# Patient Record
Sex: FEMALE | Race: WHITE | NOT HISPANIC OR LATINO | Employment: OTHER | URBAN - METROPOLITAN AREA
[De-identification: names, ages, dates, MRNs, and addresses within clinical notes are randomized per-mention and may not be internally consistent; named-entity substitution may affect disease eponyms.]

---

## 2017-08-02 LAB
ALBUMIN ?TM MFR UR ELPH: 100 %
ALBUMIN SERPL ELPH-MCNC: 3.8 G/DL (ref 3.8–4.8)
ALBUMIN SERPL-MCNC: 3.9 G/DL (ref 3.6–5.1)
ALBUMIN/GLOB SERPL: 1.3 (CALC) (ref 1–2.5)
ALP SERPL-CCNC: 68 U/L (ref 33–130)
ALPHA1 GLOB ?TM MFR UR ELPH: NORMAL %
ALPHA1 GLOB SERPL ELPH-MCNC: 0.3 G/DL (ref 0.2–0.3)
ALPHA2 GLOB ?TM MFR UR ELPH: NORMAL %
ALPHA2 GLOB SERPL ELPH-MCNC: 0.8 G/DL (ref 0.5–0.9)
ALT SERPL-CCNC: 11 U/L (ref 6–29)
AST SERPL-CCNC: 22 U/L (ref 10–35)
B-GLOBULIN ?TM MFR UR ELPH: NORMAL %
B2 MICROGLOB SERPL-MCNC: 3.75 MG/L
BASOPHILS # BLD AUTO: 59 CELLS/UL (ref 0–200)
BASOPHILS NFR BLD AUTO: 1.1 %
BETA1 GLOB SERPL ELPH-MCNC: 0.4 G/DL (ref 0.4–0.6)
BETA2 GLOB SERPL ELPH-MCNC: 0.3 G/DL (ref 0.2–0.5)
BILIRUB SERPL-MCNC: 0.7 MG/DL (ref 0.2–1.2)
BUN SERPL-MCNC: 25 MG/DL (ref 7–25)
BUN/CREAT SERPL: 22 (CALC) (ref 6–22)
CALCIUM SERPL-MCNC: 10.2 MG/DL (ref 8.6–10.4)
CHLORIDE SERPL-SCNC: 108 MMOL/L (ref 98–110)
CO2 SERPL-SCNC: 25 MMOL/L (ref 20–31)
CREAT 24H UR-MRATE: 0.45 G/24 H (ref 0.63–2.5)
CREAT SERPL-MCNC: 1.13 MG/DL (ref 0.6–0.88)
EOSINOPHIL # BLD AUTO: 400 CELLS/UL (ref 15–500)
EOSINOPHIL NFR BLD AUTO: 7.4 %
ERYTHROCYTE [DISTWIDTH] IN BLOOD BY AUTOMATED COUNT: 13.9 % (ref 11–15)
GAMMA GLOB ?TM MFR UR ELPH: NORMAL %
GAMMA GLOB SERPL ELPH-MCNC: 1.2 G/DL (ref 0.8–1.7)
GFR SERPL CREATININE-BSD FRML MDRD: 43 ML/MIN/1.73M2
GLOBULIN SER CALC-MCNC: 2.9 G/DL (CALC) (ref 1.9–3.7)
GLUCOSE SERPL-MCNC: 98 MG/DL (ref 65–99)
HCT VFR BLD AUTO: 40.2 % (ref 35–45)
HGB BLD-MCNC: 13 G/DL (ref 11.7–15.5)
IGA SERPL-MCNC: 142 MG/DL (ref 81–463)
IGG SERPL-MCNC: 1320 MG/DL (ref 694–1618)
IGM SERPL-MCNC: 62 MG/DL (ref 48–271)
INTERPRETATION: NORMAL
KAPPA LC FREE SER-MCNC: 28.4 MG/L (ref 3.3–19.4)
KAPPA LC FREE/LAMBDA FREE SER: 1.49 {RATIO} (ref 0.26–1.65)
LAMBDA LC FREE SERPL-MCNC: 19 MG/L (ref 5.7–26.3)
LYMPHOCYTES # BLD AUTO: 1145 CELLS/UL (ref 850–3900)
LYMPHOCYTES NFR BLD AUTO: 21.2 %
M PROTEIN 1 SERPL ELPH-MCNC: 0.8 G/DL
MCH RBC QN AUTO: 30.1 PG (ref 27–33)
MCHC RBC AUTO-ENTMCNC: 32.3 G/DL (ref 32–36)
MCV RBC AUTO: 93.1 FL (ref 80–100)
MONOCYTES # BLD AUTO: 491 CELLS/UL (ref 200–950)
MONOCYTES NFR BLD AUTO: 9.1 %
NEUTROPHILS # BLD AUTO: 3305 CELLS/UL (ref 1500–7800)
NEUTROPHILS NFR BLD AUTO: 61.2 %
PLATELET # BLD AUTO: 215 THOUSAND/UL (ref 140–400)
PMV BLD REES-ECKER: 11.7 FL (ref 7.5–12.5)
POTASSIUM SERPL-SCNC: 4.4 MMOL/L (ref 3.5–5.3)
PROT 24H UR-MRATE: 44 MG/24 H
PROT PATTERN SERPL ELPH-IMP: ABNORMAL
PROT SERPL-MCNC: 6.8 G/DL (ref 6.1–8.1)
PROT SERPL-MCNC: 6.9 G/DL (ref 6.1–8.1)
PROT/CREAT 24H UR: 97 MG/G CREAT
RBC # BLD AUTO: 4.32 MILLION/UL (ref 3.8–5.1)
SODIUM SERPL-SCNC: 144 MMOL/L (ref 135–146)
WBC # BLD AUTO: 5.4 THOUSAND/UL (ref 3.8–10.8)

## 2017-08-10 ENCOUNTER — OFFICE VISIT (OUTPATIENT)
Dept: HEMATOLOGY/ONCOLOGY | Facility: CLINIC | Age: 82
End: 2017-08-10
Payer: MEDICARE

## 2017-08-10 VITALS
TEMPERATURE: 98 F | SYSTOLIC BLOOD PRESSURE: 159 MMHG | DIASTOLIC BLOOD PRESSURE: 79 MMHG | HEART RATE: 71 BPM | WEIGHT: 132 LBS | RESPIRATION RATE: 18 BRPM

## 2017-08-10 DIAGNOSIS — D47.2 MONOCLONAL PARAPROTEINEMIA: ICD-10-CM

## 2017-08-10 DIAGNOSIS — N18.9 ANEMIA IN CHRONIC KIDNEY DISEASE(285.21): ICD-10-CM

## 2017-08-10 DIAGNOSIS — D63.8 ANEMIA OF OTHER CHRONIC DISEASE: ICD-10-CM

## 2017-08-10 DIAGNOSIS — D63.1 ANEMIA IN CHRONIC KIDNEY DISEASE(285.21): ICD-10-CM

## 2017-08-10 PROCEDURE — 1126F AMNT PAIN NOTED NONE PRSNT: CPT | Mod: ,,, | Performed by: INTERNAL MEDICINE

## 2017-08-10 PROCEDURE — 1159F MED LIST DOCD IN RCRD: CPT | Mod: ,,, | Performed by: INTERNAL MEDICINE

## 2017-08-10 PROCEDURE — 99213 OFFICE O/P EST LOW 20 MIN: CPT | Mod: ,,, | Performed by: INTERNAL MEDICINE

## 2017-08-10 RX ORDER — ASPIRIN 81 MG/1
81 TABLET ORAL DAILY
COMMUNITY
End: 2018-01-23

## 2017-08-10 RX ORDER — TIMOLOL MALEATE 5 MG/ML
1 SOLUTION OPHTHALMIC DAILY
Status: ON HOLD | COMMUNITY
Start: 2017-06-07 | End: 2019-08-22 | Stop reason: HOSPADM

## 2017-08-10 RX ORDER — CALCITRIOL 0.25 UG/1
CAPSULE ORAL
Status: ON HOLD | COMMUNITY
Start: 2017-06-05 | End: 2021-01-01 | Stop reason: HOSPADM

## 2017-08-10 RX ORDER — FUROSEMIDE 20 MG/1
20 TABLET ORAL DAILY
COMMUNITY
Start: 2017-06-07 | End: 2020-01-01

## 2017-08-10 RX ORDER — DILTIAZEM HYDROCHLORIDE 240 MG/1
CAPSULE, EXTENDED RELEASE ORAL
COMMUNITY
Start: 2017-06-09 | End: 2018-08-02 | Stop reason: ALTCHOICE

## 2017-08-10 RX ORDER — TROSPIUM CHLORIDE 20 MG/1
20 TABLET, FILM COATED ORAL NIGHTLY
COMMUNITY
Start: 2017-06-11 | End: 2019-09-16 | Stop reason: SDUPTHER

## 2017-08-10 RX ORDER — LOVASTATIN 20 MG/1
20 TABLET ORAL NIGHTLY
COMMUNITY
Start: 2017-06-11 | End: 2019-09-16 | Stop reason: SDUPTHER

## 2017-08-10 NOTE — PROGRESS NOTES
[facility] Hematology/Oncology  PROGRESS NOTE      Subjective:       Patient ID:   NAME: Maru Mansfield : 1927     89 y.o. female    Referring Doc: Curtis  Other Physicians: Eliseo quinones    Chief Complaint:  MGUS f/u    History of Present Illness:     Patient returns today for a regularly scheduled follow-up visit.  The patient is here with her . She completed the 3 month regimen of oral chemotherapy for the basal cell carcinoma per Dr Rodrigez's direction. She has been having some memory issues according to her son and they have a follow-up with Dr magana; no CP, SOB, HA's or N/V            ROS:   GEN: normal without any fever, night sweats or weight loss  HEENT: normal with no HA's, sore throat, stiff neck, changes in vision  CV: normal with no CP, SOB, PND, CREWS or orthopnea  PULM: normal with no SOB, cough, hemoptysis, sputum or pleuritic pain  GI: normal with no abdominal pain, nausea, vomiting, constipation, diarrhea, melanotic stools, BRBPR, or hematemesis  : normal with no hematuria, dysuria  BREAST: normal with no mass, discharge, pain  SKIN: normal with no rash, erythema, bruising, or swelling    Allergies:  Review of patient's allergies indicates:  Allergies not on file    Medications:    Current Outpatient Prescriptions:     aspirin (ECOTRIN) 81 MG EC tablet, Take 81 mg by mouth once daily., Disp: , Rfl:     calcitRIOL (ROCALTROL) 0.25 MCG Cap, , Disp: , Rfl:     diltiaZEM (DILACOR XR) 240 MG CDCR, , Disp: , Rfl:     furosemide (LASIX) 20 MG tablet, , Disp: , Rfl:     lovastatin (MEVACOR) 20 MG tablet, , Disp: , Rfl:     timolol maleate 0.5% (TIMOPTIC-XE) 0.5 % SolG, , Disp: , Rfl:     trospium (SANCTURA) 20 mg Tab tablet, , Disp: , Rfl:     PMHx/PSHx Updates:  See patient's last visit with me on 17.  See H&P on 3/17/2010        Pathology:  No matching staging information was found for the patient.          Objective:     Vitals:  Blood pressure (!) 159/79, pulse 71, temperature  97.8 °F (36.6 °C), temperature source Oral, resp. rate 18, weight 59.9 kg (132 lb).    Physical Examination:   GEN: no apparent distress, comfortable; AAOx3  HEAD: atraumatic and normocephalic  EYES: no pallor, no icterus, PERRLA  ENT: OMM, no pharyngeal erythema, external ears WNL; no nasal discharge; no thrush  NECK: no masses, thyroid normal, trachea midline, no LAD/LN's, supple  CV: RRR with no murmur; normal pulse; normal S1 and S2; no pedal edema  CHEST: Normal respiratory effort; CTAB; normal breath sounds; no wheeze or crackles  ABDOM: nontender and nondistended; soft; normal bowel sounds; no rebound/guarding  MUSC/Skeletal: ROM normal; no crepitus;chronic arthritic changes  EXTREM: no clubbing, cyanosis, inflammation or swelling  SKIN: no rashes, lesions, ulcers, petechiae or subcutaneous nodules  : no bryan  NEURO: grossly intact; motor/sensory WNL; AAOx3; no tremors  PSYCH: normal mood, affect and behavior  LYMPH: normal cervical, supraclavicular, axillary and groin LN's            Labs:     7/31/2017      Radiology/Diagnostic Studies:    No results found.    I have reviewed all available lab results and radiology reports.    Assessment/Plan:   (1) 89 y.o. female with diagnosis of MGUS  - latest kappa/lanm ratio normal at 1.49  - Ig panel wnl  - beta2 microglob at 3.75  - SPEP with 0.8 band and stable      (2) chronic anemia with multifactorial etiology including anemia of chronic disorders and anemia of chronic renal disease  - latest hgb wnl at 11.7     (3) HTN - followed by Dr Acevedo; BP still a little high    (4) CRI - followed by Dr Barron with neph    (5) basal cell skin ca - followed by Dr Rodrigez with derm; she completed 3 month regimen of oral therapy per Dr Rodrigez's direction            PLAN:  1. Repeat protein labs in 6 months  2. F/u with PCP, Neph and Derm  3. RTC in 6 months  Fax note to Jose Marino, Kannan Acevedo MD, and Elia    I spent over 15 mins of time with the patient. Over half  of this time was spent couseling and coordinating care: reviewing materials, labs, reports and studies; making treatment and analytical decisions; ordering necessary labs, tests and studies; and discussing treatment options and setting up treatment plans.      Discussion:     I have explained all of the above in detail and the patient understands all of the current recommendation(s). I have answered all of their questions to the best of my ability and to their complete satisfaction.   The patient is to continue with the current management plan.            Electronically signed by Tomas Fregoso MD

## 2018-01-23 ENCOUNTER — OFFICE VISIT (OUTPATIENT)
Dept: HEMATOLOGY/ONCOLOGY | Facility: CLINIC | Age: 83
End: 2018-01-23
Payer: MEDICARE

## 2018-01-23 VITALS
WEIGHT: 143.63 LBS | SYSTOLIC BLOOD PRESSURE: 134 MMHG | HEART RATE: 54 BPM | DIASTOLIC BLOOD PRESSURE: 61 MMHG | RESPIRATION RATE: 18 BRPM | TEMPERATURE: 98 F

## 2018-01-23 DIAGNOSIS — D63.8 ANEMIA, CHRONIC DISEASE: ICD-10-CM

## 2018-01-23 DIAGNOSIS — D63.1 ANEMIA IN CHRONIC KIDNEY DISEASE, UNSPECIFIED CKD STAGE: ICD-10-CM

## 2018-01-23 DIAGNOSIS — N18.9 ANEMIA IN CHRONIC KIDNEY DISEASE, UNSPECIFIED CKD STAGE: ICD-10-CM

## 2018-01-23 DIAGNOSIS — D47.2 MONOCLONAL PARAPROTEINEMIA: Primary | ICD-10-CM

## 2018-01-23 PROCEDURE — 99213 OFFICE O/P EST LOW 20 MIN: CPT | Mod: ,,, | Performed by: INTERNAL MEDICINE

## 2018-01-23 RX ORDER — AMLODIPINE BESYLATE 10 MG/1
TABLET ORAL
COMMUNITY
Start: 2017-11-28 | End: 2018-08-02 | Stop reason: ALTCHOICE

## 2018-01-23 RX ORDER — AMLODIPINE BESYLATE 5 MG/1
TABLET ORAL
COMMUNITY
Start: 2018-01-08 | End: 2018-08-02 | Stop reason: ALTCHOICE

## 2018-01-23 RX ORDER — ACETAMINOPHEN 500 MG
TABLET ORAL
COMMUNITY

## 2018-01-23 RX ORDER — MAGNESIUM 30 MG
TABLET ORAL ONCE
Status: ON HOLD | COMMUNITY
End: 2019-08-20

## 2018-01-23 RX ORDER — UBIDECARENONE 75 MG
500 CAPSULE ORAL DAILY
COMMUNITY
End: 2020-01-01

## 2018-01-23 RX ORDER — IBUPROFEN 800 MG/1
TABLET ORAL
COMMUNITY
Start: 2017-11-15 | End: 2020-01-01

## 2018-01-23 NOTE — PROGRESS NOTES
[facility] Hematology/Oncology  PROGRESS NOTE      Subjective:       Patient ID:   NAME: Maru Mansfield : 1927     89 y.o. female    Referring Doc: Curtis  Other Physicians: Eliseo Barron Barrios    Chief Complaint:  MGUS f/u    History of Present Illness:     Patient returns today for a regularly scheduled follow-up visit.  The patient is here with her son. She has been having some memory issues according to her son and they have a follow-up with Dr Acevedo today; no CP, SOB, HA's or N/V; she has some decrease in appetite; occasional chronic cough            ROS:   GEN: normal without any fever, night sweats or weight loss; decrease appetite  HEENT: normal with no HA's, sore throat, stiff neck, changes in vision  CV: normal with no CP, SOB, PND, CREWS or orthopnea  PULM: normal with no SOB, hemoptysis, sputum or pleuritic pain; occ chronic cough  GI: normal with no abdominal pain, nausea, vomiting, constipation, diarrhea, melanotic stools, BRBPR, or hematemesis  : normal with no hematuria, dysuria  BREAST: normal with no mass, discharge, pain  SKIN: normal with no rash, erythema, bruising, or swelling    Allergies:  Review of patient's allergies indicates:  Allergies not on file    Medications:    Current Outpatient Prescriptions:     cyanocobalamin 500 MCG tablet, Take 500 mcg by mouth once daily., Disp: , Rfl:     magnesium 30 mg Tab, Take by mouth once., Disp: , Rfl:     melatonin 5 mg Tab, Take by mouth., Disp: , Rfl:     amLODIPine (NORVASC) 10 MG tablet, , Disp: , Rfl:     amLODIPine (NORVASC) 5 MG tablet, , Disp: , Rfl:     calcitRIOL (ROCALTROL) 0.25 MCG Cap, , Disp: , Rfl:     diltiaZEM (DILACOR XR) 240 MG CDCR, , Disp: , Rfl:     furosemide (LASIX) 20 MG tablet, , Disp: , Rfl:     ibuprofen (ADVIL,MOTRIN) 800 MG tablet, , Disp: , Rfl:     lovastatin (MEVACOR) 20 MG tablet, , Disp: , Rfl:     timolol maleate 0.5% (TIMOPTIC-XE) 0.5 % SolG, , Disp: , Rfl:     trospium (SANCTURA) 20 mg Tab  tablet, , Disp: , Rfl:     PMHx/PSHx Updates:  See patient's last visit with me on 8/10/17.  See H&P on 3/17/2010        Pathology:  No matching staging information was found for the patient.          Objective:     Vitals:  Blood pressure 134/61, pulse (!) 54, temperature 98.4 °F (36.9 °C), resp. rate 18, weight 65.1 kg (143 lb 9.6 oz).    Physical Examination:   GEN: no apparent distress, comfortable; AAOx3  HEAD: atraumatic and normocephalic  EYES: no pallor, no icterus, PERRLA  ENT: OMM, no pharyngeal erythema, external ears WNL; no nasal discharge; no thrush  NECK: no masses, thyroid normal, trachea midline, no LAD/LN's, supple  CV: RRR with no murmur; normal pulse; normal S1 and S2; no pedal edema  CHEST: Normal respiratory effort; CTAB; normal breath sounds; no wheeze or crackles, she uses O2 at night  ABDOM: nontender and nondistended; soft; normal bowel sounds; no rebound/guarding  MUSC/Skeletal: ROM normal; no crepitus;chronic arthritic changes, she uses a walker  EXTREM: no clubbing, cyanosis, inflammation or swelling  SKIN: no rashes, lesions, ulcers, petechiae or subcutaneous nodules  : no bryan  NEURO: grossly intact; motor/sensory WNL; AAOx3; no tremors  PSYCH: normal mood, affect and behavior  LYMPH: normal cervical, supraclavicular, axillary and groin LN's            Labs:     No recent labs      Radiology/Diagnostic Studies:    No results found.    I have reviewed all available lab results and radiology reports.    Assessment/Plan:   (1) 89 y.o. female with diagnosis of MGUS  - she has not had any recent labs or urine studies since July 2017  - will re-order studies      (2) chronic anemia with multifactorial etiology including anemia of chronic disorders and anemia of chronic renal disease  - latest hgb wnl at 11.7 in July 2017     (3) HTN - followed by Dr Acevedo; BP still a little high    (4) CRI - followed by Dr Barron with neph    (5) basal cell skin ca - followed by Dr Rodrigez with derm; she  completed 3 month regimen of oral therapy per Dr Rodrigez's direction    (6) Atrial fibrillation - followed by Dr Javier with cardiology; hospitalized in Nov 2017    (7) Noncompliance with labs studies        PLAN:  1. Reorder protein studies and basic labs - repeat protein studies every 6 months  2. F/u with PCP, Neph and Derm  3. RTC in 6 months  4. Encouraged compliance  Fax note to Jose Marino, Kannan Acevedo MD, and Elia    I spent over 15 mins of time with the patient. Over half of this time was spent couseling and coordinating care: reviewing materials, labs, reports and studies; making treatment and analytical decisions; ordering necessary labs, tests and studies; and discussing treatment options and setting up treatment plans.      Discussion:     I have explained all of the above in detail and the patient understands all of the current recommendation(s). I have answered all of their questions to the best of my ability and to their complete satisfaction.   The patient is to continue with the current management plan.            Electronically signed by Tomas Fregoso MD

## 2018-01-23 NOTE — LETTER
January 23, 2018      Kannan Acevedo MD  [address]  [address]  [place] [address]           [facility] Hematology Oncology  [address]  [address]  [place] [address]  Phone: [phone]  Fax: [phone]          Patient: Maru Mansfield   MR Number: [id number]   YOB: 1927   Date of Visit: 1/23/2018       Dear Dr. Kannan Acevedo:    Thank you for referring Maru Mansfield to me for evaluation. Attached you will find relevant portions of my assessment and plan of care.    If you have questions, please do not hesitate to call me. I look forward to following Maru Mansfield along with you.    Sincerely,    Tomas Fregoso MD    Enclosure  CC:  No Recipients    If you would like to receive this communication electronically, please contact [email or web address] or [phone] to request more information on [employer] Link access.    For providers and/or their staff who would like to refer a patient to [facility], please contact us through our one-stop-shop provider referral line, [facility], at [phone].    If you feel you have received this communication in error or would no longer like to receive these types of communications, please e-mail [email or web address]

## 2018-01-24 ENCOUNTER — TELEPHONE (OUTPATIENT)
Dept: HEMATOLOGY/ONCOLOGY | Facility: CLINIC | Age: 83
End: 2018-01-24

## 2018-01-24 NOTE — TELEPHONE ENCOUNTER
----- Message from Selin Mustafa sent at 1/24/2018  9:09 AM CST -----  Avelino patient's son called in and stated that on patient's AVS it says to stop the aspirin. The patient would like to know why. Please advise and contact Avelino at [phone]. Thanks

## 2018-01-24 NOTE — TELEPHONE ENCOUNTER
Called pt son back to let him know him know mother should keep taking aspirin and not taking both norvasc both pills

## 2018-01-31 LAB
ALBUMIN ?TM MFR UR ELPH: 52 %
ALBUMIN SERPL ELPH-MCNC: 3.8 G/DL (ref 3.8–4.8)
ALBUMIN SERPL-MCNC: 3.7 G/DL (ref 3.6–5.1)
ALBUMIN/GLOB SERPL: 1.4 (CALC) (ref 1–2.5)
ALP SERPL-CCNC: 73 U/L (ref 33–130)
ALPHA1 GLOB ?TM MFR UR ELPH: 6 %
ALPHA1 GLOB SERPL ELPH-MCNC: 0.4 G/DL (ref 0.2–0.3)
ALPHA2 GLOB ?TM MFR UR ELPH: 10 %
ALPHA2 GLOB SERPL ELPH-MCNC: 0.8 G/DL (ref 0.5–0.9)
ALT SERPL-CCNC: 53 U/L (ref 6–29)
AST SERPL-CCNC: 58 U/L (ref 10–35)
B-GLOBULIN ?TM MFR UR ELPH: 17 %
B2 MICROGLOB SERPL-MCNC: 4.46 MG/L
BASOPHILS # BLD AUTO: 78 CELLS/UL (ref 0–200)
BASOPHILS NFR BLD AUTO: 1.3 %
BETA1 GLOB SERPL ELPH-MCNC: 0.5 G/DL (ref 0.4–0.6)
BETA2 GLOB SERPL ELPH-MCNC: 0.3 G/DL (ref 0.2–0.5)
BILIRUB SERPL-MCNC: 0.9 MG/DL (ref 0.2–1.2)
BUN SERPL-MCNC: 35 MG/DL (ref 7–25)
BUN/CREAT SERPL: 35 (CALC) (ref 6–22)
CALCIUM SERPL-MCNC: 9.7 MG/DL (ref 8.6–10.4)
CHLORIDE SERPL-SCNC: 109 MMOL/L (ref 98–110)
CO2 SERPL-SCNC: 29 MMOL/L (ref 20–31)
CREAT 24H UR-MRATE: 0.53 G/24 H (ref 0.63–2.5)
CREAT SERPL-MCNC: 0.99 MG/DL (ref 0.6–0.88)
EOSINOPHIL # BLD AUTO: 204 CELLS/UL (ref 15–500)
EOSINOPHIL NFR BLD AUTO: 3.4 %
ERYTHROCYTE [DISTWIDTH] IN BLOOD BY AUTOMATED COUNT: 13.4 % (ref 11–15)
GAMMA GLOB ?TM MFR UR ELPH: 15 %
GAMMA GLOB SERPL ELPH-MCNC: 1.1 G/DL (ref 0.8–1.7)
GFR SERPL CREATININE-BSD FRML MDRD: 50 ML/MIN/1.73M2
GLOBULIN SER CALC-MCNC: 2.6 G/DL (CALC) (ref 1.9–3.7)
GLUCOSE SERPL-MCNC: 83 MG/DL (ref 65–99)
HCT VFR BLD AUTO: 36.6 % (ref 35–45)
HGB BLD-MCNC: 12.2 G/DL (ref 11.7–15.5)
IGA SERPL-MCNC: 126 MG/DL (ref 81–463)
IGG SERPL-MCNC: 1167 MG/DL (ref 694–1618)
IGM SERPL-MCNC: 61 MG/DL (ref 48–271)
INTERPRETATION SERPL IFE-IMP: NORMAL
INTERPRETATION UR IFE-IMP: NORMAL
KAPPA LC FREE SER-MCNC: 27.3 MG/L (ref 3.3–19.4)
KAPPA LC FREE/LAMBDA FREE SER: 1.39 {RATIO} (ref 0.26–1.65)
LAMBDA LC FREE SERPL-MCNC: 19.7 MG/L (ref 5.7–26.3)
LYMPHOCYTES # BLD AUTO: 1122 CELLS/UL (ref 850–3900)
LYMPHOCYTES NFR BLD AUTO: 18.7 %
M PROTEIN 1 SERPL ELPH-MCNC: 0.7 G/DL
M PROTEIN 24H UR ELPH-MRATE: 4 MG/24 H
MCH RBC QN AUTO: 30.5 PG (ref 27–33)
MCHC RBC AUTO-ENTMCNC: 33.3 G/DL (ref 32–36)
MCV RBC AUTO: 91.5 FL (ref 80–100)
MONOCYTES # BLD AUTO: 594 CELLS/UL (ref 200–950)
MONOCYTES NFR BLD AUTO: 9.9 %
NEUTROPHILS # BLD AUTO: 4002 CELLS/UL (ref 1500–7800)
NEUTROPHILS NFR BLD AUTO: 66.7 %
PLATELET # BLD AUTO: 245 THOUSAND/UL (ref 140–400)
PMV BLD REES-ECKER: 10.4 FL (ref 7.5–12.5)
POTASSIUM SERPL-SCNC: 4.3 MMOL/L (ref 3.5–5.3)
PROT 24H UR-MRATE: 100 MG/24 H
PROT PATTERN SERPL ELPH-IMP: ABNORMAL
PROT PATTERN UR ELPH-IMP: ABNORMAL
PROT SERPL-MCNC: 6.3 G/DL (ref 6.1–8.1)
PROT SERPL-MCNC: 6.8 G/DL (ref 6.1–8.1)
PROT/CREAT 24H UR: 189 MG/G CREAT
RBC # BLD AUTO: 4 MILLION/UL (ref 3.8–5.1)
SODIUM SERPL-SCNC: 142 MMOL/L (ref 135–146)
WBC # BLD AUTO: 6 THOUSAND/UL (ref 3.8–10.8)

## 2018-06-18 ENCOUNTER — TELEPHONE (OUTPATIENT)
Dept: HEMATOLOGY/ONCOLOGY | Facility: CLINIC | Age: 83
End: 2018-06-18

## 2018-06-18 NOTE — TELEPHONE ENCOUNTER
----- Message from Cathy Ogden sent at 6/18/2018  2:54 PM CDT -----  Contact: James, son of patient  James, son of patient called.  He wanted to know if Dr. DOWD requested to take his mother off of medication Sanctura.  She hasn't taken it since January 2018.  I didn't find any notes to say Dr. DOWD requested her to stop taking.  He can find no other record of any of her other dr's asking her to stop taking.  I did find the encounter of the aspirin and the norvasc.    He now needs to know what to do because she hasn't taken it since January.    # [phone]    Thanks,  Cathy    I returned call and spoke to son, Avelino. He explained to me that she has not been taking her Sanctura for about 6 months and he noticed it yesterday when she mentioned that she had having frequent trips to the bathroom to urinate. He believes one of her 4 doctors told her to stop it but he can find no record of this in his paperwork. He takes care of her medication on a  daily basis. He said he has called latisha Acevedo, Yaya and Ned. They have all said they did not stop the medication. I also told him that Dr. Fregoso did not stop it. He said he was speaking with the nurse practitioner at Dr. Acevedo's office about resuming it as that doctor's office started the medication for her. Her follow up here is in July. I reminded him of this today.

## 2018-07-23 ENCOUNTER — OFFICE VISIT (OUTPATIENT)
Dept: HEMATOLOGY/ONCOLOGY | Facility: CLINIC | Age: 83
End: 2018-07-23
Payer: MEDICARE

## 2018-07-23 VITALS
BODY MASS INDEX: 23.81 KG/M2 | TEMPERATURE: 98 F | HEART RATE: 60 BPM | RESPIRATION RATE: 18 BRPM | SYSTOLIC BLOOD PRESSURE: 129 MMHG | HEIGHT: 66 IN | WEIGHT: 148.13 LBS | DIASTOLIC BLOOD PRESSURE: 77 MMHG

## 2018-07-23 DIAGNOSIS — D63.1 ANEMIA IN CHRONIC KIDNEY DISEASE, UNSPECIFIED CKD STAGE: ICD-10-CM

## 2018-07-23 DIAGNOSIS — D47.2 MONOCLONAL PARAPROTEINEMIA: ICD-10-CM

## 2018-07-23 DIAGNOSIS — N18.9 ANEMIA IN CHRONIC KIDNEY DISEASE, UNSPECIFIED CKD STAGE: ICD-10-CM

## 2018-07-23 DIAGNOSIS — D63.8 ANEMIA, CHRONIC DISEASE: Primary | ICD-10-CM

## 2018-07-23 PROCEDURE — 99213 OFFICE O/P EST LOW 20 MIN: CPT | Mod: ,,, | Performed by: INTERNAL MEDICINE

## 2018-07-23 RX ORDER — FLUTICASONE PROPIONATE 50 MCG
SPRAY, SUSPENSION (ML) NASAL
COMMUNITY
Start: 2018-07-15 | End: 2020-01-01

## 2018-07-23 RX ORDER — LATANOPROST 50 UG/ML
1 SOLUTION/ DROPS OPHTHALMIC NIGHTLY
COMMUNITY
Start: 2018-06-16

## 2018-07-23 RX ORDER — GABAPENTIN 100 MG/1
100 CAPSULE ORAL NIGHTLY
COMMUNITY
Start: 2018-07-02 | End: 2020-02-19 | Stop reason: SDUPTHER

## 2018-07-23 NOTE — LETTER
July 23, 2018      Kannan Acevedo MD  [address]  [address]  [place] [facility]  [place] [address]           [facility] - Hematology Oncology  [address]  [address]  [place] [address]  Phone: [phone]  Fax: [phone]          Patient: Maru Mansfield   MR Number: [id number]   YOB: 1927   Date of Visit: 7/23/2018       Dear Dr. Kannan Acevedo:    Thank you for referring Maru Mansfield to me for evaluation. Attached you will find relevant portions of my assessment and plan of care.    If you have questions, please do not hesitate to call me. I look forward to following Maru Mansfield along with you.    Sincerely,    Tomas Fregoso MD    Enclosure  CC:  No Recipients    If you would like to receive this communication electronically, please contact [email or web address] or [phone] to request more information on [employer] Link access.    For providers and/or their staff who would like to refer a patient to [facility], please contact us through our one-stop-shop provider referral line, [facility], at [phone].    If you feel you have received this communication in error or would no longer like to receive these types of communications, please e-mail [email or web address]

## 2018-07-23 NOTE — PROGRESS NOTES
[facility] Hematology/Oncology  PROGRESS NOTE      Subjective:       Patient ID:   NAME: Maru Mansfield : 1927     89 y.o. female    Referring Doc: Curtis  Other Physicians: Eliseo Barron Barrios    Chief Complaint:  MGUS f/u    History of Present Illness:     Patient returns today for a regularly scheduled follow-up visit.  The patient is here with her son. She has been having some memory issues according to her son and they have a follow-up with Dr Acevedo today; no CP, SOB, HA's or N/V; she has some decrease in appetite but up and down; occasional chronic cough but better. She had some labs done with Dr Barron couple weeks ago but did not do the protein studies.             ROS:   GEN: normal without any fever, night sweats or weight loss; decrease appetite  HEENT: normal with no HA's, sore throat, stiff neck, changes in vision  CV: normal with no CP, SOB, PND, CREWS or orthopnea  PULM: normal with no SOB, hemoptysis, sputum or pleuritic pain; occ chronic cough but improved  GI: normal with no abdominal pain, nausea, vomiting, constipation, diarrhea, melanotic stools, BRBPR, or hematemesis  : normal with no hematuria, dysuria  BREAST: normal with no mass, discharge, pain  SKIN: normal with no rash, erythema, bruising, or swelling    Allergies:  Review of patient's allergies indicates:  Allergies not on file    Medications:    Current Outpatient Prescriptions:     amLODIPine (NORVASC) 10 MG tablet, , Disp: , Rfl:     fluticasone (FLONASE) 50 mcg/actuation nasal spray, , Disp: , Rfl:     furosemide (LASIX) 20 MG tablet, , Disp: , Rfl:     gabapentin (NEURONTIN) 100 MG capsule, , Disp: , Rfl:     ibuprofen (ADVIL,MOTRIN) 800 MG tablet, , Disp: , Rfl:     latanoprost 0.005 % ophthalmic solution, , Disp: , Rfl:     lovastatin (MEVACOR) 20 MG tablet, , Disp: , Rfl:     timolol maleate 0.5% (TIMOPTIC-XE) 0.5 % SolG, , Disp: , Rfl:     trospium (SANCTURA) 20 mg Tab tablet, , Disp: , Rfl:     amLODIPine  "(NORVASC) 5 MG tablet, , Disp: , Rfl:     calcitRIOL (ROCALTROL) 0.25 MCG Cap, , Disp: , Rfl:     cyanocobalamin 500 MCG tablet, Take 500 mcg by mouth once daily., Disp: , Rfl:     diltiaZEM (DILACOR XR) 240 MG CDCR, , Disp: , Rfl:     magnesium 30 mg Tab, Take by mouth once., Disp: , Rfl:     melatonin 5 mg Tab, Take by mouth., Disp: , Rfl:     PMHx/PSHx Updates:  See patient's last visit with me on 1/23/2018  See H&P on 3/17/2010        Pathology:  Cancer Staging  No matching staging information was found for the patient.          Objective:     Vitals:  Blood pressure 129/77, pulse 60, temperature 97.7 °F (36.5 °C), resp. rate 18, height 5' 6" (1.676 m), weight 67.2 kg (148 lb 1.6 oz).    Physical Examination:   GEN: no apparent distress, comfortable; AAOx3  HEAD: atraumatic and normocephalic  EYES: no pallor, no icterus, PERRLA  ENT: OMM, no pharyngeal erythema, external ears WNL; no nasal discharge; no thrush  NECK: no masses, thyroid normal, trachea midline, no LAD/LN's, supple  CV: RRR with no murmur; normal pulse; normal S1 and S2; no pedal edema  CHEST: Normal respiratory effort; CTAB; normal breath sounds; no wheeze or crackles, she uses O2 at night  ABDOM: nontender and nondistended; soft; normal bowel sounds; no rebound/guarding  MUSC/Skeletal: ROM normal; no crepitus;chronic arthritic changes, she uses a walker  EXTREM: no clubbing, cyanosis, inflammation or swelling  SKIN: no rashes, lesions, ulcers, petechiae or subcutaneous nodules  : no bryan  NEURO: grossly intact; motor/sensory WNL; AAOx3; no tremors  PSYCH: normal mood, affect and behavior  LYMPH: normal cervical, supraclavicular, axillary and groin LN's            Labs:       7/9/2018 CBC and CMP on chart from [facility]      1/25/2018  Lab Results   Component Value Date    WBC 6.0 01/25/2018    HGB 12.2 01/25/2018    HCT 36.6 01/25/2018    MCV 91.5 01/25/2018     01/25/2018     BMP  Lab Results   Component Value Date     " 01/25/2018    K 4.3 01/25/2018     01/25/2018    CO2 29 01/25/2018    BUN 35 (H) 01/25/2018    CREATININE 0.99 (H) 01/25/2018    CALCIUM 9.7 01/25/2018    ESTGFRAFRICA 58 (L) 01/25/2018    EGFRNONAA 50 (L) 01/25/2018     Lab Results   Component Value Date    ALT 53 (H) 01/25/2018    AST 58 (H) 01/25/2018    ALKPHOS 73 01/25/2018    BILITOT 0.9 01/25/2018     Immunoglobulins (IgG, IgA, IgM) Quantitative   Order: [id number]   Status:  Final result   Visible to patient:  No (Not Released) Next appt:  08/02/2018 at 02:00 PM in Cardiology (Wellington Grossman MD) Dx:  Monoclonal paraproteinemia; Anemia, c...     Ref Range & Units 5mo ago 11mo ago    IgA 81 - 463 mg/dL 126  142     IgG, Serum 694 - 1,618 mg/dL 1,167  1,320     IgM 48 - 271 mg/dL 61  62            Immunoglobulin free LT chains blood   Order: [id number]   Status:  Final result   Visible to patient:  No (Not Released) Next appt:  08/02/2018 at 02:00 PM in Cardiology (Wellington Grossman MD) Dx:  Monoclonal paraproteinemia; Anemia, c...     Ref Range & Units 5mo ago 11mo ago    Kappa Light Chain, Free, Serum 3.3 - 19.4 mg/L 27.3   28.4      Lambda Light Chain, Free, Serum 5.7 - 26.3 mg/L 19.7  19.0     Kappa/Lambda Light Chains Free with Ratio, Serum 0.26 - 1.65 1.39  1.49CM                Radiology/Diagnostic Studies:    No results found.    I have reviewed all available lab results and radiology reports.    Assessment/Plan:   (1) 89 y.o. female with diagnosis of MGUS  - she has not had any recent labs or urine studies since Jan 2018  - due for repeat studies this month  - protein band was at 0.7 in Jan 2018  - Ig panel was stable and WNL  - last Beta-2 microglobulin was 4.46  - kappa/lambda ratio was good at 1.39    (2) chronic anemia with multifactorial etiology including anemia of chronic disorders and anemia of chronic renal disease  - latest hgb wnl at  14.3 on 7/9/2018     (3) HTN - followed by Dr Acevedo; BP still a little high    (4) CRI - followed by   Beatrice with neph    (5) basal cell skin ca - followed by Dr Rodrigez with derm; she completed 3 month regimen of oral therapy per Dr Rodrigez's direction    (6) Atrial fibrillation - followed by Dr Javier with cardiology; hospitalized in Nov 2017    (7) Noncompliance with labs studies    1. Anemia, chronic disease     2. Monoclonal paraproteinemia     3. Anemia in chronic kidney disease, unspecified CKD stage           PLAN:  1. Reorder protein studies and basic labs - encouraged repeat protein studies every 6 months  2. F/u with PCP, Neph and Derm  3. RTC in 6 months  4. Encouraged compliance  Fax note to Jose Marino, Kannan Acevedo MD, Yaya and Elia    I spent over 15 mins of time with the patient. Over half of this time was spent couseling and coordinating care: reviewing materials, labs, reports and studies; making treatment and analytical decisions; ordering necessary labs, tests and studies; and discussing treatment options and setting up treatment plans.      Discussion:     I have explained all of the above in detail and the patient understands all of the current recommendation(s). I have answered all of their questions to the best of my ability and to their complete satisfaction.   The patient is to continue with the current management plan.            Electronically signed by Tomas Fregoso MD

## 2018-08-02 ENCOUNTER — OFFICE VISIT (OUTPATIENT)
Dept: CARDIOLOGY | Facility: CLINIC | Age: 83
End: 2018-08-02
Payer: MEDICARE

## 2018-08-02 VITALS
HEART RATE: 57 BPM | DIASTOLIC BLOOD PRESSURE: 67 MMHG | HEIGHT: 66 IN | OXYGEN SATURATION: 94 % | BODY MASS INDEX: 23.38 KG/M2 | WEIGHT: 145.5 LBS | SYSTOLIC BLOOD PRESSURE: 140 MMHG

## 2018-08-02 DIAGNOSIS — Z76.89 ENCOUNTER TO ESTABLISH CARE: ICD-10-CM

## 2018-08-02 DIAGNOSIS — R00.1 BRADYCARDIA: Primary | ICD-10-CM

## 2018-08-02 LAB
ALBUMIN ?TM MFR UR ELPH: 100 %
ALBUMIN SERPL ELPH-MCNC: 3.9 G/DL (ref 3.8–4.8)
ALBUMIN SERPL-MCNC: 4.1 G/DL (ref 3.6–5.1)
ALBUMIN/GLOB SERPL: 1.4 (CALC) (ref 1–2.5)
ALP SERPL-CCNC: 117 U/L (ref 33–130)
ALPHA1 GLOB ?TM MFR UR ELPH: NORMAL %
ALPHA1 GLOB SERPL ELPH-MCNC: 0.3 G/DL (ref 0.2–0.3)
ALPHA2 GLOB ?TM MFR UR ELPH: NORMAL %
ALPHA2 GLOB SERPL ELPH-MCNC: 0.8 G/DL (ref 0.5–0.9)
ALT SERPL-CCNC: 19 U/L (ref 6–29)
AST SERPL-CCNC: 29 U/L (ref 10–35)
B-GLOBULIN ?TM MFR UR ELPH: NORMAL %
B2 MICROGLOB SERPL-MCNC: 4.47 MG/L
BASOPHILS # BLD AUTO: 69 CELLS/UL (ref 0–200)
BASOPHILS NFR BLD AUTO: 1.3 %
BETA1 GLOB SERPL ELPH-MCNC: 0.5 G/DL (ref 0.4–0.6)
BETA2 GLOB SERPL ELPH-MCNC: 0.3 G/DL (ref 0.2–0.5)
BILIRUB SERPL-MCNC: 0.8 MG/DL (ref 0.2–1.2)
BUN SERPL-MCNC: 50 MG/DL (ref 7–25)
BUN/CREAT SERPL: 40 (CALC) (ref 6–22)
CALCIUM SERPL-MCNC: 9.9 MG/DL (ref 8.6–10.4)
CHLORIDE SERPL-SCNC: 108 MMOL/L (ref 98–110)
CO2 SERPL-SCNC: 25 MMOL/L (ref 20–31)
CREAT SERPL-MCNC: 1.25 MG/DL (ref 0.6–0.88)
CREAT UR-MCNC: 39 MG/DL (ref 20–320)
EOSINOPHIL # BLD AUTO: 313 CELLS/UL (ref 15–500)
EOSINOPHIL NFR BLD AUTO: 5.9 %
ERYTHROCYTE [DISTWIDTH] IN BLOOD BY AUTOMATED COUNT: 14 % (ref 11–15)
GAMMA GLOB ?TM MFR UR ELPH: NORMAL %
GAMMA GLOB SERPL ELPH-MCNC: 1.3 G/DL (ref 0.8–1.7)
GFR SERPL CREATININE-BSD FRML MDRD: 38 ML/MIN/1.73M2
GLOBULIN SER CALC-MCNC: 2.9 G/DL (CALC) (ref 1.9–3.7)
GLUCOSE SERPL-MCNC: 102 MG/DL (ref 65–99)
HCT VFR BLD AUTO: 42.4 % (ref 35–45)
HGB BLD-MCNC: 14 G/DL (ref 11.7–15.5)
IGA SERPL-MCNC: 145 MG/DL (ref 81–463)
IGG SERPL-MCNC: 1409 MG/DL (ref 694–1618)
IGM SERPL-MCNC: 80 MG/DL (ref 48–271)
KAPPA LC FREE SER-MCNC: 28.7 MG/L (ref 3.3–19.4)
KAPPA LC FREE/LAMBDA FREE SER: 1.39 {RATIO} (ref 0.26–1.65)
LAMBDA LC FREE SERPL-MCNC: 20.6 MG/L (ref 5.7–26.3)
LYMPHOCYTES # BLD AUTO: 1415 CELLS/UL (ref 850–3900)
LYMPHOCYTES NFR BLD AUTO: 26.7 %
M PROTEIN 1 SERPL ELPH-MCNC: 0.8 G/DL
MCH RBC QN AUTO: 30.3 PG (ref 27–33)
MCHC RBC AUTO-ENTMCNC: 33 G/DL (ref 32–36)
MCV RBC AUTO: 91.8 FL (ref 80–100)
MONOCYTES # BLD AUTO: 519 CELLS/UL (ref 200–950)
MONOCYTES NFR BLD AUTO: 9.8 %
NEUTROPHILS # BLD AUTO: 2984 CELLS/UL (ref 1500–7800)
NEUTROPHILS NFR BLD AUTO: 56.3 %
PLATELET # BLD AUTO: 208 THOUSAND/UL (ref 140–400)
PMV BLD REES-ECKER: 11.1 FL (ref 7.5–12.5)
POTASSIUM SERPL-SCNC: 3.9 MMOL/L (ref 3.5–5.3)
PROT PATTERN SERPL ELPH-IMP: ABNORMAL
PROT PATTERN UR ELPH-IMP: NORMAL
PROT SERPL-MCNC: 7 G/DL (ref 6.1–8.1)
PROT SERPL-MCNC: 7.1 G/DL (ref 6.1–8.1)
PROT UR-MCNC: 5 MG/DL (ref 5–24)
PROT/CREAT UR: 128 MG/G CREAT (ref 21–161)
RBC # BLD AUTO: 4.62 MILLION/UL (ref 3.8–5.1)
SODIUM SERPL-SCNC: 145 MMOL/L (ref 135–146)
WBC # BLD AUTO: 5.3 THOUSAND/UL (ref 3.8–10.8)

## 2018-08-02 PROCEDURE — 99204 OFFICE O/P NEW MOD 45 MIN: CPT | Mod: S$GLB,,, | Performed by: INTERNAL MEDICINE

## 2018-08-02 PROCEDURE — 99999 PR PBB SHADOW E&M-NEW PATIENT-LVL III: CPT | Mod: PBBFAC,,, | Performed by: INTERNAL MEDICINE

## 2018-08-02 PROCEDURE — 93000 ELECTROCARDIOGRAM COMPLETE: CPT | Mod: S$GLB,,, | Performed by: INTERNAL MEDICINE

## 2018-08-02 RX ORDER — ASPIRIN 81 MG/1
81 TABLET ORAL DAILY
COMMUNITY

## 2018-08-02 NOTE — LETTER
August 2, 2018      Ivett Walters MD  [address]  [place] [address]           [facility] - Cardiology  [address]  [place] [address]  Phone: [phone]          Patient: Maru Mansfield   MR Number: [id number]   YOB: 1927   Date of Visit: 8/2/2018       Dear Dr. Ivett Walters:    Thank you for referring Maru Mansfield to me for evaluation. Attached you will find relevant portions of my assessment and plan of care.    If you have questions, please do not hesitate to call me. I look forward to following Maru Mansfield along with you.    Sincerely,    Wellington Grossman MD    Enclosure  CC:  No Recipients    If you would like to receive this communication electronically, please contact [email or web address] or [phone] to request more information on [employer] Link access.    For providers and/or their staff who would like to refer a patient to [facility], please contact us through our one-stop-shop provider referral line, [facility] , at [phone].    If you feel you have received this communication in error or would no longer like to receive these types of communications, please e-mail [email or web address]

## 2018-08-02 NOTE — PROGRESS NOTES
[facility]    CC:  2nd opinion with the she needs a pacemaker  Chief Complaint   Patient presents with    Consult     2nd opinion on needing pacemaker       Patient ID: Maru Mansfield is a 89 y.o. female with a past medical history of sinus bradycardia  HPI  Patient has been seen by another cardiologist in [facility].  It was recommended that she should have a permanent pacemaker. She is here for 2nd opinion.  She denies any history of presyncope or syncope.  Her son had a log of her heart rate and all of them were in the mid 50s.      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     Social History Main Topics    Smoking status: Former Smoker     Quit date: 1970    Smokeless tobacco: Never Used    Alcohol use No    Drug use: No    Sexual activity: Not on file     Other Topics Concern    Not on file     Social History Narrative    No narrative on file     Family History   Problem Relation Age of Onset    Cancer Mother     Heart attack Father     Heart disease Father     Heart disease Maternal Aunt     Cancer Maternal Aunt     Heart attack Paternal Uncle     Cancer Maternal Grandmother        Review of patient's allergies indicates:   Allergen Reactions    Clindamycin     Hydroco     Pcn [penicillins]        Medication List with Changes/Refills   Current Medications    ASPIRIN (ECOTRIN) 81 MG EC TABLET    Take 81 mg by mouth once daily.    CALCITRIOL (ROCALTROL) 0.25 MCG CAP        CYANOCOBALAMIN 500 MCG TABLET    Take 500 mcg by mouth once daily.    FLUTICASONE (FLONASE) 50 MCG/ACTUATION NASAL  "SPRAY        FUROSEMIDE (LASIX) 20 MG TABLET        GABAPENTIN (NEURONTIN) 100 MG CAPSULE        IBUPROFEN (ADVIL,MOTRIN) 800 MG TABLET        LATANOPROST 0.005 % OPHTHALMIC SOLUTION        LOVASTATIN (MEVACOR) 20 MG TABLET        MAGNESIUM 30 MG TAB    Take by mouth once.    MELATONIN 5 MG TAB    Take by mouth.    TIMOLOL MALEATE 0.5% (TIMOPTIC-XE) 0.5 % SOLG        TROSPIUM (SANCTURA) 20 MG TAB TABLET       Discontinued Medications    AMLODIPINE (NORVASC) 10 MG TABLET        AMLODIPINE (NORVASC) 5 MG TABLET        DILTIAZEM (DILACOR XR) 240 MG CDCR           Review of Systems  Constitution: Denies chills, fever, and sweats.  HENT: Denies headaches or blurry vision.  Cardiovascular: Denies chest pain or irregular heart beat.  Respiratory: Denies cough or shortness of breath.  Gastrointestinal: Denies abdominal pain, nausea, or vomiting.  Musculoskeletal: Denies muscle cramps.  Neurological: Denies dizziness or focal weakness.  Psychiatric/Behavioral: Normal mental status.  Hematologic/Lymphatic: Denies bleeding problem or easy bruising/bleeding.  Skin: Denies rash or suspicious lesions    Physical Examination  BP (!) 140/67 (BP Location: Right arm, Patient Position: Sitting)   Pulse (!) 57   Ht 5' 6" (1.676 m)   Wt 66 kg (145 lb 8.1 oz)   SpO2 (!) 94%   BMI 23.48 kg/m²     Constitutional: No acute distress, conversant  HEENT: Sclera anicteric, Pupils equal, round and reactive to light, extraocular motions intact, Oropharynx clear  Neck: No JVD, no carotid bruits  Cardiovascular: regular rate and rhythm, no murmur, rubs or gallops, normal S1/S2  Pulmonary: Clear to auscultation bilaterally  Abdominal: Abdomen soft, nontender, nondistended, positive bowel sounds  Extremities: No lower extremity edema,   Pulses:  Carotid pulses are 2+ on the right side, and 2+ on the left side.  Radial pulses are 2+ on the right side, and 2+ on the left side.   .    Skin: No ecchymosis, erythema, or ulcers  Psych: Alert and " oriented x 3, appropriate affect  Neuro: CNII-XII intact, no focal deficits    Labs:  Most Recent Data  CBC:   Lab Results   Component Value Date    WBC 5.3 07/31/2018    HGB 14.0 07/31/2018    HCT 42.4 07/31/2018     07/31/2018    MCV 91.8 07/31/2018    RDW 14.0 07/31/2018     BMP:   Lab Results   Component Value Date     07/31/2018    K 3.9 07/31/2018     07/31/2018    CO2 25 07/31/2018    BUN 50 (H) 07/31/2018    CREATININE 1.25 (H) 07/31/2018     (H) 07/31/2018    CALCIUM 9.9 07/31/2018     LFTS;   Lab Results   Component Value Date    PROT 7.0 07/31/2018    PROT 7.1 07/31/2018    ALBUMIN 4.1 07/31/2018    BILITOT 0.8 07/31/2018    AST 29 07/31/2018    ALKPHOS 117 07/31/2018    ALT 19 07/31/2018     COAGS: No results found for: INR, PROTIME, PTT  FLP: No results found for: CHOL, HDL, LDLCALC, TRIG, CHOLHDL  CARDIAC: No results found for: TROPONINI, CKMB, BNP    Imaging:    EKG:  Sinus bradycardia.  Left anterior fascicular block.  IVCD    I have personally reviewed these images and echo data    Assessment/Plan:  Maru was seen today for consult.    Diagnoses and all orders for this visit:    Bradycardia  -     Holter monitor - 48 hour; Future         I have requested all her notes from the prior cardiologist.  At this point I do not have any evidence to support that she would need a permanent pacemaker.  I would perform a Holter monitor to further evaluate for any evidence of significant bradyarrhythmia.  If there are no significant Rodrigo arrhythmias we will hold of her permanent pacemaker for the time being        Total duration of face to face visit time 30 minutes.  Total time spent counseling greater than fifty percent of total visit time.  Counseling included discussion regarding imaging findings, diagnosis, possibilities, treatment options, risks and benefits.  The patient had many questions regarding the options and long-term effect which were all answered to my best  ability.      Wellington Grossman MD,MRCP,RPVI,FACC,FSCAI.  Interventional Cardiology   Phone [phone]

## 2018-08-06 DIAGNOSIS — Z76.89 ENCOUNTER TO ESTABLISH CARE: Primary | ICD-10-CM

## 2018-08-14 ENCOUNTER — CLINICAL SUPPORT (OUTPATIENT)
Dept: CARDIOLOGY | Facility: CLINIC | Age: 83
End: 2018-08-14
Attending: INTERNAL MEDICINE
Payer: MEDICARE

## 2018-08-14 DIAGNOSIS — R00.1 BRADYCARDIA: ICD-10-CM

## 2018-08-14 PROCEDURE — 93224 XTRNL ECG REC UP TO 48 HRS: CPT | Mod: ,,, | Performed by: INTERNAL MEDICINE

## 2018-08-20 LAB
OHS CV HOLTER LENGTH DECIMAL HOURS: 47.98
OHS CV HOLTER LENGTH HOURS: 47
OHS CV HOLTER LENGTH MINUTES: 59

## 2018-08-21 ENCOUNTER — TELEPHONE (OUTPATIENT)
Dept: CARDIOLOGY | Facility: CLINIC | Age: 83
End: 2018-08-21

## 2018-08-21 NOTE — TELEPHONE ENCOUNTER
----- Message from RT Michelle sent at 8/21/2018  8:53 AM CDT -----  Contact: Avelino,Son,[phone]   Avelino,Son,[phone], requesting the pt cardiac Holter monitor results, thanks.

## 2018-08-22 ENCOUNTER — TELEPHONE (OUTPATIENT)
Dept: CARDIOLOGY | Facility: CLINIC | Age: 83
End: 2018-08-22

## 2018-08-22 NOTE — TELEPHONE ENCOUNTER
Call placed to Mr. Rooneyel in regards to message left. No answer, unable to leave a message. Will continue to call.

## 2018-08-22 NOTE — TELEPHONE ENCOUNTER
----- Message from Ana Karimi sent at 8/22/2018  9:33 AM CDT -----  Contact: Avelino Mansfield (Son)  Type:  Test Results    Who Called:  Avelino Mansfield (Son)  Name of Test (Lab/Mammo/Etc): 48 Holter monitor  Date of Test:  08/14/18 through 08/16/18  Ordering Provider:  Dr Grossman  Where the test was performed:  At home  Best Call Back Number:  [phone]  Additional Information:  Patient's son left a previous message on 08/21/18 and is waiting for a call back. Please call son. Thanks!

## 2018-08-24 NOTE — TELEPHONE ENCOUNTER
No . Her HR drops low when she sleeps which is physiological. Awake HR are not that low. So no change.   Does she have a f/u appontment?      Spoke with Mr. Mansfield, I advised him per Dr. Grossman that her HR drops low when she is sleeping but it is physiological. When she is awake her HR is not that low. I offered him an appt on 8/28/18 @ 2:30 with Dr. Grossman to discuss in detail with Dr. Grossman the holter results. He accepted. Appt. Patient's son seemed very upset when getting off the phone. No further issues noted.

## 2018-08-28 ENCOUNTER — OFFICE VISIT (OUTPATIENT)
Dept: CARDIOLOGY | Facility: CLINIC | Age: 83
End: 2018-08-28
Payer: MEDICARE

## 2018-08-28 VITALS
WEIGHT: 145.5 LBS | HEIGHT: 66 IN | HEART RATE: 56 BPM | DIASTOLIC BLOOD PRESSURE: 76 MMHG | SYSTOLIC BLOOD PRESSURE: 170 MMHG | BODY MASS INDEX: 23.38 KG/M2 | OXYGEN SATURATION: 93 %

## 2018-08-28 DIAGNOSIS — I10 ESSENTIAL HYPERTENSION: ICD-10-CM

## 2018-08-28 DIAGNOSIS — R00.1 BRADYCARDIA: Primary | ICD-10-CM

## 2018-08-28 PROCEDURE — 99214 OFFICE O/P EST MOD 30 MIN: CPT | Mod: S$GLB,,, | Performed by: INTERNAL MEDICINE

## 2018-08-28 PROCEDURE — 99999 PR PBB SHADOW E&M-EST. PATIENT-LVL IV: CPT | Mod: PBBFAC,,, | Performed by: INTERNAL MEDICINE

## 2018-08-28 NOTE — PROGRESS NOTES
[facility]    CC:   Bradycardia    Patient ID: Maru Mnasfield is a 89 y.o. female with a past medical history of anemia, monoclonal paraproteinemia  HPI  She was here to seek a 2nd opinion regarding the need for permanent pacemaker at this moment.  She denies any symptoms.  She has no dizziness, palpitations, syncope or presyncope.    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     Past Surgical History:   Procedure Laterality Date    GALLBLADDER SURGERY       Social History     Socioeconomic History    Marital status:      Spouse name: Not on file    Number of children: Not on file    Years of education: Not on file    Highest education level: Not on file   Social Needs    Financial resource strain: Not on file    Food insecurity - worry: Not on file    Food insecurity - inability: Not on file    Transportation needs - medical: Not on file    Transportation needs - non-medical: Not on file   Occupational History    Not on file   Tobacco Use    Smoking status: Former Smoker     Last attempt to quit: 1970     Years since quittin.6    Smokeless tobacco: Never Used   Substance and Sexual Activity    Alcohol use: No    Drug use: No    Sexual activity: Not on file   Other Topics Concern    Not on file   Social History Narrative    Not on file     Family History   Problem Relation Age of Onset    Cancer Mother     Heart attack Father     Heart disease Father     Heart disease Maternal Aunt     Cancer Maternal Aunt     Heart attack Paternal Uncle     Cancer Maternal Grandmother        Review of patient's allergies indicates:   Allergen Reactions    Clindamycin     Hydroco     Pcn [penicillins]        Medication List with Changes/Refills   Current Medications    ASPIRIN (ECOTRIN) 81 MG EC TABLET    Take 81 mg by mouth once daily.     "CALCITRIOL (ROCALTROL) 0.25 MCG CAP        CYANOCOBALAMIN 500 MCG TABLET    Take 500 mcg by mouth once daily.    FLUTICASONE (FLONASE) 50 MCG/ACTUATION NASAL SPRAY        FUROSEMIDE (LASIX) 20 MG TABLET        GABAPENTIN (NEURONTIN) 100 MG CAPSULE        IBUPROFEN (ADVIL,MOTRIN) 800 MG TABLET        LATANOPROST 0.005 % OPHTHALMIC SOLUTION        LOVASTATIN (MEVACOR) 20 MG TABLET        MAGNESIUM 30 MG TAB    Take by mouth once.    MELATONIN 5 MG TAB    Take by mouth.    TIMOLOL MALEATE 0.5% (TIMOPTIC-XE) 0.5 % SOLG        TROSPIUM (SANCTURA) 20 MG TAB TABLET           Review of Systems  Constitution: Denies chills, fever, and sweats.  HENT: Denies headaches or blurry vision.  Cardiovascular: Denies chest pain or irregular heart beat.  Respiratory: Denies cough or shortness of breath.  Gastrointestinal: Denies abdominal pain, nausea, or vomiting.  Musculoskeletal: Denies muscle cramps.  Neurological: Denies dizziness or focal weakness.  Psychiatric/Behavioral: Normal mental status.  Hematologic/Lymphatic: Denies bleeding problem or easy bruising/bleeding.  Skin: Denies rash or suspicious lesions    Physical Examination  BP (!) 170/76 (BP Location: Right arm, Patient Position: Sitting)   Pulse (!) 56   Ht 5' 6" (1.676 m)   Wt 66 kg (145 lb 8.1 oz)   SpO2 (!) 93%   BMI 23.48 kg/m²     Constitutional: No acute distress, conversant  HEENT: Sclera anicteric, Pupils equal, round and reactive to light, extraocular motions intact, Oropharynx clear  Neck: No JVD, no carotid bruits  Cardiovascular: regular rate and rhythm, no murmur, rubs or gallops, normal S1/S2  Pulmonary: Clear to auscultation bilaterally  Abdominal: Abdomen soft, nontender, nondistended, positive bowel sounds  Extremities: No lower extremity edema,   Pulses:  Carotid pulses are 2+ on the right side, and 2+ on the left side.  Radial pulses are 2+ on the right side, and 2+ on the left side.      Skin: No ecchymosis, erythema, or ulcers  Psych: Alert and " oriented x 3, appropriate affect  Neuro: CNII-XII intact, no focal deficits    Labs:  Most Recent Data  CBC:   Lab Results   Component Value Date    WBC 5.3 07/31/2018    HGB 14.0 07/31/2018    HCT 42.4 07/31/2018     07/31/2018    MCV 91.8 07/31/2018    RDW 14.0 07/31/2018     BMP:   Lab Results   Component Value Date     07/31/2018    K 3.9 07/31/2018     07/31/2018    CO2 25 07/31/2018    BUN 50 (H) 07/31/2018    CREATININE 1.25 (H) 07/31/2018     (H) 07/31/2018    CALCIUM 9.9 07/31/2018     LFTS;   Lab Results   Component Value Date    PROT 7.0 07/31/2018    PROT 7.1 07/31/2018    ALBUMIN 4.1 07/31/2018    BILITOT 0.8 07/31/2018    AST 29 07/31/2018    ALKPHOS 117 07/31/2018    ALT 19 07/31/2018     COAGS: No results found for: INR, PROTIME, PTT  FLP: No results found for: CHOL, HDL, LDLCALC, TRIG, CHOLHDL  CARDIAC: No results found for: TROPONINI, CKMB, BNP    Imaging:  · Predominant sinus bradycardia  · Average HR-53 bpm  · Hr 50 or above during awake times  · Hr in the low 40's or hig 30's when patient sleeping  · First degree AV block. No evidence of high degree AV block.  EKG:  Sinus bradycardia    *  I have personally reviewed these images and echo data    Assessment/Plan:  Diagnoses and all orders for this visit:    Bradycardia  -     Holter monitor - 48 hour; Future    Essential hypertension       her blood pressure is elevated in the office.  However her son had a detailed log of her blood pressure and her systolics has been consistently in the 140s to low 150s.  I do not think we need to start her on any antihypertensive medications currently based on her home blood pressure logs.  We will keep an eye on her heart rate.  I have asked her to inform us immediately if she has any episodes of dizziness or loss of consciousness.  We will perform a Holter in the next 6 months to a year re-evaluate her.  At this current time there is no indication for permanent pacemaker for her  asymptomatic bradycardia.  We have not received any records from his prior cardiologist.  Follow-up in about 6 months (around 2/28/2019).          Total duration of face to face visit time 30 minutes.  Total time spent counseling greater than fifty percent of total visit time.  Counseling included discussion regarding imaging findings, diagnosis, possibilities, treatment options, risks and benefits.  The patient had many questions regarding the options and long-term effect which were all answered to my best ability.      Wellington Grossman MD,MRCP,RPVI,FACC,FSCAI.  Interventional Cardiology   Phone [phone]

## 2018-08-29 ENCOUNTER — TELEPHONE (OUTPATIENT)
Dept: CARDIOLOGY | Facility: CLINIC | Age: 83
End: 2018-08-29

## 2018-08-29 NOTE — TELEPHONE ENCOUNTER
----- Message from Deja Wise sent at 8/29/2018  8:56 AM CDT -----  Type: Needs Medical Advice    Who Called: SonJenny Mansfield  Symptoms (please be specific):  Na  How long has patient had these symptoms:  Ariela  Pharmacy name and phone #:  [facility]  Best Call Back Number: [phone] (home)     Additional Information: Regarding the patient's Holter monitor and no records from Dr. Ortega

## 2018-08-29 NOTE — TELEPHONE ENCOUNTER
Called Dr. Javier' office back and spoke with Sharona, I advised her as to what was going on with getting a fax on ms. Mansfield that stated they had no records on her, but the son insist she was seen there. She looked it up and was about to find her records. I advised I would refax the release. No further issues noted.

## 2018-08-29 NOTE — TELEPHONE ENCOUNTER
Called and spoke with Mr. Mansfield, I advised him that I called and spoke with Dr. Javier' office this morning, and they stated that they had no records on her. He gave me 3 dates of when he seen her at the [facility] office. I advised him I would call them back and let them know.     He also stated that on the holter report it said the diary was incomplete, and wanted to know if that was a reasoning for the results of his mother's holter. I advised him that the diary is the paper that the tech gave her to write if she had any symptoms and what she was doing when she had them. He stated that he completed that. I advised I would have to speak with the Tech about this, but he is out of the office this week. He verbalized understanding. No further issues noted.

## 2018-09-10 ENCOUNTER — TELEPHONE (OUTPATIENT)
Dept: CARDIOLOGY | Facility: CLINIC | Age: 83
End: 2018-09-10

## 2018-09-10 NOTE — TELEPHONE ENCOUNTER
Called and spoke with Mr. Baker. I advised him that we did receive ms. Mahoney's records from Dr. Javier' office. He verbalized understanding. I advised him that the incomplete diary had no impacted on his mother's hotler results. He stated that Dr. Javier told them his mother needed a pacemaker. I advised I cannot speak for that doctor, but if that was not discussed with Dr. Grossman, then that all we can go back. He verbalized understanding. No further issues noted.

## 2018-09-10 NOTE — TELEPHONE ENCOUNTER
----- Message from Juliano Dumont sent at 9/10/2018  9:44 AM CDT -----  Contact: Pt son Avelino  The Pt is requesting a call back regarding the holter monitor being incomplete and needing another. Please call Pt to advise.     Avelino wants to speak to someone regarding this issue being finalized.     Dr oshea records: Avelino wants to know if they have been received. Avelino thinks this could be caused by the multiple offices not being linked.     Aamir Mooney is the clinician that did the holter monitor    Call Back#: [phone]  Thanks

## 2019-01-15 ENCOUNTER — TELEPHONE (OUTPATIENT)
Dept: CARDIOLOGY | Facility: CLINIC | Age: 84
End: 2019-01-15

## 2019-01-15 NOTE — TELEPHONE ENCOUNTER
Called pt back re: he wanted to keep 2/28 appt and same time if he could. ( former [facility] pt) Dr. Gray was booked that day, first available appt was 5/3. Pt was given the choice to call partnering doctors at [facility]. Pt given names and numbers and he agreed to call to get an earlier appt

## 2019-01-29 NOTE — PROGRESS NOTES
[facility] Hematology/Oncology  PROGRESS NOTE      Subjective:       Patient ID:   NAME: Maru Mansfield : 1927     89 y.o. female    Referring Doc: Curtis  Other Physicians: Eliseo Barron, Michael Floyd    Chief Complaint:  MGUS f/u    History of Present Illness:     Patient returns today for a regularly scheduled follow-up visit.  The patient is here with her son. She has been having some memory issues according to her son and they see Dr magana again on ; no CP, SOB, HA's or N/V; she has some decrease in appetite but up and down; she has residual occasional chronic cough. She had some labs done with Dr Barron couple weeks ago but again did not do the protein studies which I have ordered for her. She uses a walker to walk.         ROS:   GEN: normal without any fever, night sweats or weight loss; decreased appetite  HEENT: normal with no HA's, sore throat, stiff neck, changes in vision  CV: normal with no CP, SOB, PND, CREWS or orthopnea  PULM: normal with no SOB, hemoptysis, sputum or pleuritic pain; occ chronic cough   GI: normal with no abdominal pain, nausea, vomiting, constipation, diarrhea, melanotic stools, BRBPR, or hematemesis  : normal with no hematuria, dysuria  BREAST: normal with no mass, discharge, pain  SKIN: normal with no rash, erythema, bruising, or swelling    Allergies:  Review of patient's allergies indicates:  Allergies not on file    Medications:    Current Outpatient Medications:     amLODIPine (NORVASC) 5 MG tablet, Take 5 mg by mouth once daily., Disp: , Rfl:     aspirin (ECOTRIN) 81 MG EC tablet, Take 81 mg by mouth once daily., Disp: , Rfl:     calcitRIOL (ROCALTROL) 0.25 MCG Cap, , Disp: , Rfl:     calcium carbonate (OS-CHELITA) 600 mg calcium (1,500 mg) Tab, Take 600 mg by mouth once., Disp: , Rfl:     cyanocobalamin 500 MCG tablet, Take 500 mcg by mouth once daily., Disp: , Rfl:     fluticasone (FLONASE) 50 mcg/actuation nasal spray, , Disp: , Rfl:     furosemide (LASIX)  20 MG tablet, , Disp: , Rfl:     gabapentin (NEURONTIN) 100 MG capsule, , Disp: , Rfl:     ibuprofen (ADVIL,MOTRIN) 800 MG tablet, , Disp: , Rfl:     latanoprost 0.005 % ophthalmic solution, , Disp: , Rfl:     loratadine (CLARITIN) 10 mg tablet, Take 10 mg by mouth once daily., Disp: , Rfl:     lovastatin (MEVACOR) 20 MG tablet, , Disp: , Rfl:     magnesium 30 mg Tab, Take by mouth once., Disp: , Rfl:     melatonin 5 mg Tab, Take by mouth., Disp: , Rfl:     multivitamin capsule, Take 1 capsule by mouth once daily., Disp: , Rfl:     psyllium 0.52 gram capsule, Take 0.52 g by mouth once daily., Disp: , Rfl:     timolol maleate 0.5% (TIMOPTIC-XE) 0.5 % SolG, , Disp: , Rfl:     trospium (SANCTURA) 20 mg Tab tablet, , Disp: , Rfl:     PMHx/PSHx Updates:  See patient's last visit with me on 7/23/2018  See H&P on 3/17/2010        Pathology:  Cancer Staging  No matching staging information was found for the patient.          Objective:     Vitals:  Blood pressure (!) 141/68, pulse 66, temperature 97.4 °F (36.3 °C), resp. rate 20, weight 66.1 kg (145 lb 12.8 oz).    Physical Examination:   GEN: no apparent distress, comfortable; AAOx3  HEAD: atraumatic and normocephalic  EYES: no pallor, no icterus, PERRLA  ENT: OMM, no pharyngeal erythema, external ears WNL; no nasal discharge; no thrush  NECK: no masses, thyroid normal, trachea midline, no LAD/LN's, supple  CV: RRR with no murmur; normal pulse; normal S1 and S2; no pedal edema  CHEST: Normal respiratory effort; CTAB; normal breath sounds; no wheeze or crackles, she uses O2 at night  ABDOM: nontender and nondistended; soft; normal bowel sounds; no rebound/guarding  MUSC/Skeletal: ROM normal; no crepitus;chronic arthritic changes, she uses a walker  EXTREM: no clubbing, cyanosis, inflammation or swelling  SKIN: no rashes, lesions, ulcers, petechiae or subcutaneous nodules  : no bryan  NEURO: grossly intact; motor/sensory WNL; AAOx3; no tremors  PSYCH: normal  mood, affect and behavior  LYMPH: normal cervical, supraclavicular, axillary and groin LN's            Labs:     1/10/2019 cbc on chart        7/31/2018  Lab Results   Component Value Date    WBC 5.3 07/31/2018    HGB 14.0 07/31/2018    HCT 42.4 07/31/2018    MCV 91.8 07/31/2018     07/31/2018     BMP  Lab Results   Component Value Date     07/31/2018    K 3.9 07/31/2018     07/31/2018    CO2 25 07/31/2018    BUN 50 (H) 07/31/2018    CREATININE 1.25 (H) 07/31/2018    CALCIUM 9.9 07/31/2018    ESTGFRAFRICA 44 (L) 07/31/2018    EGFRNONAA 38 (L) 07/31/2018     Lab Results   Component Value Date    ALT 19 07/31/2018    AST 29 07/31/2018    ALKPHOS 117 07/31/2018    BILITOT 0.8 07/31/2018 7/31/2018  Protein Electrophoresis Random Urine   Order: [id number]   Status:  Final result   Visible to patient:  No (Not Released) Next appt:  None    Ref Range & Units 6mo ago  (7/31/18) 6mo ago  (7/31/18) 6mo ago  (7/31/18)   Albumin % 100  3.9 R 4.1 R   Alpha-1-Globulins %  0.3 R    Comment:                  NONE DETECTED   Alpha-2 Globulins %      Comment:                  NONE DETECTED   Beta Globulins %      Comment:                  NONE DETECTED   Gamma Globulin %  1.3 R    Comment:                  NONE DETECTED   Interpretation    CM    Comment:     Faint albumin band detected on urine protein electrophoresis.   No abnormal protein bands (Bence-Boswell proteinuria) detected.           Protein Electrophoresis   Order: [id number]   Status:  Final result   Visible to patient:  No (Not Released) Next appt:  None    Ref Range & Units 6mo ago  (7/31/18) 6mo ago  (7/31/18) 6mo ago  (7/31/18)   Albumin 3.8 - 4.8 g/dL 3.9  100 R 4.1 R   Alpha-1-Globulins 0.2 - 0.3 g/dL 0.3   R, CM    Alpha-2-Globulins 0.5 - 0.9 g/dL 0.8      Beta Globulin 0.4 - 0.6 g/dL 0.5      Beta-2 Microglobulin 0.2 - 0.5 g/dL 0.3      Gamma Globulin 0.8 - 1.7 g/dL 1.3   R, CM    Abnormal Protein Band NONE DETECTED g/dL 0.8 Abnormally high              Immunoglobulin free LT chains blood   Order: [id number]   Status:  Final result   Visible to patient:  No (Not Released) Next appt:  None Dx:  Monoclonal paraproteinemia    Ref Range & Units 6mo ago 1yr ago   Kappa Light Chain, Free, Serum 3.3 - 19.4 mg/L 28.7 Abnormally high   27.3 Abnormally high     Lambda Light Chain, Free, Serum 5.7 - 26.3 mg/L 20.6  19.7    Kappa/Lambda Light Chains Free with Ratio, Serum 0.26 - 1.65 1.39             Beta 2 Microglobulin, Serum < OR = 2.51 mg/L 4.47       Immunoglobulins (IgG, IgA, IgM) Quantitative   Order: [id number]   Status:  Final result   Visible to patient:  No (Not Released) Next appt:  None Dx:  Monoclonal paraproteinemia    Ref Range & Units 6mo ago 1yr ago   IgA 81 - 463 mg/dL 145  126    IgG, Serum 694 - 1,618 mg/dL 1,409  1,167    IgM 48 - 271 mg/dL 80  61                Radiology/Diagnostic Studies:    No results found.    I have reviewed all available lab results and radiology reports.    Assessment/Plan:   (1) 89 y.o. female with diagnosis of MGUS  - she has not had any recent labs or urine studies since Jan 2018  - due for repeat studies this month  - protein band was at 0.8 in July 2018  - Ig panel was stable and WNL  - last Beta-2 microglobulin was 4.47  - kappa/lambda ratio was good at 1.39    (2) Chronic anemia with multifactorial etiology including anemia of chronic disorders and anemia of chronic renal disease  - latest hgb wnl at  14.3      (3) HTN - followed by Dr Acevedo; BP still a little high    (4) CRI - followed by Dr Barron with neph    (5) Basal cell skin ca - followed by Dr Rodrigez with derm; she completed a 3 month regimen of oral therapy per Dr Rodrigez's direction    (6) Atrial fibrillation - followed by Dr Javier with cardiology in past and now she sees Dr Michael Floyd; hospitalized in Nov 2017    (7) Noncompliance with labs studies    1. Monoclonal paraproteinemia     2. Anemia, chronic disease     3. Anemia in chronic kidney  disease, unspecified CKD stage           PLAN:  1. Reorder protein studies - encouraged repeat protein studies every 6 months  2. F/u with PCP, Neph and Derm  3. RTC in 6 months  4. Encouraged compliance  Fax note to Jose Marino, Kannan Acevedo MD, [place]    I spent over 15 mins of time with the patient. Over half of this time was spent couseling and coordinating care: reviewing materials, labs, reports and studies; making treatment and analytical decisions; ordering necessary labs, tests and studies; and discussing treatment options and setting up treatment plans.      Discussion:     I have explained all of the above in detail and the patient understands all of the current recommendation(s). I have answered all of their questions to the best of my ability and to their complete satisfaction.   The patient is to continue with the current management plan.            Electronically signed by Tomas Fregoso MD

## 2019-01-30 ENCOUNTER — OFFICE VISIT (OUTPATIENT)
Dept: HEMATOLOGY/ONCOLOGY | Facility: CLINIC | Age: 84
End: 2019-01-30
Payer: MEDICARE

## 2019-01-30 VITALS
WEIGHT: 145.81 LBS | DIASTOLIC BLOOD PRESSURE: 68 MMHG | BODY MASS INDEX: 23.53 KG/M2 | TEMPERATURE: 97 F | HEART RATE: 66 BPM | SYSTOLIC BLOOD PRESSURE: 141 MMHG | RESPIRATION RATE: 20 BRPM

## 2019-01-30 DIAGNOSIS — N18.9 ANEMIA IN CHRONIC KIDNEY DISEASE, UNSPECIFIED CKD STAGE: ICD-10-CM

## 2019-01-30 DIAGNOSIS — E46 PROTEIN-CALORIE MALNUTRITION, UNSPECIFIED SEVERITY: ICD-10-CM

## 2019-01-30 DIAGNOSIS — D53.9 NUTRITIONAL ANEMIA: ICD-10-CM

## 2019-01-30 DIAGNOSIS — D63.1 ANEMIA IN CHRONIC KIDNEY DISEASE, UNSPECIFIED CKD STAGE: ICD-10-CM

## 2019-01-30 DIAGNOSIS — D63.8 ANEMIA, CHRONIC DISEASE: ICD-10-CM

## 2019-01-30 DIAGNOSIS — D47.2 MONOCLONAL PARAPROTEINEMIA: Primary | ICD-10-CM

## 2019-01-30 PROCEDURE — 1101F PT FALLS ASSESS-DOCD LE1/YR: CPT | Mod: ,,, | Performed by: INTERNAL MEDICINE

## 2019-01-30 PROCEDURE — 99213 OFFICE O/P EST LOW 20 MIN: CPT | Mod: ,,, | Performed by: INTERNAL MEDICINE

## 2019-01-30 PROCEDURE — 99213 PR OFFICE/OUTPT VISIT, EST, LEVL III, 20-29 MIN: ICD-10-PCS | Mod: ,,, | Performed by: INTERNAL MEDICINE

## 2019-01-30 PROCEDURE — 1101F PR PT FALLS ASSESS DOC 0-1 FALLS W/OUT INJ PAST YR: ICD-10-PCS | Mod: ,,, | Performed by: INTERNAL MEDICINE

## 2019-01-30 RX ORDER — CALCIUM CARBONATE 600 MG
600 TABLET ORAL DAILY
Status: ON HOLD | COMMUNITY
End: 2021-01-01 | Stop reason: HOSPADM

## 2019-01-30 RX ORDER — BROMPHENIRAMINE MALEATE, DEXTROMETHORPHAN HBR, PHENYLEPHRINE HCL, DIPHENHYDRAMINE HCL, PHENYLEPHRINE HCL 0.52G
1.56 KIT ORAL DAILY
Status: ON HOLD | COMMUNITY
End: 2021-01-01 | Stop reason: HOSPADM

## 2019-01-30 RX ORDER — AMLODIPINE BESYLATE 5 MG/1
5 TABLET ORAL DAILY
COMMUNITY

## 2019-01-30 RX ORDER — LORATADINE 10 MG/1
10 TABLET ORAL DAILY
Status: ON HOLD | COMMUNITY
End: 2021-01-01 | Stop reason: HOSPADM

## 2019-01-30 NOTE — LETTER
January 30, 2019      Kannan Acevedo MD  [address]  [address]  [facility]  [place] [address]           [facility] - Hematology Oncology  [address]  [address]  [place] [address]  Phone: [phone]  Fax: [phone]          Patient: Maru Mansfield   MR Number: [id number]   YOB: 1927   Date of Visit: 1/30/2019       Dear Dr. Kannan Acevedo:    Thank you for referring Maru Mansfield to me for evaluation. Attached you will find relevant portions of my assessment and plan of care.    If you have questions, please do not hesitate to call me. I look forward to following Maru Mansfield along with you.    Sincerely,    Tomas Fregoso MD    Enclosure  CC:  No Recipients    If you would like to receive this communication electronically, please contact [email or web address] or [phone] to request more information on [employer] access.    For providers and/or their staff who would like to refer a patient to [facility], please contact us through our one-stop-shop provider referral line, [facility], at [phone].    If you feel you have received this communication in error or would no longer like to receive these types of communications, please e-mail [email or web address]

## 2019-02-12 LAB
ALBUMIN SERPL ELPH-MCNC: 3.6 G/DL (ref 3.8–4.8)
ALBUMIN SERPL-MCNC: 3.8 G/DL (ref 3.6–5.1)
ALBUMIN/GLOB SERPL: 1.2 (CALC) (ref 1–2.5)
ALP SERPL-CCNC: 108 U/L (ref 33–130)
ALPHA1 GLOB SERPL ELPH-MCNC: 0.4 G/DL (ref 0.2–0.3)
ALPHA2 GLOB SERPL ELPH-MCNC: 1 G/DL (ref 0.5–0.9)
ALT SERPL-CCNC: 30 U/L (ref 6–29)
AST SERPL-CCNC: 40 U/L (ref 10–35)
B2 MICROGLOB SERPL-MCNC: 5.25 MG/L
BASOPHILS # BLD AUTO: 131 CELLS/UL (ref 0–200)
BASOPHILS NFR BLD AUTO: 1.8 %
BETA1 GLOB SERPL ELPH-MCNC: 0.5 G/DL (ref 0.4–0.6)
BETA2 GLOB SERPL ELPH-MCNC: 0.3 G/DL (ref 0.2–0.5)
BILIRUB SERPL-MCNC: 0.9 MG/DL (ref 0.2–1.2)
BUN SERPL-MCNC: 48 MG/DL (ref 7–25)
BUN/CREAT SERPL: 29 (CALC) (ref 6–22)
CALCIUM SERPL-MCNC: 9.3 MG/DL (ref 8.6–10.4)
CHLORIDE SERPL-SCNC: 101 MMOL/L (ref 98–110)
CO2 SERPL-SCNC: 27 MMOL/L (ref 20–32)
CREAT SERPL-MCNC: 1.68 MG/DL (ref 0.6–0.88)
EOSINOPHIL # BLD AUTO: 1168 CELLS/UL (ref 15–500)
EOSINOPHIL NFR BLD AUTO: 16 %
ERYTHROCYTE [DISTWIDTH] IN BLOOD BY AUTOMATED COUNT: 13.1 % (ref 11–15)
FOLATE SERPL-MCNC: >24 NG/ML
GAMMA GLOB SERPL ELPH-MCNC: 1.5 G/DL (ref 0.8–1.7)
GFRSERPLBLD MDRD-ARVRAT: 26 ML/MIN/1.73M2
GLOBULIN SER CALC-MCNC: 3.3 G/DL (CALC) (ref 1.9–3.7)
GLUCOSE SERPL-MCNC: 124 MG/DL (ref 65–99)
HCT VFR BLD AUTO: 45.7 % (ref 35–45)
HGB BLD-MCNC: 15.3 G/DL (ref 11.7–15.5)
IGA SERPL-MCNC: 188 MG/DL (ref 81–463)
IGG SERPL-MCNC: 1565 MG/DL (ref 694–1618)
IGM SERPL-MCNC: 93 MG/DL (ref 48–271)
KAPPA LC FREE SER-MCNC: 33.5 MG/L (ref 3.3–19.4)
KAPPA LC FREE/LAMBDA FREE SER: 1.36 {RATIO} (ref 0.26–1.65)
LAMBDA LC FREE SERPL-MCNC: 24.6 MG/L (ref 5.7–26.3)
LYMPHOCYTES # BLD AUTO: 1161 CELLS/UL (ref 850–3900)
LYMPHOCYTES NFR BLD AUTO: 15.9 %
M PROTEIN 1 SERPL ELPH-MCNC: 1 G/DL
MCH RBC QN AUTO: 30.8 PG (ref 27–33)
MCHC RBC AUTO-ENTMCNC: 33.5 G/DL (ref 32–36)
MCV RBC AUTO: 92 FL (ref 80–100)
MONOCYTES # BLD AUTO: 599 CELLS/UL (ref 200–950)
MONOCYTES NFR BLD AUTO: 8.2 %
NEUTROPHILS # BLD AUTO: 4241 CELLS/UL (ref 1500–7800)
NEUTROPHILS NFR BLD AUTO: 58.1 %
PLATELET # BLD AUTO: 287 THOUSAND/UL (ref 140–400)
PMV BLD REES-ECKER: 11.2 FL (ref 7.5–12.5)
POTASSIUM SERPL-SCNC: 3.6 MMOL/L (ref 3.5–5.3)
PROT PATTERN SERPL ELPH-IMP: ABNORMAL
PROT SERPL-MCNC: 7.1 G/DL (ref 6.1–8.1)
PROT SERPL-MCNC: 7.4 G/DL (ref 6.1–8.1)
RBC # BLD AUTO: 4.97 MILLION/UL (ref 3.8–5.1)
SODIUM SERPL-SCNC: 141 MMOL/L (ref 135–146)
VIT B12 SERPL-MCNC: 1103 PG/ML (ref 200–1100)
WBC # BLD AUTO: 7.3 THOUSAND/UL (ref 3.8–10.8)

## 2019-02-18 LAB
ALBUMIN ?TM MFR UR ELPH: 100 %
ALPHA1 GLOB ?TM MFR UR ELPH: NORMAL %
ALPHA2 GLOB ?TM MFR UR ELPH: NORMAL %
B-GLOBULIN ?TM MFR UR ELPH: NORMAL %
CREAT 24H UR-MRATE: 0.85 G/24 H (ref 0.5–2.15)
GAMMA GLOB ?TM MFR UR ELPH: NORMAL %
PROT 24H UR-MRATE: 78 MG/24 H
PROT PATTERN UR ELPH-IMP: NORMAL
PROT/CREAT 24H UR: 92 MG/G CREAT

## 2019-02-19 ENCOUNTER — TELEPHONE (OUTPATIENT)
Dept: HEMATOLOGY/ONCOLOGY | Facility: CLINIC | Age: 84
End: 2019-02-19

## 2019-02-19 NOTE — TELEPHONE ENCOUNTER
Called son told him that she did not have myeloma according to Ildefonso and she neds to keep appointments in 6 months       ----- Message from Cathy Ogden sent at 2/19/2019  9:17 AM CST -----  Contact: Avelino Mansfield, son  Pt had labs done and would like a nurse to call them back with results.  #24 hour protein urine workup-what was Dr. Fregoso looking for and how did it turn out?    CB# [phone]      Thanks,  Cathy

## 2019-02-26 ENCOUNTER — TELEPHONE (OUTPATIENT)
Dept: HEMATOLOGY/ONCOLOGY | Facility: CLINIC | Age: 84
End: 2019-02-26

## 2019-02-26 NOTE — TELEPHONE ENCOUNTER
Called told him Ildefonso prefers eliquis and gabapentin is not an NSAID and that a full dose aspirin is not a viable long term option     ----- Message from Cathy Ogden sent at 2/26/2019  9:02 AM CST -----  Contact: Avelino, son  #2nd call#  Avelino, son called back to see if a decision was made regarding the blood thinner and his mother was.  #see previous message#.  Avelino stated he was overloaded with information and needed my answer broken down.  Keaton will call him back.    # [phone]

## 2019-07-10 ENCOUNTER — TELEPHONE (OUTPATIENT)
Dept: HEMATOLOGY/ONCOLOGY | Facility: CLINIC | Age: 84
End: 2019-07-10

## 2019-07-10 NOTE — TELEPHONE ENCOUNTER
Called and spoke w/ pt son to clarify whether the Urine was collected and turned in w/ the labs on 7/8.  Pt did turn over the urine, but sylvia stated that the  was unable to collect all the blood that was ordered.    Coordinating w/ the  @ dr. Acevedo office, Yrn, we were able to find out which labs weren't done and she will attempt to add the missing test to the blood that was drawn.    Pt understood and appreciated our help as his mother is a hard stick when it comes to blood.

## 2019-07-19 ENCOUNTER — TELEPHONE (OUTPATIENT)
Dept: HEMATOLOGY/ONCOLOGY | Facility: CLINIC | Age: 84
End: 2019-07-19

## 2019-07-19 DIAGNOSIS — D53.9 NUTRITIONAL ANEMIA: ICD-10-CM

## 2019-07-19 DIAGNOSIS — D63.1 ANEMIA IN CHRONIC KIDNEY DISEASE, UNSPECIFIED CKD STAGE: ICD-10-CM

## 2019-07-19 DIAGNOSIS — E46 PROTEIN-CALORIE MALNUTRITION, UNSPECIFIED SEVERITY: ICD-10-CM

## 2019-07-19 DIAGNOSIS — D47.2 MONOCLONAL PARAPROTEINEMIA: Primary | ICD-10-CM

## 2019-07-19 DIAGNOSIS — N18.9 ANEMIA IN CHRONIC KIDNEY DISEASE, UNSPECIFIED CKD STAGE: ICD-10-CM

## 2019-07-19 DIAGNOSIS — D63.8 ANEMIA, CHRONIC DISEASE: ICD-10-CM

## 2019-07-19 NOTE — TELEPHONE ENCOUNTER
Called to tell the pt's son that the pt needs to have the labs that were missed redrawn before the appt.     The pt's son stated that the pt is sleeping a lot and isn't doing well.     He will bring the pt to Dr. Acevedo's office to do the labs and to get the 24hr urine jug.     Ovidio ZEPEDA

## 2019-07-22 ENCOUNTER — TELEPHONE (OUTPATIENT)
Dept: INFUSION THERAPY | Facility: HOSPITAL | Age: 84
End: 2019-07-22

## 2019-07-22 NOTE — TELEPHONE ENCOUNTER
Pt called to clarify that the pt didn't need to fast for any of the labs that need to be done today.  I let him know that that is not necessary.    Pt will  the 24hr jug w/ [facility] and have the labs done  today.

## 2019-07-23 LAB
BASOPHILS # BLD AUTO: 72 CELLS/UL (ref 0–200)
BASOPHILS NFR BLD AUTO: 1.1 %
EOSINOPHIL # BLD AUTO: 566 CELLS/UL (ref 15–500)
EOSINOPHIL NFR BLD AUTO: 8.7 %
ERYTHROCYTE [DISTWIDTH] IN BLOOD BY AUTOMATED COUNT: 13.2 % (ref 11–15)
HCT VFR BLD AUTO: 40.6 % (ref 35–45)
HGB BLD-MCNC: 13.6 G/DL (ref 11.7–15.5)
LYMPHOCYTES # BLD AUTO: 1359 CELLS/UL (ref 850–3900)
LYMPHOCYTES NFR BLD AUTO: 20.9 %
MCH RBC QN AUTO: 30.9 PG (ref 27–33)
MCHC RBC AUTO-ENTMCNC: 33.5 G/DL (ref 32–36)
MCV RBC AUTO: 92.3 FL (ref 80–100)
MONOCYTES # BLD AUTO: 618 CELLS/UL (ref 200–950)
MONOCYTES NFR BLD AUTO: 9.5 %
NEUTROPHILS # BLD AUTO: 3887 CELLS/UL (ref 1500–7800)
NEUTROPHILS NFR BLD AUTO: 59.8 %
PLATELET # BLD AUTO: 194 THOUSAND/UL (ref 140–400)
PMV BLD REES-ECKER: 11.3 FL (ref 7.5–12.5)
RBC # BLD AUTO: 4.4 MILLION/UL (ref 3.8–5.1)
VIT B12 SERPL-MCNC: 785 PG/ML (ref 200–1100)
WBC # BLD AUTO: 6.5 THOUSAND/UL (ref 3.8–10.8)

## 2019-07-24 LAB — FOLATE SERPL-MCNC: >24 NG/ML

## 2019-07-29 NOTE — PROGRESS NOTES
[facility] Hematology/Oncology  PROGRESS NOTE      Subjective:       Patient ID:   NAME: Maru Mansfield : 1927     89 y.o. female    Referring Doc: Curtis  Other Physicians: Eliseo Barron, Michael Floyd    Chief Complaint:  MGUS f/u    History of Present Illness:     Patient returns today for a regularly scheduled follow-up visit.  The patient is here with her son. She has been having some memory issues according to her son ; no CP, SOB, HA's or N/V; she has some decrease in appetite but up and down; She uses a walker to walk and occasionally wheelchair; she is in wheelchair today.         ROS:   GEN: normal without any fever, night sweats or weight loss; decreased appetite  HEENT: normal with no HA's, sore throat, stiff neck, changes in vision  CV: normal with no CP, SOB, PND, CREWS or orthopnea  PULM: normal with no SOB, hemoptysis, sputum or pleuritic pain; occ chronic cough   GI: normal with no abdominal pain, nausea, vomiting, constipation, diarrhea, melanotic stools, BRBPR, or hematemesis  : normal with no hematuria, dysuria  BREAST: normal with no mass, discharge, pain  SKIN: normal with no rash, erythema, bruising, or swelling    Allergies:  Review of patient's allergies indicates:  Allergies not on file    Medications:    Current Outpatient Medications:     amLODIPine (NORVASC) 5 MG tablet, Take 5 mg by mouth once daily., Disp: , Rfl:     aspirin (ECOTRIN) 81 MG EC tablet, Take 81 mg by mouth once daily., Disp: , Rfl:     calcitRIOL (ROCALTROL) 0.25 MCG Cap, , Disp: , Rfl:     calcium carbonate (OS-CHELITA) 600 mg calcium (1,500 mg) Tab, Take 600 mg by mouth once., Disp: , Rfl:     cyanocobalamin 500 MCG tablet, Take 500 mcg by mouth once daily., Disp: , Rfl:     fluticasone (FLONASE) 50 mcg/actuation nasal spray, , Disp: , Rfl:     furosemide (LASIX) 20 MG tablet, , Disp: , Rfl:     gabapentin (NEURONTIN) 100 MG capsule, , Disp: , Rfl:     ibuprofen (ADVIL,MOTRIN) 800 MG tablet, , Disp: , Rfl:      latanoprost 0.005 % ophthalmic solution, , Disp: , Rfl:     loratadine (CLARITIN) 10 mg tablet, Take 10 mg by mouth once daily., Disp: , Rfl:     lovastatin (MEVACOR) 20 MG tablet, , Disp: , Rfl:     magnesium 30 mg Tab, Take by mouth once., Disp: , Rfl:     melatonin 5 mg Tab, Take by mouth., Disp: , Rfl:     multivitamin capsule, Take 1 capsule by mouth once daily., Disp: , Rfl:     psyllium 0.52 gram capsule, Take 0.52 g by mouth once daily., Disp: , Rfl:     timolol maleate 0.5% (TIMOPTIC-XE) 0.5 % SolG, , Disp: , Rfl:     trospium (SANCTURA) 20 mg Tab tablet, , Disp: , Rfl:     PMHx/PSHx Updates:  See patient's last visit with me on 1/30/2019  See H&P on 3/17/2010        Pathology:  Cancer Staging  No matching staging information was found for the patient.          Objective:     Vitals:  Blood pressure (!) 152/79, pulse (!) 59, temperature 97.4 °F (36.3 °C), temperature source Oral, resp. rate 20, weight 63.1 kg (139 lb 1.6 oz).    Physical Examination:   GEN: no apparent distress, comfortable; AAOx3  HEAD: atraumatic and normocephalic  EYES: no pallor, no icterus, PERRLA  ENT: OMM, no pharyngeal erythema, external ears WNL; no nasal discharge; no thrush  NECK: no masses, thyroid normal, trachea midline, no LAD/LN's, supple  CV: RRR with no murmur; normal pulse; normal S1 and S2; no pedal edema  CHEST: Normal respiratory effort; CTAB; normal breath sounds; no wheeze or crackles, she uses O2 at night  ABDOM: nontender and nondistended; soft; normal bowel sounds; no rebound/guarding  MUSC/Skeletal: ROM normal; no crepitus;chronic arthritic changes, she is in wheelchair today  EXTREM: no clubbing, cyanosis, inflammation or swelling  SKIN: no rashes, lesions, ulcers, petechiae or subcutaneous nodules  : no bryan  NEURO: grossly intact; motor/sensory WNL; AAOx3; no tremors  PSYCH: normal mood, affect and behavior  LYMPH: normal cervical, supraclavicular, axillary and groin LN's            Labs:      7/8/2019  Lab Results   Component Value Date    WBC 6.5 07/22/2019    HGB 13.6 07/22/2019    HCT 40.6 07/22/2019    MCV 92.3 07/22/2019     07/22/2019     BMP  Lab Results   Component Value Date     07/08/2019    K 4.3 07/08/2019     07/08/2019    CO2 23 07/08/2019    BUN 36 (H) 07/08/2019    CREATININE 1.25 (H) 07/08/2019    CALCIUM 10.0 07/08/2019    ESTGFRAFRICA 43 (L) 07/08/2019    EGFRNONAA 37 (L) 07/08/2019     Lab Results   Component Value Date    ALT 17 07/08/2019    AST 29 07/08/2019    ALKPHOS 119 07/08/2019    BILITOT 0.7 07/08/2019       Beta 2 Microglobulin, Serum < OR = 2.51 mg/L 4.05High      Immunoglobulins (IgG, IgA, IgM) Quantitative   Order: [id number]   Status:  Final result   Visible to patient:  No (Not Released) Next appt:  None    Ref Range & Units 3wk ago 5mo ago   IgA 20 - 320 mg/dL 144  188 R   IgG, Serum 600 - 1,540 mg/dL 1,444  1,565 R   IgM 50 - 300 mg/dL 71  93            Contains abnormal data Immunoglobulin free LT chains blood   Order: [id number]   Status:  Final result   Visible to patient:  No (Not Released) Next appt:  None    Ref Range & Units 3wk ago 5mo ago   Kappa Light Chain, Free, Serum 3.3 - 19.4 mg/L 27.0High   33.5High     Lambda Light Chain, Free, Serum 5.7 - 26.3 mg/L 18.1  24.6    Kappa/Lambda Light Chains Free with Ratio, Serum 0.26 - 1.65 1.49  1.36 CM           Protein Electrophoresis   Order: [id number]   Status:  Final result   Visible to patient:  No (Not Released) Next appt:  None    Ref Range & Units 3wk ago  (7/8/19) 3wk ago  (7/8/19) 3wk ago  (7/8/19)   Albumin 3.8 - 4.8 g/dL 3.7Low   3.7 R    Alpha-1-Globulins 0.2 - 0.3 g/dL 0.3      Alpha-2-Globulins 0.5 - 0.9 g/dL 0.8      Beta Globulin 0.4 - 0.6 g/dL 0.4      Beta-2 Microglobulin 0.2 - 0.5 g/dL 0.3      Gamma Globulin 0.8 - 1.7 g/dL 1.3      Abnormal Protein Band NONE DETECTED g/dL 0.8High              Immunofixation electrophoresis   Order: [id number]   Status:  Final result   Visible  to patient:  No (Not Released) Next appt:  None Dx:  Monoclonal paraproteinemia; Anemia, c...   Component 3wk ago   Interpretation    Comment: IgG kappa monoclonal band present.               Radiology/Diagnostic Studies:    No results found.    I have reviewed all available lab results and radiology reports.    Assessment/Plan:   (1) 89 y.o. female with diagnosis of MGUS  - UPEP was just sent in today and pending  - protein band was at 0.8 and stable  - Ig panel was stable and WNL  - last Beta-2 microglobulin was 4.05 and better  - kappa/lambda ratio was good at 1.49    (2) Chronic anemia with multifactorial etiology including anemia of chronic disorders and anemia of chronic renal disease  - latest hgb wnl at  13.6     (3) HTN - followed by Dr Acevedo; BP still a little high    (4) CRI - followed by Dr Barron with neph    (5) Basal cell skin ca - followed by Dr Rodrigez with derm; she completed a 3 month regimen of oral therapy per Dr Rodrigez's direction    (6) Atrial fibrillation - followed by Dr Javier with cardiology in past and now she sees Dr Michael Floyd; hospitalized in Nov 2017    (7) Noncompliance with labs studies    1. Anemia, chronic disease     2. Anemia in chronic kidney disease, unspecified CKD stage     3. Monoclonal paraproteinemia           PLAN:  1.  Repeat protein studies every 6 months  2. F/u with PCP, Neph and Derm  3. RTC in 6 months  4. Encouraged compliance  Fax note to Jose Marino, Kannan Acevedo MD, [place] and Elia    I spent over 15 mins of time with the patient. Over half of this time was spent couseling and coordinating care: reviewing materials, labs, reports and studies; making treatment and analytical decisions; ordering necessary labs, tests and studies; and discussing treatment options and setting up treatment plans.      Discussion:     I have explained all of the above in detail and the patient understands all of the current recommendation(s). I have answered all of their  questions to the best of my ability and to their complete satisfaction.   The patient is to continue with the current management plan.            Electronically signed by Tomas Fregoso MD

## 2019-07-30 ENCOUNTER — OFFICE VISIT (OUTPATIENT)
Dept: HEMATOLOGY/ONCOLOGY | Facility: CLINIC | Age: 84
End: 2019-07-30
Payer: MEDICARE

## 2019-07-30 VITALS
HEART RATE: 59 BPM | WEIGHT: 139.13 LBS | RESPIRATION RATE: 20 BRPM | SYSTOLIC BLOOD PRESSURE: 152 MMHG | DIASTOLIC BLOOD PRESSURE: 79 MMHG | TEMPERATURE: 97 F | BODY MASS INDEX: 22.45 KG/M2

## 2019-07-30 DIAGNOSIS — N18.9 ANEMIA IN CHRONIC KIDNEY DISEASE, UNSPECIFIED CKD STAGE: ICD-10-CM

## 2019-07-30 DIAGNOSIS — D63.8 ANEMIA, CHRONIC DISEASE: Primary | ICD-10-CM

## 2019-07-30 DIAGNOSIS — D63.1 ANEMIA IN CHRONIC KIDNEY DISEASE, UNSPECIFIED CKD STAGE: ICD-10-CM

## 2019-07-30 DIAGNOSIS — D47.2 MONOCLONAL PARAPROTEINEMIA: ICD-10-CM

## 2019-07-30 PROCEDURE — 3288F PR FALLS RISK ASSESSMENT DOCUMENTED: ICD-10-PCS | Mod: S$GLB,,, | Performed by: INTERNAL MEDICINE

## 2019-07-30 PROCEDURE — 1100F PTFALLS ASSESS-DOCD GE2>/YR: CPT | Mod: S$GLB,,, | Performed by: INTERNAL MEDICINE

## 2019-07-30 PROCEDURE — 1100F PR PT FALLS ASSESS DOC 2+ FALLS/FALL W/INJURY/YR: ICD-10-PCS | Mod: S$GLB,,, | Performed by: INTERNAL MEDICINE

## 2019-07-30 PROCEDURE — 3288F FALL RISK ASSESSMENT DOCD: CPT | Mod: S$GLB,,, | Performed by: INTERNAL MEDICINE

## 2019-07-30 PROCEDURE — 99213 PR OFFICE/OUTPT VISIT, EST, LEVL III, 20-29 MIN: ICD-10-PCS | Mod: S$GLB,,, | Performed by: INTERNAL MEDICINE

## 2019-07-30 PROCEDURE — 99213 OFFICE O/P EST LOW 20 MIN: CPT | Mod: S$GLB,,, | Performed by: INTERNAL MEDICINE

## 2019-08-02 LAB
ALBUMIN ?TM MFR UR ELPH: 100 %
ALPHA1 GLOB ?TM MFR UR ELPH: NORMAL %
ALPHA2 GLOB ?TM MFR UR ELPH: NORMAL %
B-GLOBULIN ?TM MFR UR ELPH: NORMAL %
CREAT 24H UR-MRATE: 0.83 G/24 H (ref 0.5–2.15)
GAMMA GLOB ?TM MFR UR ELPH: NORMAL %
PROT 24H UR-MRATE: 84 MG/24 H
PROT PATTERN UR ELPH-IMP: NORMAL
PROT/CREAT 24H UR: 102 MG/G CREAT

## 2019-08-20 ENCOUNTER — HOSPITAL ENCOUNTER (OUTPATIENT)
Facility: HOSPITAL | Age: 84
Discharge: HOME | End: 2019-08-22
Attending: EMERGENCY MEDICINE | Admitting: HOSPITALIST
Payer: MEDICARE

## 2019-08-20 DIAGNOSIS — M25.519 SHOULDER PAIN: ICD-10-CM

## 2019-08-20 DIAGNOSIS — R07.9 CHEST PAIN: ICD-10-CM

## 2019-08-20 DIAGNOSIS — R00.1 BRADYCARDIA: ICD-10-CM

## 2019-08-20 PROBLEM — F03.90 DEMENTIA: Status: ACTIVE | Noted: 2019-08-20

## 2019-08-20 PROBLEM — I48.91 ATRIAL FIBRILLATION WITH SLOW VENTRICULAR RESPONSE: Status: ACTIVE | Noted: 2019-08-20

## 2019-08-20 PROBLEM — R79.89 ELEVATED TROPONIN: Status: ACTIVE | Noted: 2019-08-20

## 2019-08-20 LAB
ALBUMIN SERPL BCP-MCNC: 3.7 G/DL (ref 3.5–5.2)
ALP SERPL-CCNC: 93 U/L (ref 55–135)
ALT SERPL W/O P-5'-P-CCNC: 20 U/L (ref 10–44)
ANION GAP SERPL CALC-SCNC: 8 MMOL/L (ref 8–16)
AST SERPL-CCNC: 30 U/L (ref 10–40)
BASOPHILS # BLD AUTO: 0.05 K/UL (ref 0–0.2)
BASOPHILS NFR BLD: 0.9 % (ref 0–1.9)
BILIRUB SERPL-MCNC: 0.7 MG/DL (ref 0.1–1)
BUN SERPL-MCNC: 42 MG/DL (ref 10–30)
CALCIUM SERPL-MCNC: 9.8 MG/DL (ref 8.7–10.5)
CHLORIDE SERPL-SCNC: 104 MMOL/L (ref 95–110)
CO2 SERPL-SCNC: 26 MMOL/L (ref 23–29)
CREAT SERPL-MCNC: 1.2 MG/DL (ref 0.5–1.4)
DIFFERENTIAL METHOD: ABNORMAL
EOSINOPHIL # BLD AUTO: 0.5 K/UL (ref 0–0.5)
EOSINOPHIL NFR BLD: 9 % (ref 0–8)
ERYTHROCYTE [DISTWIDTH] IN BLOOD BY AUTOMATED COUNT: 14.2 % (ref 11.5–14.5)
EST. GFR  (AFRICAN AMERICAN): 45.3 ML/MIN/1.73 M^2
EST. GFR  (NON AFRICAN AMERICAN): 39.3 ML/MIN/1.73 M^2
GLUCOSE SERPL-MCNC: 110 MG/DL (ref 70–110)
HCT VFR BLD AUTO: 42.3 % (ref 37–48.5)
HGB BLD-MCNC: 13.7 G/DL (ref 12–16)
IMM GRANULOCYTES # BLD AUTO: 0.01 K/UL (ref 0–0.04)
IMM GRANULOCYTES NFR BLD AUTO: 0.2 % (ref 0–0.5)
LYMPHOCYTES # BLD AUTO: 1.4 K/UL (ref 1–4.8)
LYMPHOCYTES NFR BLD: 26 % (ref 18–48)
MAGNESIUM SERPL-MCNC: 2.1 MG/DL (ref 1.6–2.6)
MCH RBC QN AUTO: 30.7 PG (ref 27–31)
MCHC RBC AUTO-ENTMCNC: 32.4 G/DL (ref 32–36)
MCV RBC AUTO: 95 FL (ref 82–98)
MONOCYTES # BLD AUTO: 0.5 K/UL (ref 0.3–1)
MONOCYTES NFR BLD: 8.8 % (ref 4–15)
NEUTROPHILS # BLD AUTO: 3 K/UL (ref 1.8–7.7)
NEUTROPHILS NFR BLD: 55.1 % (ref 38–73)
NRBC BLD-RTO: 0 /100 WBC
PLATELET # BLD AUTO: 185 K/UL (ref 150–350)
PMV BLD AUTO: 10.9 FL (ref 9.2–12.9)
POTASSIUM SERPL-SCNC: 3.8 MMOL/L (ref 3.5–5.1)
PROT SERPL-MCNC: 7.3 G/DL (ref 6–8.4)
RBC # BLD AUTO: 4.46 M/UL (ref 4–5.4)
SODIUM SERPL-SCNC: 138 MMOL/L (ref 136–145)
T4 FREE SERPL-MCNC: 1.05 NG/DL (ref 0.71–1.51)
TROPONIN I SERPL DL<=0.01 NG/ML-MCNC: 0.04 NG/ML (ref 0.02–0.04)
TROPONIN I SERPL DL<=0.01 NG/ML-MCNC: 0.04 NG/ML (ref 0.02–0.04)
TSH SERPL DL<=0.005 MIU/L-ACNC: 0.12 UIU/ML (ref 0.34–5.6)
WBC # BLD AUTO: 5.47 K/UL (ref 3.9–12.7)

## 2019-08-20 PROCEDURE — 80053 COMPREHEN METABOLIC PANEL: CPT

## 2019-08-20 PROCEDURE — 25000003 PHARM REV CODE 250: Performed by: HOSPITALIST

## 2019-08-20 PROCEDURE — 83735 ASSAY OF MAGNESIUM: CPT

## 2019-08-20 PROCEDURE — G0378 HOSPITAL OBSERVATION PER HR: HCPCS

## 2019-08-20 PROCEDURE — 99285 EMERGENCY DEPT VISIT HI MDM: CPT | Mod: 25

## 2019-08-20 PROCEDURE — 84443 ASSAY THYROID STIM HORMONE: CPT

## 2019-08-20 PROCEDURE — 63600175 PHARM REV CODE 636 W HCPCS: Performed by: HOSPITALIST

## 2019-08-20 PROCEDURE — 84439 ASSAY OF FREE THYROXINE: CPT

## 2019-08-20 PROCEDURE — 84484 ASSAY OF TROPONIN QUANT: CPT | Mod: 91

## 2019-08-20 PROCEDURE — 36415 COLL VENOUS BLD VENIPUNCTURE: CPT

## 2019-08-20 PROCEDURE — 93005 ELECTROCARDIOGRAM TRACING: CPT

## 2019-08-20 PROCEDURE — 85025 COMPLETE CBC W/AUTO DIFF WBC: CPT

## 2019-08-20 PROCEDURE — 84484 ASSAY OF TROPONIN QUANT: CPT

## 2019-08-20 PROCEDURE — 96374 THER/PROPH/DIAG INJ IV PUSH: CPT

## 2019-08-20 RX ORDER — POTASSIUM CHLORIDE 20 MEQ/15ML
40 SOLUTION ORAL
Status: DISCONTINUED | OUTPATIENT
Start: 2019-08-20 | End: 2019-08-22 | Stop reason: HOSPADM

## 2019-08-20 RX ORDER — ACETAMINOPHEN 500 MG
500 TABLET ORAL NIGHTLY
Status: ON HOLD | COMMUNITY
End: 2021-01-01 | Stop reason: HOSPADM

## 2019-08-20 RX ORDER — ACETAMINOPHEN 325 MG/1
650 TABLET ORAL EVERY 4 HOURS PRN
Status: DISCONTINUED | OUTPATIENT
Start: 2019-08-20 | End: 2019-08-22 | Stop reason: HOSPADM

## 2019-08-20 RX ORDER — ASPIRIN 81 MG/1
81 TABLET ORAL DAILY
Status: DISCONTINUED | OUTPATIENT
Start: 2019-08-21 | End: 2019-08-22 | Stop reason: HOSPADM

## 2019-08-20 RX ORDER — LANOLIN ALCOHOL/MO/W.PET/CERES
800 CREAM (GRAM) TOPICAL
Status: DISCONTINUED | OUTPATIENT
Start: 2019-08-20 | End: 2019-08-22 | Stop reason: HOSPADM

## 2019-08-20 RX ORDER — TIMOLOL MALEATE 5 MG/ML
1 SOLUTION OPHTHALMIC DAILY
Status: DISCONTINUED | OUTPATIENT
Start: 2019-08-21 | End: 2019-08-21

## 2019-08-20 RX ORDER — SODIUM CHLORIDE 0.9 % (FLUSH) 0.9 %
10 SYRINGE (ML) INJECTION
Status: DISCONTINUED | OUTPATIENT
Start: 2019-08-20 | End: 2019-08-22 | Stop reason: HOSPADM

## 2019-08-20 RX ORDER — ACETAMINOPHEN 325 MG/1
650 TABLET ORAL EVERY 8 HOURS PRN
Status: DISCONTINUED | OUTPATIENT
Start: 2019-08-20 | End: 2019-08-22 | Stop reason: HOSPADM

## 2019-08-20 RX ORDER — HYDRALAZINE HYDROCHLORIDE 20 MG/ML
10 INJECTION INTRAMUSCULAR; INTRAVENOUS EVERY 6 HOURS PRN
Status: DISCONTINUED | OUTPATIENT
Start: 2019-08-20 | End: 2019-08-22 | Stop reason: HOSPADM

## 2019-08-20 RX ORDER — ENOXAPARIN SODIUM 100 MG/ML
1 INJECTION SUBCUTANEOUS
Status: DISCONTINUED | OUTPATIENT
Start: 2019-08-20 | End: 2019-08-22 | Stop reason: HOSPADM

## 2019-08-20 RX ORDER — LATANOPROST 50 UG/ML
1 SOLUTION/ DROPS OPHTHALMIC NIGHTLY
Status: DISCONTINUED | OUTPATIENT
Start: 2019-08-20 | End: 2019-08-22 | Stop reason: HOSPADM

## 2019-08-20 RX ORDER — NAPROXEN SODIUM 220 MG/1
324 TABLET, FILM COATED ORAL
Status: COMPLETED | OUTPATIENT
Start: 2019-08-20 | End: 2019-08-20

## 2019-08-20 RX ORDER — ONDANSETRON 2 MG/ML
4 INJECTION INTRAMUSCULAR; INTRAVENOUS EVERY 6 HOURS PRN
Status: DISCONTINUED | OUTPATIENT
Start: 2019-08-20 | End: 2019-08-22 | Stop reason: HOSPADM

## 2019-08-20 RX ADMIN — HYDRALAZINE HYDROCHLORIDE 10 MG: 20 INJECTION INTRAMUSCULAR; INTRAVENOUS at 08:08

## 2019-08-20 RX ADMIN — LATANOPROST 1 DROP: 50 SOLUTION OPHTHALMIC at 08:08

## 2019-08-20 RX ADMIN — ASPIRIN 81 MG 324 MG: 81 TABLET ORAL at 03:08

## 2019-08-20 NOTE — ED TRIAGE NOTES
Per EMS, pt c/o left shoulder pain intermittently for 3-4 days with no known injury. AA and at baseline mentation. No obvious injury to shoulder. Pos+ radial pulse.

## 2019-08-20 NOTE — SUBJECTIVE & OBJECTIVE
Past Medical History:   Diagnosis Date    Anemia in chronic kidney disease(285.21) 8/10/2017    Anemia of other chronic disease 8/10/2017    Atrial fibrillation     Glaucoma     Heart failure, right-sided     Hyperlipemia     Hypertension     Monoclonal paraproteinemia 8/10/2017    Pulmonary hypertension     Secondary hyperparathyroidism        Past Surgical History:   Procedure Laterality Date    GALLBLADDER SURGERY         Review of patient's allergies indicates:   Allergen Reactions    Clindamycin     Hydroco     Pcn [penicillins]        No current facility-administered medications on file prior to encounter.      Current Outpatient Medications on File Prior to Encounter   Medication Sig    acetaminophen (TYLENOL) 500 MG tablet Take 500 mg by mouth every evening.    amLODIPine (NORVASC) 5 MG tablet Take 5 mg by mouth once daily.    apixaban (ELIQUIS) 2.5 mg Tab Take 2.5 mg by mouth 2 (two) times daily.    aspirin (ECOTRIN) 81 MG EC tablet Take 81 mg by mouth once daily.    calcium carbonate (OS-CHELITA) 600 mg calcium (1,500 mg) Tab Take 600 mg by mouth once.    gabapentin (NEURONTIN) 100 MG capsule 100 mg every evening.     hyaluronate sodium (HYALURONIC ACID, SODIUM, ORAL) Take 140 mg by mouth once daily.    krill/om-3/dha/epa/phospho/ast (MEGARED OMEGA-3 KRILL OIL ORAL) Take 500 mg by mouth once daily.    latanoprost 0.005 % ophthalmic solution Place into both eyes every evening.     loratadine (CLARITIN) 10 mg tablet Take 10 mg by mouth once daily.    lovastatin (MEVACOR) 20 MG tablet 20 mg every evening.     multivitamin capsule Take 1 capsule by mouth once daily.    psyllium 0.52 gram capsule Take 1.56 g by mouth once daily.     timolol maleate 0.5% (TIMOPTIC-XE) 0.5 % SolG 1 drop once daily.     trospium (SANCTURA) 20 mg Tab tablet 20 mg every evening.     calcitRIOL (ROCALTROL) 0.25 MCG Cap     cyanocobalamin 500 MCG tablet Take 500 mcg by mouth once daily.    fluticasone  (FLONASE) 50 mcg/actuation nasal spray     furosemide (LASIX) 20 MG tablet 20 mg once daily.     ibuprofen (ADVIL,MOTRIN) 800 MG tablet     magnesium 30 mg Tab Take by mouth once.    melatonin 5 mg Tab Take by mouth.     Family History     Problem Relation (Age of Onset)    Cancer Mother, Maternal Aunt, Maternal Grandmother    Heart attack Father, Paternal Uncle    Heart disease Father, Maternal Aunt        Tobacco Use    Smoking status: Former Smoker     Last attempt to quit: 1970     Years since quittin.6    Smokeless tobacco: Never Used   Substance and Sexual Activity    Alcohol use: No    Drug use: No    Sexual activity: Not on file     Review of Systems the patient denies headache appetite change shortness of breath chest pain abdominal pain nausea vomiting bowel changes or urinary changes weakness lightheadedness dysuria diarrhea or constipation  However patient with probable dementia  Objective:     Vital Signs (Most Recent):  Temp: 97.8 °F (36.6 °C) (19 1409)  Pulse: (!) 41 (19 1409)  Resp: 18 (19 1409)  BP: (!) 178/72 (19 1409)  SpO2: 95 % (19 1409) Vital Signs (24h Range):  Temp:  [97.8 °F (36.6 °C)] 97.8 °F (36.6 °C)  Pulse:  [41] 41  Resp:  [18] 18  SpO2:  [95 %] 95 %  BP: (178)/(72) 178/72     Weight: 63.5 kg (140 lb)  Body mass index is 22.6 kg/m².    Physical Exam the patient appears younger than stated age who is lying in a gurney in no apparent distress  There is no family present  HEENT is unremarkable  Neck is supple and nontender  Lungs are clear to auscultation  Heart is irregularly irregular with a rate of approximately 40 to 50s  Abdomen is soft nontender positive bowel sounds  Extremities no edema  Neurological patient is alert she is oriented to name and hospital  And her age   exam no Constantino        Significant Labs:   BMP:   Recent Labs   Lab 19  1532         K 3.8      CO2 26   BUN 42*   CREATININE 1.2   CALCIUM 9.8   MG  2.1     CBC:   Recent Labs   Lab 08/20/19  1532   WBC 5.47   HGB 13.7   HCT 42.3        CMP:   Recent Labs   Lab 08/20/19  1532      K 3.8      CO2 26      BUN 42*   CREATININE 1.2   CALCIUM 9.8   PROT 7.3   ALBUMIN 3.7   BILITOT 0.7   ALKPHOS 93   AST 30   ALT 20   ANIONGAP 8   EGFRNONAA 39.3*     Troponin:   Recent Labs   Lab 08/20/19  1532   TROPONINI 0.045*       Significant Imaging:   Chest x-ray shows cardiomegaly no acute process read by me  ECG shows atrial fibrillation with a rate of approximately 40 to 50s personally read by me

## 2019-08-20 NOTE — HPI
Patient is a 89-year-old female who came to the emergency room for complaints of left shoulder pain. Her history is obtained from ER physician who states patient complained of left shoulder pain therefore was brought to the emergency department.  Patient denies chest pain or shortness of breath or abdominal pain. Upon my examination patient denies shoulder pain or chest pain.  She appears to have dementia.  She is by herself and there are no family members currently available.  Her ECG  shows atrial fibrillation which is chronic however her rate s is in the 30s.at  times  Blood pressure is stable.  Otherwise history of present illness is unknown

## 2019-08-20 NOTE — NURSING
PT ARRIVED FROM ER IN STABLE CONDITION WITH SON AT BEDSIDE.  GALLO VANCE PROVIDED BEDSIDE REPORT.  ORIENTED TO STAFF, UNIT, AND POC.  QUESTIONS ANSWERED.  DENIES NEEDS.  CALL LIGHT IN REACH.  BED ALARM ACTIVE.  WILL CONT TO MONITOR.

## 2019-08-20 NOTE — ASSESSMENT & PLAN NOTE
Patient with a history of chronic atrial fibrillation now with slow ventricular rate  Will consult Cardiology  Check TSH  Will hold calcium channel blocker  Hold Eliquis and anticoagulate with Lovenox until determined if patient will need pacemaker

## 2019-08-20 NOTE — H&P
[facility]  History & Physical    Patient Name: Maru Mansfield  MRN: [id number]  Admission Date: 8/20/2019  Attending Physician: Gavin Asher DO   Primary Care Provider: Kannan Acevedo MD         Patient information was obtained from patient and ER records.     Subjective:     Principal Problem:Bradycardia    Chief Complaint:   Chief Complaint   Patient presents with    Shoulder Pain     today        HPI: Patient is a 89-year-old female who came to the emergency room for complaints of left shoulder pain. Her history is obtained from ER physician who states patient complained of left shoulder pain therefore was brought to the emergency department.  Patient denies chest pain or shortness of breath or abdominal pain. Upon my examination patient denies shoulder pain or chest pain.  She appears to have dementia.  She is by herself and there are no family members currently available.  Her ECG  shows atrial fibrillation which is chronic however her rate s is in the 30s.at  times  Blood pressure is stable.  Otherwise history of present illness is unknown    Past Medical History:   Diagnosis Date    Anemia in chronic kidney disease(285.21) 8/10/2017    Anemia of other chronic disease 8/10/2017    Atrial fibrillation     Glaucoma     Heart failure, right-sided     Hyperlipemia     Hypertension     Monoclonal paraproteinemia 8/10/2017    Pulmonary hypertension     Secondary hyperparathyroidism        Past Surgical History:   Procedure Laterality Date    GALLBLADDER SURGERY         Review of patient's allergies indicates:   Allergen Reactions    Clindamycin     Hydroco     Pcn [penicillins]        No current facility-administered medications on file prior to encounter.      Current Outpatient Medications on File Prior to Encounter   Medication Sig    acetaminophen (TYLENOL) 500 MG tablet Take 500 mg by mouth every evening.    amLODIPine (NORVASC) 5 MG tablet Take 5 mg by  mouth once daily.    apixaban (ELIQUIS) 2.5 mg Tab Take 2.5 mg by mouth 2 (two) times daily.    aspirin (ECOTRIN) 81 MG EC tablet Take 81 mg by mouth once daily.    calcium carbonate (OS-CHELITA) 600 mg calcium (1,500 mg) Tab Take 600 mg by mouth once.    gabapentin (NEURONTIN) 100 MG capsule 100 mg every evening.     hyaluronate sodium (HYALURONIC ACID, SODIUM, ORAL) Take 140 mg by mouth once daily.    krill/om-3/dha/epa/phospho/ast (MEGARED OMEGA-3 KRILL OIL ORAL) Take 500 mg by mouth once daily.    latanoprost 0.005 % ophthalmic solution Place into both eyes every evening.     loratadine (CLARITIN) 10 mg tablet Take 10 mg by mouth once daily.    lovastatin (MEVACOR) 20 MG tablet 20 mg every evening.     multivitamin capsule Take 1 capsule by mouth once daily.    psyllium 0.52 gram capsule Take 1.56 g by mouth once daily.     timolol maleate 0.5% (TIMOPTIC-XE) 0.5 % SolG 1 drop once daily.     trospium (SANCTURA) 20 mg Tab tablet 20 mg every evening.     calcitRIOL (ROCALTROL) 0.25 MCG Cap     cyanocobalamin 500 MCG tablet Take 500 mcg by mouth once daily.    fluticasone (FLONASE) 50 mcg/actuation nasal spray     furosemide (LASIX) 20 MG tablet 20 mg once daily.     ibuprofen (ADVIL,MOTRIN) 800 MG tablet     magnesium 30 mg Tab Take by mouth once.    melatonin 5 mg Tab Take by mouth.     Family History     Problem Relation (Age of Onset)    Cancer Mother, Maternal Aunt, Maternal Grandmother    Heart attack Father, Paternal Uncle    Heart disease Father, Maternal Aunt        Tobacco Use    Smoking status: Former Smoker     Last attempt to quit: 1970     Years since quittin.6    Smokeless tobacco: Never Used   Substance and Sexual Activity    Alcohol use: No    Drug use: No    Sexual activity: Not on file     Review of Systems the patient denies headache appetite change shortness of breath chest pain abdominal pain nausea vomiting bowel changes or urinary changes weakness lightheadedness  dysuria diarrhea or constipation  However patient with probable dementia  Objective:     Vital Signs (Most Recent):  Temp: 97.8 °F (36.6 °C) (08/20/19 1409)  Pulse: (!) 41 (08/20/19 1409)  Resp: 18 (08/20/19 1409)  BP: (!) 178/72 (08/20/19 1409)  SpO2: 95 % (08/20/19 1409) Vital Signs (24h Range):  Temp:  [97.8 °F (36.6 °C)] 97.8 °F (36.6 °C)  Pulse:  [41] 41  Resp:  [18] 18  SpO2:  [95 %] 95 %  BP: (178)/(72) 178/72     Weight: 63.5 kg (140 lb)  Body mass index is 22.6 kg/m².    Physical Exam the patient appears younger than stated age who is lying in a gurney in no apparent distress  There is no family present  HEENT is unremarkable  Neck is supple and nontender  Lungs are clear to auscultation  Heart is irregularly irregular with a rate of approximately 40 to 50s  Abdomen is soft nontender positive bowel sounds  Extremities no edema  Neurological patient is alert she is oriented to name and hospital  And her age   exam no Constantino        Significant Labs:   BMP:   Recent Labs   Lab 08/20/19  1532         K 3.8      CO2 26   BUN 42*   CREATININE 1.2   CALCIUM 9.8   MG 2.1     CBC:   Recent Labs   Lab 08/20/19  1532   WBC 5.47   HGB 13.7   HCT 42.3        CMP:   Recent Labs   Lab 08/20/19  1532      K 3.8      CO2 26      BUN 42*   CREATININE 1.2   CALCIUM 9.8   PROT 7.3   ALBUMIN 3.7   BILITOT 0.7   ALKPHOS 93   AST 30   ALT 20   ANIONGAP 8   EGFRNONAA 39.3*     Troponin:   Recent Labs   Lab 08/20/19  1532   TROPONINI 0.045*       Significant Imaging:   Chest x-ray shows cardiomegaly no acute process read by me  ECG shows atrial fibrillation with a rate of approximately 40 to 50s personally read by me    Assessment/Plan:     * Bradycardia  Patient with intermittent episodes of bradycardia with atrial fibrillation  Will hold calcium channel blocker      Atrial fibrillation with slow ventricular response  Patient with a history of chronic atrial fibrillation now with slow  ventricular rate  Will consult Cardiology  Check TSH  Will hold calcium channel blocker  Hold Eliquis and anticoagulate with Lovenox until determined if patient will need pacemaker    Monoclonal paraproteinemia  Details unknown   patient has seen Dr. Au  in the past      Elevated troponin  Will follow  serial labs      Dementia  Possible dementia      VTE Risk Mitigation (From admission, onward)        Ordered     enoxaparin injection 60 mg  Every 24 hours (non-standard times)      08/20/19 3336             Gavin Asher DO  Department of Hospital Medicine   [facility]

## 2019-08-20 NOTE — ASSESSMENT & PLAN NOTE
Patient with intermittent episodes of bradycardia with atrial fibrillation  Will hold calcium channel blocker

## 2019-08-20 NOTE — ED PROVIDER NOTES
Encounter Date: 2019       History     Chief Complaint   Patient presents with    Shoulder Pain     Patient does have some history of bradycardia in the past.  This seemed asymptomatic.  Last 2 or 3 days patient has been having anterior upper chest pain.  Feels like it radiates to left arm.  No trauma. No pain with movement.  No change in shortness of breath.  No syncope.  Patient was noted to be profoundly bradycardic in triage.  Patient is not currently on any chronotropic medication.  Movement does not exacerbate chest and shoulder pain.  There is no pleurisy hemoptysis.  Currently pain is mild.        Review of patient's allergies indicates:   Allergen Reactions    Clindamycin     Hydroco     Pcn [penicillins]      Past Medical History:   Diagnosis Date    Anemia in chronic kidney disease(285.21) 8/10/2017    Anemia of other chronic disease 8/10/2017    Atrial fibrillation     Glaucoma     Heart failure, right-sided     Hyperlipemia     Hypertension     Monoclonal paraproteinemia 8/10/2017    Pulmonary hypertension     Secondary hyperparathyroidism      Past Surgical History:   Procedure Laterality Date    GALLBLADDER SURGERY       Family History   Problem Relation Age of Onset    Cancer Mother     Heart attack Father     Heart disease Father     Heart disease Maternal Aunt     Cancer Maternal Aunt     Heart attack Paternal Uncle     Cancer Maternal Grandmother      Social History     Tobacco Use    Smoking status: Former Smoker     Last attempt to quit: 1970     Years since quittin.6    Smokeless tobacco: Never Used   Substance Use Topics    Alcohol use: No    Drug use: No     Review of Systems   Constitutional: Negative for chills and fever.   HENT: Negative for congestion.    Eyes: Negative for visual disturbance.   Respiratory: Negative for shortness of breath.    Cardiovascular: Positive for chest pain. Negative for palpitations.   Gastrointestinal: Negative for abdominal  pain and vomiting.   Genitourinary: Negative for dysuria.   Musculoskeletal: Negative for joint swelling.   Neurological: Negative for headaches.   Psychiatric/Behavioral: Negative for confusion.       Physical Exam     Initial Vitals [08/20/19 1409]   BP Pulse Resp Temp SpO2   (!) 178/72 (!) 41 18 97.8 °F (36.6 °C) 95 %      MAP       --         Physical Exam    Nursing note and vitals reviewed.  Constitutional: She is not diaphoretic. No distress.   HENT:   Head: Normocephalic and atraumatic.   Eyes: Conjunctivae are normal.   Neck: Normal range of motion.   Cardiovascular: Normal rate.   Pulmonary/Chest: Breath sounds normal.   Abdominal: Soft. There is no tenderness.   Musculoskeletal: Normal range of motion.   Left arm is neurovascular intact with 2+ radial pulse.  No arm swelling. Full range of motion at fingers wrist elbow and shoulder.  No reproducible shoulder pain with movement of left shoulder.   Neurological: She is alert.   No gross deficits   Skin: No rash noted.   Psychiatric: She has a normal mood and affect.         ED Course   Procedures  Labs Reviewed   MAGNESIUM   CBC W/ AUTO DIFFERENTIAL   COMPREHENSIVE METABOLIC PANEL   TROPONIN I        ECG Results          EKG 12-lead (In process)  Result time 08/20/19 14:42:25    In process by Interface, Lab In [facility] (08/20/19 14:42:25)                 Narrative:    Test Reason : M25.519,    Vent. Rate : 045 BPM     Atrial Rate : 066 BPM     P-R Int : 000 ms          QRS Dur : 106 ms      QT Int : 524 ms       P-R-T Axes : 000 -87 076 degrees     QTc Int : 453 ms    Atrial fibrillation with slow ventricular response  Left axis deviation  Inferior infarct (cited on or before 02-AUG-2018)  Anterolateral infarct (cited on or before 02-AUG-2018)  Abnormal ECG  When compared with ECG of 02-AUG-2018 12:52,  Atrial fibrillation has replaced Sinus rhythm  Left anterior fascicular block is no longer Present  Questionable change in initial forces of Inferior  leads    Referred By: [email or web address]   SELF           Confirmed By:                             Imaging Results    None          Medical Decision Making:   History:   Old Medical Records: I decided to obtain old medical records.  Clinical Tests:   Lab Tests: Reviewed  Radiological Study: Reviewed  Medical Tests: Reviewed  ED Management:  Patient presents with upper chest pain of unclear etiology.  Patient has had some mild bradycardia in the past.  Here patient has noted to be bradycardic with a rate down to 30.  Appears to be atrial fibrillation with slow ventricular response.  Currently patient is on no chronotropic medication.  Troponin is currently indeterminate.  Aspirin given.  Patient will be admitted for symptomatic bradycardia and to rule out acute coronary syndrome.                      Clinical Impression:       ICD-10-CM ICD-9-CM   1. Shoulder pain M25.519 719.41   2. Chest pain R07.9 786.50   3. Bradycardia R00.1 427.89                                Garcia Meyers MD  08/20/19 8950

## 2019-08-20 NOTE — PROGRESS NOTES
Pharmacist Renal Dose Adjustment Note    Maru Mansfield is a 89 y.o. female being treated with the medication Enoxaparin    Patient Data:    Vital Signs (Most Recent):  Temp: 97.8 °F (36.6 °C) (08/20/19 1409)  Pulse: (!) 41 (08/20/19 1409)  Resp: 18 (08/20/19 1409)  BP: (!) 178/72 (08/20/19 1409)  SpO2: 95 % (08/20/19 1409)   Vital Signs (72h Range):  Temp:  [97.8 °F (36.6 °C)]   Pulse:  [41]   Resp:  [18]   BP: (178)/(72)   SpO2:  [95 %]      Recent Labs   Lab 08/20/19  1532   CREATININE 1.2     Serum creatinine: 1.2 mg/dL 08/20/19 1532  Estimated creatinine clearance: 28 mL/min    Medication: Enoxaparin 60mg (1mg/kg) SQ Q12h will be changed to enoxaparin 60mg (1mg/kg) SQ Daily    Pharmacist's Name: Joseph Paul  Pharmacist's Extension: [phone]

## 2019-08-21 LAB
ANION GAP SERPL CALC-SCNC: 6 MMOL/L (ref 8–16)
BACTERIA #/AREA URNS HPF: NEGATIVE /HPF
BASOPHILS # BLD AUTO: 0.04 K/UL (ref 0–0.2)
BASOPHILS NFR BLD: 0.6 % (ref 0–1.9)
BILIRUB UR QL STRIP: NEGATIVE
BUN SERPL-MCNC: 38 MG/DL (ref 10–30)
CALCIUM SERPL-MCNC: 9.7 MG/DL (ref 8.7–10.5)
CHLORIDE SERPL-SCNC: 106 MMOL/L (ref 95–110)
CLARITY UR: CLEAR
CO2 SERPL-SCNC: 25 MMOL/L (ref 23–29)
COLOR UR: YELLOW
CREAT SERPL-MCNC: 1 MG/DL (ref 0.5–1.4)
DIFFERENTIAL METHOD: ABNORMAL
EOSINOPHIL # BLD AUTO: 0.5 K/UL (ref 0–0.5)
EOSINOPHIL NFR BLD: 7.1 % (ref 0–8)
ERYTHROCYTE [DISTWIDTH] IN BLOOD BY AUTOMATED COUNT: 14.1 % (ref 11.5–14.5)
EST. GFR  (AFRICAN AMERICAN): 56.5 ML/MIN/1.73 M^2
EST. GFR  (NON AFRICAN AMERICAN): 49 ML/MIN/1.73 M^2
GLUCOSE SERPL-MCNC: 124 MG/DL (ref 70–110)
GLUCOSE UR QL STRIP: NEGATIVE
HCT VFR BLD AUTO: 39 % (ref 37–48.5)
HGB BLD-MCNC: 12.9 G/DL (ref 12–16)
HGB UR QL STRIP: NEGATIVE
HYALINE CASTS #/AREA URNS LPF: 5 /LPF
IMM GRANULOCYTES # BLD AUTO: 0.02 K/UL (ref 0–0.04)
IMM GRANULOCYTES NFR BLD AUTO: 0.3 % (ref 0–0.5)
KETONES UR QL STRIP: NEGATIVE
LEUKOCYTE ESTERASE UR QL STRIP: ABNORMAL
LYMPHOCYTES # BLD AUTO: 1.2 K/UL (ref 1–4.8)
LYMPHOCYTES NFR BLD: 16.2 % (ref 18–48)
MCH RBC QN AUTO: 30.8 PG (ref 27–31)
MCHC RBC AUTO-ENTMCNC: 33.1 G/DL (ref 32–36)
MCV RBC AUTO: 93 FL (ref 82–98)
MICROSCOPIC COMMENT: ABNORMAL
MONOCYTES # BLD AUTO: 0.6 K/UL (ref 0.3–1)
MONOCYTES NFR BLD: 8.4 % (ref 4–15)
NEUTROPHILS # BLD AUTO: 4.8 K/UL (ref 1.8–7.7)
NEUTROPHILS NFR BLD: 67.4 % (ref 38–73)
NITRITE UR QL STRIP: NEGATIVE
NRBC BLD-RTO: 0 /100 WBC
PH UR STRIP: 6 [PH] (ref 5–8)
PLATELET # BLD AUTO: 183 K/UL (ref 150–350)
PMV BLD AUTO: 11.1 FL (ref 9.2–12.9)
POTASSIUM SERPL-SCNC: 3.6 MMOL/L (ref 3.5–5.1)
PROT UR QL STRIP: ABNORMAL
RBC # BLD AUTO: 4.19 M/UL (ref 4–5.4)
RBC #/AREA URNS HPF: 4 /HPF (ref 0–4)
SODIUM SERPL-SCNC: 137 MMOL/L (ref 136–145)
SP GR UR STRIP: 1.01 (ref 1–1.03)
SQUAMOUS #/AREA URNS HPF: 3 /HPF
TROPONIN I SERPL DL<=0.01 NG/ML-MCNC: 0.05 NG/ML (ref 0.02–0.04)
URN SPEC COLLECT METH UR: ABNORMAL
UROBILINOGEN UR STRIP-ACNC: NEGATIVE EU/DL
WBC # BLD AUTO: 7.14 K/UL (ref 3.9–12.7)
WBC #/AREA URNS HPF: 7 /HPF (ref 0–5)

## 2019-08-21 PROCEDURE — 81001 URINALYSIS AUTO W/SCOPE: CPT

## 2019-08-21 PROCEDURE — 36415 COLL VENOUS BLD VENIPUNCTURE: CPT

## 2019-08-21 PROCEDURE — 80048 BASIC METABOLIC PNL TOTAL CA: CPT

## 2019-08-21 PROCEDURE — 85025 COMPLETE CBC W/AUTO DIFF WBC: CPT

## 2019-08-21 PROCEDURE — 94761 N-INVAS EAR/PLS OXIMETRY MLT: CPT

## 2019-08-21 PROCEDURE — 25000003 PHARM REV CODE 250: Performed by: HOSPITALIST

## 2019-08-21 PROCEDURE — 63600175 PHARM REV CODE 636 W HCPCS: Performed by: HOSPITALIST

## 2019-08-21 PROCEDURE — 96375 TX/PRO/DX INJ NEW DRUG ADDON: CPT

## 2019-08-21 PROCEDURE — 25000003 PHARM REV CODE 250: Performed by: INTERNAL MEDICINE

## 2019-08-21 PROCEDURE — G0378 HOSPITAL OBSERVATION PER HR: HCPCS

## 2019-08-21 RX ORDER — ATROPINE SULFATE 0.1 MG/ML
INJECTION INTRAVENOUS
Status: DISPENSED
Start: 2019-08-21 | End: 2019-08-21

## 2019-08-21 RX ORDER — HALOPERIDOL 5 MG/1
5 TABLET ORAL EVERY 8 HOURS PRN
Status: DISCONTINUED | OUTPATIENT
Start: 2019-08-21 | End: 2019-08-21

## 2019-08-21 RX ORDER — HALOPERIDOL 5 MG/ML
2 INJECTION INTRAMUSCULAR EVERY 6 HOURS PRN
Status: DISCONTINUED | OUTPATIENT
Start: 2019-08-21 | End: 2019-08-22 | Stop reason: HOSPADM

## 2019-08-21 RX ADMIN — ASPIRIN 81 MG: 81 TABLET, COATED ORAL at 09:08

## 2019-08-21 RX ADMIN — Medication: at 12:08

## 2019-08-21 RX ADMIN — LATANOPROST 1 DROP: 50 SOLUTION OPHTHALMIC at 08:08

## 2019-08-21 RX ADMIN — TIMOLOL MALEATE 1 DROP: 5 SOLUTION OPHTHALMIC at 09:08

## 2019-08-21 RX ADMIN — ENOXAPARIN SODIUM 60 MG: 100 INJECTION SUBCUTANEOUS at 05:08

## 2019-08-21 NOTE — SUBJECTIVE & OBJECTIVE
Interval History:    Review of Systems   Constitutional: Negative for chills and fever.   HENT: Negative for congestion.    Respiratory: Negative for shortness of breath and wheezing.    Cardiovascular: Negative for chest pain and palpitations.   Gastrointestinal: Negative for constipation, diarrhea, nausea and vomiting.   Genitourinary: Negative for dysuria, flank pain, urgency and vaginal discharge.   Musculoskeletal: Negative for back pain and myalgias.     Objective:     Vital Signs (Most Recent):  Temp: 97.6 °F (36.4 °C) (08/21/19 0747)  Pulse: (!) 53 (08/21/19 0747)  Resp: 19 (08/21/19 0747)  BP: (!) 171/67 (08/21/19 0747)  SpO2: (!) 94 % (08/21/19 0747) Vital Signs (24h Range):  Temp:  [97.6 °F (36.4 °C)-98.2 °F (36.8 °C)] 97.6 °F (36.4 °C)  Pulse:  [41-59] 53  Resp:  [18-57] 19  SpO2:  [90 %-96 %] 94 %  BP: (143-189)/() 171/67     Weight: 63.5 kg (140 lb)  Body mass index is 22.6 kg/m².  No intake or output data in the 24 hours ending 08/21/19 1056   Physical Exam   Constitutional: She is oriented to person, place, and time. No distress.   Eyes: Pupils are equal, round, and reactive to light. No scleral icterus.   Cardiovascular: Normal rate and regular rhythm.   No murmur heard.  Pulmonary/Chest: Breath sounds normal. She has no wheezes. She has no rales.   Abdominal: Soft. She exhibits no distension. There is no tenderness.   Neurological: She is alert and oriented to person, place, and time.   Skin: Skin is warm. Capillary refill takes less than 2 seconds. No rash noted.   Psychiatric: She has a normal mood and affect.   Nursing note and vitals reviewed.      Significant Labs:   BMP:   Recent Labs   Lab 08/20/19  1532 08/21/19  0323    124*    137   K 3.8 3.6    106   CO2 26 25   BUN 42* 38*   CREATININE 1.2 1.0   CALCIUM 9.8 9.7   MG 2.1  --      CBC:   Recent Labs   Lab 08/20/19  1532 08/21/19  0323   WBC 5.47 7.14   HGB 13.7 12.9   HCT 42.3 39.0    183     Cardiac  Markers: No results for input(s): CKMB, MYOGLOBIN, BNP, TROPISTAT in the last 48 hours.    Significant Imaging: I have reviewed all pertinent imaging results/findings within the past 24 hours.

## 2019-08-21 NOTE — PLAN OF CARE
Problem: Oral Intake Inadequate  Goal: Improved Oral Intake  Outcome: Ongoing (interventions implemented as appropriate)  Decreased intake with some meals   Intervention: Promote and Optimize Oral Intake  Supplement with Ensure Enlive Chocolate TID

## 2019-08-21 NOTE — ASSESSMENT & PLAN NOTE
Telemetry strip reviewed, she is in Atrial fibrillation, but she has intermittent episodes of bradycardia, sometimes into the 20s.  It does not appear that she is symptomatic from it.  Spoke with son at bedside and he says he has had two cardiologists (dr. Ferreira and Dr. Floyd) say she does not need a pacemaker  Awaiting cardiology consult with Dr. Floyd.

## 2019-08-21 NOTE — PROGRESS NOTES
[facility] Medicine  Progress Note    Patient Name: Maru Mansfield  MRN: [id number]  Patient Class: OP- Observation   Admission Date: 8/20/2019  Length of Stay: 0 days  Attending Physician: Isa Gonzalez MD  Primary Care Provider: Kannan Acevedo MD        Subjective:     Principal Problem:Bradycardia        HPI:  Patient is a 89-year-old female who came to the emergency room for complaints of left shoulder pain. Her history is obtained from ER physician who states patient complained of left shoulder pain therefore was brought to the emergency department.  Patient denies chest pain or shortness of breath or abdominal pain. Upon my examination patient denies shoulder pain or chest pain.  She appears to have dementia.  She is by herself and there are no family members currently available.  Her ECG  shows atrial fibrillation which is chronic however her rate s is in the 30s.at  times  Blood pressure is stable.  Otherwise history of present illness is unknown    Overview/Hospital Course:  No notes on file    Interval History:    Review of Systems   Constitutional: Negative for chills and fever.   HENT: Negative for congestion.    Respiratory: Negative for shortness of breath and wheezing.    Cardiovascular: Negative for chest pain and palpitations.   Gastrointestinal: Negative for constipation, diarrhea, nausea and vomiting.   Genitourinary: Negative for dysuria, flank pain, urgency and vaginal discharge.   Musculoskeletal: Negative for back pain and myalgias.     Objective:     Vital Signs (Most Recent):  Temp: 97.6 °F (36.4 °C) (08/21/19 0747)  Pulse: (!) 53 (08/21/19 0747)  Resp: 19 (08/21/19 0747)  BP: (!) 171/67 (08/21/19 0747)  SpO2: (!) 94 % (08/21/19 0747) Vital Signs (24h Range):  Temp:  [97.6 °F (36.4 °C)-98.2 °F (36.8 °C)] 97.6 °F (36.4 °C)  Pulse:  [41-59] 53  Resp:  [18-57] 19  SpO2:  [90 %-96 %] 94 %  BP: (143-189)/() 171/67     Weight: 63.5 kg (140 lb)  Body mass index  is 22.6 kg/m².  No intake or output data in the 24 hours ending 08/21/19 1056   Physical Exam   Constitutional: She is oriented to person, place, and time. No distress.   Eyes: Pupils are equal, round, and reactive to light. No scleral icterus.   Cardiovascular: Normal rate and regular rhythm.   No murmur heard.  Pulmonary/Chest: Breath sounds normal. She has no wheezes. She has no rales.   Abdominal: Soft. She exhibits no distension. There is no tenderness.   Neurological: She is alert and oriented to person, place, and time.   Skin: Skin is warm. Capillary refill takes less than 2 seconds. No rash noted.   Psychiatric: She has a normal mood and affect.   Nursing note and vitals reviewed.      Significant Labs:   BMP:   Recent Labs   Lab 08/20/19  1532 08/21/19  0323    124*    137   K 3.8 3.6    106   CO2 26 25   BUN 42* 38*   CREATININE 1.2 1.0   CALCIUM 9.8 9.7   MG 2.1  --      CBC:   Recent Labs   Lab 08/20/19  1532 08/21/19  0323   WBC 5.47 7.14   HGB 13.7 12.9   HCT 42.3 39.0    183     Cardiac Markers: No results for input(s): CKMB, MYOGLOBIN, BNP, TROPISTAT in the last 48 hours.    Significant Imaging: I have reviewed all pertinent imaging results/findings within the past 24 hours.      Assessment/Plan:      * Bradycardia  Telemetry strip reviewed, she is in Atrial fibrillation, but she has intermittent episodes of bradycardia, sometimes into the 20s.  It does not appear that she is symptomatic from it.  Spoke with son at bedside and he says he has had two cardiologists (dr. Ferreira and Dr. Floyd) say she does not need a pacemaker  Awaiting cardiology consult with Dr. Floyd.       Dementia  stable      Elevated troponin        Shoulder pain  denies      Atrial fibrillation with slow ventricular response  Current rhythm in atrial fibrillation  Anticoagulation on hold     Monoclonal paraproteinemia  Details unknown   patient has seen Dr. Au  in the past        VTE Risk  Mitigation (From admission, onward)        Ordered     enoxaparin injection 60 mg  Every 24 hours (non-standard times)      08/20/19 1646     IP VTE LOW RISK PATIENT  Once      08/20/19 1757     Place MAXIMINO hose  Until discontinued      08/20/19 1757     Place sequential compression device  Until discontinued      08/20/19 1757                Isa Gonzalez MD  Department of Hospital Medicine   [facility]

## 2019-08-21 NOTE — PLAN OF CARE
Problem: Adult Inpatient Plan of Care  Goal: Plan of Care Review  Outcome: Ongoing (interventions implemented as appropriate)     08/21/19 6447   Plan of Care Review   Plan of Care Reviewed With patient;son   Progress improving       Comments: Pt continues to be bradycardic, asymptomatic, being followed by Dr. Floyd, pt remains high fall risk, son remains at bedside, bed alarm on, pt confused, wound care has been consulted

## 2019-08-21 NOTE — PROGRESS NOTES
89 yr old female  Alert oriented to person  Son at bedside. On admit stge 1 nonblanching to the sacral.      08/21/19 0943        Pressure Injury 08/21/19 0942 Sacral spine #1 Stage 1   Date First Assessed/Time First Assessed: 08/21/19 0942   Pressure Injury Present on Admission: yes  Location: Sacral spine  Wound/PI Number (optional): #1  Is this injury device related?: No  Staging: Stage 1   Staging Stage 1   Dressing Appearance Intact   Drainage Amount None   Appearance Red;Other (see comments)  (nonblanching )   Periwound Area Intact;Redness   Care Cleansed with:;Antimicrobial agent;Skin Barrier   Dressing Island/border   Recommendation: Stage 1 Sacral  Clean with chlorhexidine/ns. Pat dry. Apply Calmoseptine and cover with mepilex Daily.     08/21/19 0949        Wound 08/21/19 0947 Other (comment) lower Leg #1   Date First Assessed/Time First Assessed: 08/21/19 0947   Pre-existing: Yes  Primary Wound Type: (c) Other (comment)  Orientation: lower  Location: Leg  Wound/PI Number (optional): #1   Wound Image      Dressing Appearance Open to air;Dry   Drainage Amount None   Appearance Ecchymotic   Periwound Area Scar tissue   Care Cleansed with:;Antimicrobial agent  (son has the cream for her lower legs from home.)     Recommendation: Bilat lower leg   Clean with chlorhexidine/ns. Pat dry. Apply Hydrophor daily.      Recommendation: Pressure prevention protocol. Turn q 2 hr. Float and elevate heels off bed with Pillows. Border dressing to the sacral and buttock. Calmoseptine skin barrier to the buttocks as needed. Bilat heel pillows and Waffle air mattress as needed.

## 2019-08-21 NOTE — PLAN OF CARE
Problem: Adult Inpatient Plan of Care  Goal: Plan of Care Review     08/21/19 0316   Plan of Care Review   Plan of Care Reviewed With patient;son       Problem: Fall Injury Risk  Goal: Absence of Fall and Fall-Related Injury    Intervention: Identify and Manage Contributors to Fall Injury Risk     08/21/19 0316   Manage Acute Allergic Reaction   Medication Review/Management medications reviewed   Identify and Manage Contributors to Fall Injury Risk   Self-Care Promotio independence encouraged;BADL personal objects within reach   Bed in lowest position. Call light within reach. Bed alarm activated. Son remains at bedside. Will cont to monitor.

## 2019-08-21 NOTE — CONSULTS
This 89-year-old white lady is admitted with left shoulder pain.  The patient is a poor historian; history is obtained from the son at the bedside.  She has dementia.    The patient apparently has had left shoulder pain since 08/18/2019.  She appears fairly comfortable at rest; however with mild movement of the shoulder, the patient reports severe pain.  She apparently fell 06/20/2019 while stooping forward to  some shoes.  It does not appear she that she has fallen since.  The patient has a chronic atrial fibrillation with slow ventricular rate.  The patient had a Holter monitor with Dr. get back when heart rate was in the 50s during the day and in the 30s and 40s at night.  There have been no dizzy spells or syncope.  Pacemaker was deferred.  There is no history of angina pectoris nitroglycerin use congestive heart failure a coronary stents.  The patient is in a recliner/lift chair for 20 hr a day.  She sleeps intermittently through the day.  She apparently is a very light sleeper.  She ambulates  to the bathroom with a Rollator.  She did have mild dizziness ambulating early in the mims.  Heart rate on the monitor  in the 40s and 50s; the lowest heart rate has been 38 bpm.  Echocardiogram 02/19/2019 revealed normal LV dimensions and systolic function with EF of 52%.  Mild left ventricular hypertrophy; left atrial pressure is intermediate elevated.  Left atrial dimension is normal; left atrial maximum volume index is mildly increased at 36.4 mL/m squared.  There is mild-to-moderate mitral insufficiency; there is severe tricuspid regurgitation with estimated PA systolic pressure of 29 mmHg.  The patient has had pulmonary hypertension in the past.  The right atrium is mildly enlarged.    Past history:  There is no history of hypertension diabetes mellitus.  Anemia; monoclonal paraproteinemia.  Chronic kidney disease.  History of pneumonia.    Social history:  The patient smoked for 3 or 4 years; she stopped  smoking 50 years ago.  She does not drink any alcohol.  She lives with a very attentive son.    Family history:  Father  age 84; heart disease mother.  Mother  at 94.    Review of systems:  The patient's appetite is poor.  She has lost weight slowly.  She has mild cough with sputum expectoration.  She has intermittent minimal wheezing.  She has sinus congestion and postnasal drip; she uses saline spray.    Medication:  Estimate of and 5 mg q.p.m. amlodipine 5 mg daily Eliquis 2.5 mg b.i.d. aspirin 81 mg daily or scalp 600/1500 mg 1 daily.  Gabapentin 100 mg q.p.m..  Hyaluronic acid 140 mg daily.  Omega 3 Krill oil find mg daily.  Latanoprost 0.005% solution b.i.d..  Timolol 0.5% daily.  Loratadine 10 mg daily.  Lovastatin 20 mg q.p.m..  Multivitamin.  His saline 1.56 g daily.  Sanctura 20 mg q.p.m. for calcitriol 0.25 mg daily.  Cyanocobalamin 500 mg daily Flonase.  Lasix 20 mg daily.  ibuprofen 800 mg p.r.n..  Melatonin 5 mg p.r.n..    Physical examination:  This is an elderly white lady in no acute distress.   Blood pressure 144/91 respirations 22 per minute pulse rate 58 bpm temperature 97.9° F.  O2 sats 96%  Eyes no conjunctival pallor or scleral icterus; intra-ocular lenses.  Throat:  No masses observed.  Complete upper plate.  Neck:  Carotids equal without bruits.  There is no jugular venous distention or thyromegaly.  Hepatojugular reflex negative.  Chest:  A few rales were audible in the left lateral base.  There are no wheezes.  Heart:  S1-S2 well heard.  Irregular rhythm.  Bradycardia.  There were no murmurs gallops rubs.  Abdomen:  Soft; liver edge is not palpable.  Extremities:  There is no clubbing cyanosis or edema.    ECG reveals atrial fibrillation with slow ventricular rate in the mid 40s.  Hemoglobin 12.9 mL crit 39.0 platelet count [id number].  WBC count 7014 100 granulocytes 67% lymphocytes 16%.  Sodium 137 potassium 3.6 BUN 38 creatinine 1.0 total protein 7.3 albumin 3.7 AST ALT  within normal limits.  Troponins ranged between 0.042 and 0.047.  TSH 0.12.  Free T4 1.05.  Chest x-ray reveals borderline cardiomegaly.  There is no pulmonary edema or pleural effusion.  Shoulder x-ray reveals osteoarthritis.      Impression:  1.  Left shoulder pain-probably arthritis/periarthritis  2..  Chronic atrial fibrillation; bradycardia.  3.  Dementia  4.  Mild-to-moderate mitral insufficiency; severe tricuspid regurgitation; normal PA systolic pressure-history of pulmonary hypertension.    The patient does not appear to be symptomatic from the bradycardia.  However, she has had 1 dizzy spell while ambulating earlier today.  She will require 24 hr Holter monitor or even a 30 day event monitor at discharge for further evaluation of the arrhythmia.  suspected left shoulder pain is secondary to arthritis-cardiac etiology extremely unlikely.  The patient is on timolol drops for glaucoma; timolol was discontinued and an alternative medication for glaucoma obtained from the ophthalmologist.

## 2019-08-21 NOTE — CONSULTS
"  [facility]  Adult Nutrition  Consult Note    SUMMARY     Recommendations    Recommendation/Intervention: 1. Continue current diet as tolerated, encourgae intake 2. Added Ensure Enlive TID  to aid in PO intake   Goals: 1. pt will consume >50% of estimated caloric and energy needs   Nutrition Goal Status: new    Reason for Assessment    Reason For Assessment: consult  Relevant Medical History: Anemia, Heart Failure, HLD, HTN   Interdisciplinary Rounds: attended    Nutrition Risk Screen    Nutrition Risk Screen: no indicators present    Nutrition/Diet History    Patient Reported Diet/Restrictions/Preferences: heart healthy  Spiritual, Cultural Beliefs, Roman Catholic Practices, Values that Affect Care: no  Food Allergies: NKFA    Anthropometrics    Temp: 97.6 °F (36.4 °C)  Height Method: Stated  Height: 5' 6" (167.6 cm)  Height (inches): 66 in  Weight Method: Standard Scale  Weight: 63.5 kg (140 lb)  Weight (lb): 140 lb  Ideal Body Weight (IBW), Female: 130 lb  % Ideal Body Weight, Female (lb): 107.69 lb  BMI (Calculated): 22.6  BMI Grade: 18.5-24.9 - normal       Lab/Procedures/Meds    Pertinent Labs Reviewed: reviewed   8/21/2019 03:23   Sodium 137   Potassium 3.6   Chloride 106   CO2 25   Anion Gap 6 (L)   BUN, Bld 38 (H)   Creatinine 1.0   eGFR if non  49.0 (A)   eGFR if African American 56.5 (A)   Glucose 124 (H)   Calcium 9.7     Pertinent Medications Reviewed: reviewed  Scheduled Meds:   aspirin  81 mg Oral Daily    atropine        enoxparin  1 mg/kg Subcutaneous Q24H    latanoprost  1 drop Both Eyes QHS    timolol maleate 0.5%  1 drop Both Eyes Daily    white petrolatum   Topical (Top) Daily     Estimated/Assessed Needs    Weight Used For Calorie Calculations: 63.5 kg (140 lb)  Energy Calorie Requirements (kcal): 5233-2841 kcal/day     Protein Requirements: 70-95 g/day   Weight Used For Protein Calculations: 63.5 kg (140 lb)     Estimated Fluid Requirement Method: RDA " Method  RDA Method (mL): 1270     Nutrition Prescription Ordered    Current Diet Order: Cardiac Diet     Evaluation of Received Nutrient/Fluid Intake    Energy Calories Required: not meeting needs  Protein Required: not meeting needs  Fluid Required: not meeting needs  Tolerance: tolerating  % Intake of Estimated Energy Needs: 25 - 50 %  % Meal Intake: 25 - 50 %    Nutrition Risk    Level of Risk/Frequency of Follow-up: moderate - high     Assessment and Plan    Pt's son at bedside, does most of the talking. Pt has dementia and not the best historian. Pt has good appetite depending on meals and certain days. RD added ensure chocolate TID to aid in intake on days where food preferences aren't met. No issues with N/V/D. No issues with weight loss. RD to continue to monitor intake and labs.    Son gave full diet recall.  B: Sauteed spinach and cheese omelet, yogurt with ovalteen and whey, mint extract, protein shake.  L:Ham with vin nectar or pecan praline sauce with some type of veggies   D: soups, chicken and dumplings, lentils with pork sauge and a veggie.     Monitor and Evaluation    Food and Nutrient Intake: energy intake  Food and Nutrient Adminstration: diet order  Physical Activity and Function: nutrition-related ADLs and IADLs  Anthropometric Measurements: weight, weight change  Biochemical Data, Medical Tests and Procedures: electrolyte and renal panel, inflammatory profile, gastrointestinal profile, lipid profile, glucose/endocrine profile  Nutrition-Focused Physical Findings: overall appearance     Nutrition Follow-Up    RD Follow-up?: Yes   Shantel Jones  08/21/2019  11:44 AM

## 2019-08-21 NOTE — PLAN OF CARE
08/21/19 1237   AQUINO Message   Medicare Outpatient and Observation Notification regarding financial responsibility Given to patient/caregiver;Explained to patient/caregiver;Signed/date by patient/caregiver   Date AQUINO was signed 08/21/19   Time AQUINO was signed 1233

## 2019-08-22 ENCOUNTER — CLINICAL SUPPORT (OUTPATIENT)
Dept: CARDIOLOGY | Facility: HOSPITAL | Age: 84
End: 2019-08-22
Attending: INTERNAL MEDICINE
Payer: MEDICARE

## 2019-08-22 VITALS
DIASTOLIC BLOOD PRESSURE: 63 MMHG | RESPIRATION RATE: 17 BRPM | OXYGEN SATURATION: 100 % | HEART RATE: 70 BPM | BODY MASS INDEX: 22.32 KG/M2 | SYSTOLIC BLOOD PRESSURE: 129 MMHG | WEIGHT: 138.88 LBS | TEMPERATURE: 98 F | HEIGHT: 66 IN

## 2019-08-22 PROBLEM — R79.89 ELEVATED TROPONIN: Status: RESOLVED | Noted: 2019-08-20 | Resolved: 2019-08-22

## 2019-08-22 PROBLEM — M75.02 ADHESIVE CAPSULITIS OF LEFT SHOULDER: Status: ACTIVE | Noted: 2019-08-22

## 2019-08-22 PROCEDURE — 25000003 PHARM REV CODE 250: Performed by: HOSPITALIST

## 2019-08-22 PROCEDURE — G0378 HOSPITAL OBSERVATION PER HR: HCPCS

## 2019-08-22 PROCEDURE — 63600175 PHARM REV CODE 636 W HCPCS: Performed by: HOSPITALIST

## 2019-08-22 PROCEDURE — 96376 TX/PRO/DX INJ SAME DRUG ADON: CPT

## 2019-08-22 PROCEDURE — 93225 XTRNL ECG REC<48 HRS REC: CPT

## 2019-08-22 PROCEDURE — 94761 N-INVAS EAR/PLS OXIMETRY MLT: CPT

## 2019-08-22 RX ADMIN — Medication: at 08:08

## 2019-08-22 RX ADMIN — HYDRALAZINE HYDROCHLORIDE 10 MG: 20 INJECTION INTRAMUSCULAR; INTRAVENOUS at 02:08

## 2019-08-22 RX ADMIN — ASPIRIN 81 MG: 81 TABLET, COATED ORAL at 08:08

## 2019-08-22 NOTE — PLAN OF CARE
Problem: Adult Inpatient Plan of Care  Goal: Plan of Care Review     08/22/19 0441   Plan of Care Review   Plan of Care Reviewed With patient;son   Progress improving   Both patient and family updated. Son verbalizes understanding.       Problem: Fall Injury Risk  Goal: Absence of Fall and Fall-Related Injury    Intervention: Identify and Manage Contributors to Fall Injury Risk     08/22/19 0441   Manage Acute Allergic Reaction   Medication Review/Management medications reviewed   Identify and Manage Contributors to Fall Injury Risk   Self-Care Promotion BADL personal objects within reach   Bed in lowest position. Call light within reach. Son was a bedside for majority of night. Bed alarm is activated. Will cont to monitor.       Problem: Skin Injury Risk Increased  Goal: Skin Health and Integrity    Intervention: Optimize Skin Protection     08/21/19 0701 08/22/19 0200   Prevent Additional Skin Injury   Head of Bed (HOB)  --  HOB at 30-45 degrees   Pressure Reduction Techniques heels elevated off bed;pressure points protected  --

## 2019-08-22 NOTE — NURSING
I placed an order for the Tele sitter for Ms gordon. I received a call at 02:49 stating that the system was done and they didn't have any visual access for this patient. I place a call at 04:02 to get an update on the system, which was still not in working use. Will cont to check with the tele sitter to see Is system is back in use .

## 2019-08-22 NOTE — NURSING
Another call was place to the tele sitter, I talked to Angelita Raymond she states that the system is still down and they do not have any visual access to the patient. Son however remains at bedside, will pass on to day nurse.

## 2019-08-22 NOTE — DISCHARGE SUMMARY
[facility] Medicine  Discharge Summary      Patient Name: Maru Mansfield  MRN: [id number]  Admission Date: 8/20/2019  Hospital Length of Stay: 0 days  Discharge Date and Time:  08/22/2019 10:32 AM  Attending Physician: Isa Gonzalez MD   Discharging Provider: Isa Gonzalez MD  Primary Care Provider: Kannan Acevedo MD      HPI:   Patient is a 89-year-old female who came to the emergency room for complaints of left shoulder pain. Her history is obtained from ER physician who states patient complained of left shoulder pain therefore was brought to the emergency department.  Patient denies chest pain or shortness of breath or abdominal pain. Upon my examination patient denies shoulder pain or chest pain.  She appears to have dementia.  She is by herself and there are no family members currently available.  Her ECG  shows atrial fibrillation which is chronic however her rate s is in the 30s.at  times  Blood pressure is stable.  Otherwise history of present illness is unknown    * No surgery found *      Hospital Course:   She was admitted for observation, bradycardia was noted but she was not symptomatic.  A 24 hr holter monitor was arranged, and she will follow up with Dr. Floyd.      Consults:   Consults (From admission, onward)        Status Ordering Provider     Inpatient consult to Cardiology  Once     Provider:  MD Vaughn Rodas EVAN L.     Inpatient consult to Hospitalist  Once     Provider:  DO Vaughn Harris EVAN L.     IP consult to case management  Once     Provider:  (Not yet assigned)    ALISA Michael          Adhesive capsulitis of left shoulder  Recommend application of voltaren gel to affected area      Dementia  stable      Atrial fibrillation with slow ventricular response  Resume anticoagulation  24hr holter monitor      Final Active Diagnoses:    Diagnosis Date Noted POA    PRINCIPAL  PROBLEM:  Bradycardia [R00.1] 08/20/2019 Yes    Adhesive capsulitis of left shoulder [M75.02] 08/22/2019 Unknown    Atrial fibrillation with slow ventricular response [I48.91] 08/20/2019 Yes    Dementia [F03.90] 08/20/2019 Unknown    Monoclonal paraproteinemia [D47.2] 08/10/2017 Yes      Problems Resolved During this Admission:    Diagnosis Date Noted Date Resolved POA    Elevated troponin [R74.8] 08/20/2019 08/22/2019 Unknown       Discharged Condition: fair    Disposition: Home or Self Care    Follow Up:  Follow-up Information     Jerod Floyd MD In 2 weeks.    Specialty:  Cardiology  Contact information:  [facility]  [address]  [facility] [id number] [phone]                 Patient Instructions:      Diet Cardiac     Activity as tolerated       Significant Diagnostic Studies: Labs:   BMP:   Recent Labs   Lab 08/20/19  1532 08/21/19  0323    124*    137   K 3.8 3.6    106   CO2 26 25   BUN 42* 38*   CREATININE 1.2 1.0   CALCIUM 9.8 9.7   MG 2.1  --    , CMP   Recent Labs   Lab 08/20/19  1532 08/21/19  0323    137   K 3.8 3.6    106   CO2 26 25    124*   BUN 42* 38*   CREATININE 1.2 1.0   CALCIUM 9.8 9.7   PROT 7.3  --    ALBUMIN 3.7  --    BILITOT 0.7  --    ALKPHOS 93  --    AST 30  --    ALT 20  --    ANIONGAP 8 6*   ESTGFRAFRICA 45.3* 56.5*   EGFRNONAA 39.3* 49.0*    and CBC   Recent Labs   Lab 08/20/19  1532 08/21/19  0323   WBC 5.47 7.14   HGB 13.7 12.9   HCT 42.3 39.0    183       Pending Diagnostic Studies:     None         Medications:  Reconciled Home Medications:      Medication List      CONTINUE taking these medications    acetaminophen 500 MG tablet  Commonly known as:  TYLENOL  Take 500 mg by mouth every evening.     amLODIPine 5 MG tablet  Commonly known as:  NORVASC  Take 5 mg by mouth once daily.     aspirin 81 MG EC tablet  Commonly known as:  ECOTRIN  Take 81 mg by mouth once daily.     calcitRIOL 0.25 MCG Cap  Commonly known as:   ROCALTROL     calcium carbonate 600 mg calcium (1,500 mg) Tab  Commonly known as:  OS-CHELITA  Take 600 mg by mouth once.     cyanocobalamin 500 MCG tablet  Take 500 mcg by mouth once daily.     ELIQUIS 2.5 mg Tab  Generic drug:  apixaban  Take 2.5 mg by mouth 2 (two) times daily.     fluticasone propionate 50 mcg/actuation nasal spray  Commonly known as:  FLONASE     furosemide 20 MG tablet  Commonly known as:  LASIX  20 mg once daily.     gabapentin 100 MG capsule  Commonly known as:  NEURONTIN  100 mg every evening.     HYALURONIC ACID (SODIUM) ORAL  Take 140 mg by mouth once daily.     ibuprofen 800 MG tablet  Commonly known as:  ADVIL,MOTRIN     latanoprost 0.005 % ophthalmic solution  Place into both eyes every evening.     loratadine 10 mg tablet  Commonly known as:  CLARITIN  Take 10 mg by mouth once daily.     lovastatin 20 MG tablet  Commonly known as:  MEVACOR  20 mg every evening.     MEGARED OMEGA-3 KRILL OIL ORAL  Take 500 mg by mouth once daily.     melatonin 5 mg Tab  Take by mouth.     multivitamin capsule  Take 1 capsule by mouth once daily.     psyllium 0.52 gram capsule  Take 1.56 g by mouth once daily.     trospium 20 mg Tab tablet  Commonly known as:  sanctura  20 mg every evening.        STOP taking these medications    timolol maleate 0.5% 0.5 % Solg  Commonly known as:  TIMOPTIC-XE            Indwelling Lines/Drains at time of discharge:   Lines/Drains/Airways     Pressure Ulcer                 Pressure Injury 08/21/19 0942 Sacral spine #1 Stage 1 1 day                Time spent on the discharge of patient: 25 minutes  Patient was seen and examined on the date of discharge and determined to be suitable for discharge.         Isa Gonzalez MD  Department of Hospital Medicine  [facility]

## 2019-08-22 NOTE — DISCHARGE INSTRUCTIONS
Apply Voltaren Gel to left shoulder twice daily.   Discontinue Timolol ophthalmic eye drops. Please follow up with Ophthalmologist for a non-beta blocker alternative.

## 2019-08-22 NOTE — NURSING
Discharge instructions, new prescription and follow up appointments explained to this patients son. He verbalized understanding. Patient discharged home with her son at this time.

## 2019-09-12 LAB
OHS CV EVENT MONITOR DAY: 0
OHS CV HOLTER LENGTH DECIMAL HOURS: 24
OHS CV HOLTER LENGTH HOURS: 24
OHS CV HOLTER LENGTH MINUTES: 0

## 2019-09-13 ENCOUNTER — TELEPHONE (OUTPATIENT)
Dept: FAMILY MEDICINE | Facility: CLINIC | Age: 84
End: 2019-09-13

## 2019-09-13 NOTE — TELEPHONE ENCOUNTER
----- Message from Liza Mullins sent at 9/13/2019  9:59 AM CDT -----  Contact: Avelino patient's son   Son would like to know is the patient due for any lab work before her appointment on Monday?   Avelino's# [phone]

## 2019-09-16 ENCOUNTER — OFFICE VISIT (OUTPATIENT)
Dept: FAMILY MEDICINE | Facility: CLINIC | Age: 84
End: 2019-09-16
Payer: MEDICARE

## 2019-09-16 VITALS
SYSTOLIC BLOOD PRESSURE: 132 MMHG | HEIGHT: 65 IN | DIASTOLIC BLOOD PRESSURE: 68 MMHG | HEART RATE: 56 BPM | WEIGHT: 139 LBS | BODY MASS INDEX: 23.16 KG/M2

## 2019-09-16 DIAGNOSIS — M75.02 ADHESIVE CAPSULITIS OF LEFT SHOULDER: ICD-10-CM

## 2019-09-16 DIAGNOSIS — N32.81 OAB (OVERACTIVE BLADDER): ICD-10-CM

## 2019-09-16 DIAGNOSIS — E78.5 DYSLIPIDEMIA: ICD-10-CM

## 2019-09-16 DIAGNOSIS — I12.9 HYPERTENSIVE KIDNEY DISEASE WITH STAGE 3 CHRONIC KIDNEY DISEASE: Primary | ICD-10-CM

## 2019-09-16 DIAGNOSIS — N18.30 CKD (CHRONIC KIDNEY DISEASE) STAGE 3, GFR 30-59 ML/MIN: ICD-10-CM

## 2019-09-16 DIAGNOSIS — N18.30 HYPERTENSIVE KIDNEY DISEASE WITH STAGE 3 CHRONIC KIDNEY DISEASE: Primary | ICD-10-CM

## 2019-09-16 DIAGNOSIS — D47.2 MONOCLONAL PARAPROTEINEMIA: ICD-10-CM

## 2019-09-16 PROCEDURE — 99214 PR OFFICE/OUTPT VISIT, EST, LEVL IV, 30-39 MIN: ICD-10-PCS | Mod: S$GLB,,, | Performed by: NURSE PRACTITIONER

## 2019-09-16 PROCEDURE — 99214 OFFICE O/P EST MOD 30 MIN: CPT | Mod: S$GLB,,, | Performed by: NURSE PRACTITIONER

## 2019-09-16 RX ORDER — TROSPIUM CHLORIDE 20 MG/1
20 TABLET, FILM COATED ORAL NIGHTLY
Qty: 90 TABLET | Refills: 1 | Status: SHIPPED | OUTPATIENT
Start: 2019-09-16 | End: 2020-01-01

## 2019-09-16 RX ORDER — DICLOFENAC SODIUM 10 MG/G
4 GEL TOPICAL 2 TIMES DAILY
Qty: 240 G | Refills: 5 | Status: SHIPPED | OUTPATIENT
Start: 2019-09-16 | End: 2020-01-01 | Stop reason: SDUPTHER

## 2019-09-16 RX ORDER — LOVASTATIN 20 MG/1
20 TABLET ORAL NIGHTLY
Qty: 90 TABLET | Refills: 1 | Status: SHIPPED | OUTPATIENT
Start: 2019-09-16 | End: 2020-01-01 | Stop reason: SDUPTHER

## 2019-09-16 RX ORDER — DICLOFENAC SODIUM 10 MG/G
GEL TOPICAL
Refills: 1 | COMMUNITY
Start: 2019-08-22 | End: 2019-09-16

## 2019-09-16 NOTE — PROGRESS NOTES
SUBJECTIVE:    Patient ID: Maru Mansfield is a 89 y.o. female.    Chief Complaint: Follow-up    Pt here for regular f/u with her son who is primary caregiver.  Admitted with shoulder pain last month and noted to be bradycardic-see by Dr. Floyd during hospital stay and timolol stopped- son reports HR improved- ranges high 40-50's. Follows with Dr. Floyd, Dr. Barron and Dr. Fregoso.  Patient denies any complaints today.  Patient does ambulate short distance with use of walker inside the home otherwise she is primarily in the wheelchair when her son brings her out.  He monitors her closely and ensures she eats properly 3 times a day, also gets up with her several times during the night to help her get to the bathroom to void. Denies any recent falls. Was given Rx for diclofenac gel for left shoulder pain which seems to be helping a lot- needs refill.      Admission on 08/20/2019, Discharged on 08/22/2019   Component Date Value Ref Range Status    Magnesium 08/20/2019 2.1  1.6 - 2.6 mg/dL Final    WBC 08/20/2019 5.47  3.90 - 12.70 K/uL Final    RBC 08/20/2019 4.46  4.00 - 5.40 M/uL Final    Hemoglobin 08/20/2019 13.7  12.0 - 16.0 g/dL Final    Hematocrit 08/20/2019 42.3  37.0 - 48.5 % Final    Mean Corpuscular Volume 08/20/2019 95  82 - 98 fL Final    Mean Corpuscular Hemoglobin 08/20/2019 30.7  27.0 - 31.0 pg Final    Mean Corpuscular Hemoglobin Conc 08/20/2019 32.4  32.0 - 36.0 g/dL Final    RDW 08/20/2019 14.2  11.5 - 14.5 % Final    Platelets 08/20/2019 185  150 - 350 K/uL Final    MPV 08/20/2019 10.9  9.2 - 12.9 fL Final    Immature Granulocytes 08/20/2019 0.2  0.0 - 0.5 % Final    Gran # (ANC) 08/20/2019 3.0  1.8 - 7.7 K/uL Final    Immature Grans (Abs) 08/20/2019 0.01  0.00 - 0.04 K/uL Final    Lymph # 08/20/2019 1.4  1.0 - 4.8 K/uL Final    Mono # 08/20/2019 0.5  0.3 - 1.0 K/uL Final    Eos # 08/20/2019 0.5  0.0 - 0.5 K/uL Final    Baso # 08/20/2019 0.05  0.00 - 0.20 K/uL Final    nRBC  08/20/2019 0  0 /100 WBC Final    Gran% 08/20/2019 55.1  38.0 - 73.0 % Final    Lymph% 08/20/2019 26.0  18.0 - 48.0 % Final    Mono% 08/20/2019 8.8  4.0 - 15.0 % Final    Eosinophil% 08/20/2019 9.0* 0.0 - 8.0 % Final    Basophil% 08/20/2019 0.9  0.0 - 1.9 % Final    Differential Method 08/20/2019 Automated   Final    Sodium 08/20/2019 138  136 - 145 mmol/L Final    Potassium 08/20/2019 3.8  3.5 - 5.1 mmol/L Final    Chloride 08/20/2019 104  95 - 110 mmol/L Final    CO2 08/20/2019 26  23 - 29 mmol/L Final    Glucose 08/20/2019 110  70 - 110 mg/dL Final    BUN, Bld 08/20/2019 42* 10 - 30 mg/dL Final    Creatinine 08/20/2019 1.2  0.5 - 1.4 mg/dL Final    Calcium 08/20/2019 9.8  8.7 - 10.5 mg/dL Final    Total Protein 08/20/2019 7.3  6.0 - 8.4 g/dL Final    Albumin 08/20/2019 3.7  3.5 - 5.2 g/dL Final    Total Bilirubin 08/20/2019 0.7  0.1 - 1.0 mg/dL Final    Alkaline Phosphatase 08/20/2019 93  55 - 135 U/L Final    AST 08/20/2019 30  10 - 40 U/L Final    ALT 08/20/2019 20  10 - 44 U/L Final    Anion Gap 08/20/2019 8  8 - 16 mmol/L Final    eGFR if  08/20/2019 45.3* >60 mL/min/1.73 m^2 Final    eGFR if non  08/20/2019 39.3* >60 mL/min/1.73 m^2 Final    Troponin I 08/20/2019 0.045* 0.020 - 0.040 ng/mL Final    Specimen UA 08/21/2019 Urine, Clean Catch   Final    Color, UA 08/21/2019 Yellow  Yellow, Straw, Jewels Final    Appearance, UA 08/21/2019 Clear  Clear Final    pH, UA 08/21/2019 6.0  5.0 - 8.0 Final    Specific Gravity, UA 08/21/2019 1.015  1.005 - 1.030 Final    Protein, UA 08/21/2019 Trace* Negative Final    Glucose, UA 08/21/2019 Negative  Negative Final    Ketones, UA 08/21/2019 Negative  Negative Final    Bilirubin (UA) 08/21/2019 Negative  Negative Final    Occult Blood UA 08/21/2019 Negative  Negative Final    Nitrite, UA 08/21/2019 Negative  Negative Final    Urobilinogen, UA 08/21/2019 Negative  Negative EU/dL Final    Leukocytes, UA  08/21/2019 Trace* Negative Final    Troponin I 08/20/2019 0.042* 0.020 - 0.040 ng/mL Final    TSH 08/20/2019 0.120* 0.340 - 5.600 uIU/mL Final    Free T4 08/20/2019 1.05  0.71 - 1.51 ng/dL Final    Troponin I 08/20/2019 0.047* 0.020 - 0.040 ng/mL Final    Sodium 08/21/2019 137  136 - 145 mmol/L Final    Potassium 08/21/2019 3.6  3.5 - 5.1 mmol/L Final    Chloride 08/21/2019 106  95 - 110 mmol/L Final    CO2 08/21/2019 25  23 - 29 mmol/L Final    Glucose 08/21/2019 124* 70 - 110 mg/dL Final    BUN, Bld 08/21/2019 38* 10 - 30 mg/dL Final    Creatinine 08/21/2019 1.0  0.5 - 1.4 mg/dL Final    Calcium 08/21/2019 9.7  8.7 - 10.5 mg/dL Final    Anion Gap 08/21/2019 6* 8 - 16 mmol/L Final    eGFR if  08/21/2019 56.5* >60 mL/min/1.73 m^2 Final    eGFR if non African American 08/21/2019 49.0* >60 mL/min/1.73 m^2 Final    WBC 08/21/2019 7.14  3.90 - 12.70 K/uL Final    RBC 08/21/2019 4.19  4.00 - 5.40 M/uL Final    Hemoglobin 08/21/2019 12.9  12.0 - 16.0 g/dL Final    Hematocrit 08/21/2019 39.0  37.0 - 48.5 % Final    Mean Corpuscular Volume 08/21/2019 93  82 - 98 fL Final    Mean Corpuscular Hemoglobin 08/21/2019 30.8  27.0 - 31.0 pg Final    Mean Corpuscular Hemoglobin Conc 08/21/2019 33.1  32.0 - 36.0 g/dL Final    RDW 08/21/2019 14.1  11.5 - 14.5 % Final    Platelets 08/21/2019 183  150 - 350 K/uL Final    MPV 08/21/2019 11.1  9.2 - 12.9 fL Final    Immature Granulocytes 08/21/2019 0.3  0.0 - 0.5 % Final    Gran # (ANC) 08/21/2019 4.8  1.8 - 7.7 K/uL Final    Immature Grans (Abs) 08/21/2019 0.02  0.00 - 0.04 K/uL Final    Lymph # 08/21/2019 1.2  1.0 - 4.8 K/uL Final    Mono # 08/21/2019 0.6  0.3 - 1.0 K/uL Final    Eos # 08/21/2019 0.5  0.0 - 0.5 K/uL Final    Baso # 08/21/2019 0.04  0.00 - 0.20 K/uL Final    nRBC 08/21/2019 0  0 /100 WBC Final    Gran% 08/21/2019 67.4  38.0 - 73.0 % Final    Lymph% 08/21/2019 16.2* 18.0 - 48.0 % Final    Mono% 08/21/2019 8.4  4.0 - 15.0  % Final    Eosinophil% 08/21/2019 7.1  0.0 - 8.0 % Final    Basophil% 08/21/2019 0.6  0.0 - 1.9 % Final    Differential Method 08/21/2019 Automated   Final    RBC, UA 08/21/2019 4  0 - 4 /hpf Final    WBC, UA 08/21/2019 7* 0 - 5 /hpf Final    Bacteria 08/21/2019 Negative  None-Occ /hpf Final    Squam Epithel, UA 08/21/2019 3  /hpf Final    Hyaline Casts, UA 08/21/2019 5* 0-1/lpf /lpf Final    Microscopic Comment 08/21/2019 SEE COMMENT   Final    Holter length hours 08/22/2019 24   Final    holter length minutes 08/22/2019 0   Final    holter length dec hours 08/22/2019 24.00   Final    Event Monitor Day 08/22/2019 0   Final   Orders Only on 07/22/2019   Component Date Value Ref Range Status    Folate 07/22/2019 >24.0  ng/mL Final   Orders Only on 07/22/2019   Component Date Value Ref Range Status    WBC 07/22/2019 6.5  3.8 - 10.8 Thousand/uL Final    RBC 07/22/2019 4.40  3.80 - 5.10 Million/uL Final    Hemoglobin 07/22/2019 13.6  11.7 - 15.5 g/dL Final    Hematocrit 07/22/2019 40.6  35.0 - 45.0 % Final    Mean Corpuscular Volume 07/22/2019 92.3  80.0 - 100.0 fL Final    Mean Corpuscular Hemoglobin 07/22/2019 30.9  27.0 - 33.0 pg Final    Mean Corpuscular Hemoglobin Conc 07/22/2019 33.5  32.0 - 36.0 g/dL Final    RDW 07/22/2019 13.2  11.0 - 15.0 % Final    Platelets 07/22/2019 194  140 - 400 Thousand/uL Final    MPV 07/22/2019 11.3  7.5 - 12.5 fL Final    Neutrophils Absolute 07/22/2019 3,887  1,500 - 7,800 cells/uL Final    Lymph # 07/22/2019 1,359  850 - 3,900 cells/uL Final    Mono # 07/22/2019 618  200 - 950 cells/uL Final    Eos # 07/22/2019 566* 15 - 500 cells/uL Final    Baso # 07/22/2019 72  0 - 200 cells/uL Final    Neutrophils Relative 07/22/2019 59.8  % Final    Lymph% 07/22/2019 20.9  % Final    Mono% 07/22/2019 9.5  % Final    Eosinophil% 07/22/2019 8.7  % Final    Basophil% 07/22/2019 1.1  % Final   Telephone on 07/19/2019   Component Date Value Ref Range Status     Albumin 07/30/2019 100  % Final    Alpha-1-Globulins 07/30/2019   % Final    Alpha-2 Globulins 07/30/2019   % Final    Beta Globulins 07/30/2019   % Final    Gamma Globulin 07/30/2019   % Final    Interpretation 07/30/2019    Final    Vitamin B-12 07/22/2019 785  200 - 1,100 pg/mL Final    Creatinine, 24H Ur 07/30/2019 0.83  0.50 - 2.15 g/24 h Final    Protein/Creat Ratio 07/30/2019 102  < OR = 114 mg/g creat Final    Protein, Total, 24hr Urine 07/30/2019 84  <150 mg/24 h Final       Past Medical History:   Diagnosis Date    Anemia in chronic kidney disease(285.21) 8/10/2017    Anemia of other chronic disease 8/10/2017    Atrial fibrillation     Glaucoma     Heart failure, right-sided     Hyperlipemia     Hypertension     Monoclonal paraproteinemia 8/10/2017    Pulmonary hypertension     Secondary hyperparathyroidism      Past Surgical History:   Procedure Laterality Date    GALLBLADDER SURGERY       Family History   Problem Relation Age of Onset    Cancer Mother     Heart attack Father     Heart disease Father     Heart disease Maternal Aunt     Cancer Maternal Aunt     Heart attack Paternal Uncle     Cancer Maternal Grandmother        Marital Status:   Alcohol History:  reports that she does not drink alcohol.  Tobacco History:  reports that she quit smoking about 49 years ago. She has never used smokeless tobacco.  Drug History:  reports that she does not use drugs.    Review of patient's allergies indicates:   Allergen Reactions    Clindamycin     Hydroco     Hydrocodone     Pcn [penicillins]        Current Outpatient Medications:     acetaminophen (TYLENOL) 500 MG tablet, Take 500 mg by mouth every evening., Disp: , Rfl:     amLODIPine (NORVASC) 5 MG tablet, Take 5 mg by mouth once daily., Disp: , Rfl:     apixaban (ELIQUIS) 2.5 mg Tab, Take 2.5 mg by mouth 2 (two) times daily., Disp: , Rfl:     aspirin (ECOTRIN) 81 MG EC tablet, Take 81 mg by mouth once daily., Disp: ,  Rfl:     calcium carbonate (OS-CHELITA) 600 mg calcium (1,500 mg) Tab, Take 600 mg by mouth once., Disp: , Rfl:     fluticasone (FLONASE) 50 mcg/actuation nasal spray, , Disp: , Rfl:     gabapentin (NEURONTIN) 100 MG capsule, 100 mg every evening. , Disp: , Rfl:     hyaluronate sodium (HYALURONIC ACID, SODIUM, ORAL), Take 140 mg by mouth once daily., Disp: , Rfl:     krill/om-3/dha/epa/phospho/ast (MEGARED OMEGA-3 KRILL OIL ORAL), Take 500 mg by mouth once daily., Disp: , Rfl:     latanoprost 0.005 % ophthalmic solution, Place into both eyes every evening. , Disp: , Rfl:     loratadine (CLARITIN) 10 mg tablet, Take 10 mg by mouth once daily., Disp: , Rfl:     lovastatin (MEVACOR) 20 MG tablet, Take 1 tablet (20 mg total) by mouth every evening., Disp: 90 tablet, Rfl: 1    multivitamin capsule, Take 1 capsule by mouth once daily., Disp: , Rfl:     psyllium 0.52 gram capsule, Take 1.56 g by mouth once daily. , Disp: , Rfl:     trospium (SANCTURA) 20 mg Tab tablet, Take 1 tablet (20 mg total) by mouth every evening., Disp: 90 tablet, Rfl: 1    calcitRIOL (ROCALTROL) 0.25 MCG Cap, , Disp: , Rfl:     cyanocobalamin 500 MCG tablet, Take 500 mcg by mouth once daily., Disp: , Rfl:     diclofenac sodium (VOLTAREN) 1 % Gel, Apply 4 g topically 2 (two) times daily. To left shoulder prn, Disp: 240 g, Rfl: 5    FLUZONE HIGH-DOSE 2019-20, PF, 180 mcg/0.5 mL Syrg, PHARMACIST ADMINISTERED IMMUNIZATION ADMINISTERED AT TIME OF DISPENSING, Disp: , Rfl: 0    furosemide (LASIX) 20 MG tablet, 20 mg once daily. , Disp: , Rfl:     ibuprofen (ADVIL,MOTRIN) 800 MG tablet, , Disp: , Rfl:     melatonin 5 mg Tab, Take by mouth., Disp: , Rfl:     Review of Systems   Constitutional: Positive for fatigue. Negative for appetite change, chills and fever.   Respiratory: Positive for cough (chronic dry cough) and shortness of breath (with walking more than 10-20 feet). Negative for wheezing.    Cardiovascular: Negative for chest pain,  "palpitations and leg swelling.   Gastrointestinal: Positive for constipation (uses psyllium and laxative for BM every 2-3 days). Negative for abdominal pain and diarrhea.   Genitourinary: Positive for frequency (every 1-2 hours). Negative for difficulty urinating, dysuria and hematuria.   Skin: Negative for rash.   Neurological: Positive for weakness. Negative for dizziness, syncope and numbness.   Psychiatric/Behavioral: Negative for dysphoric mood. The patient is not nervous/anxious.           Objective:      Vitals:    09/16/19 1111   BP: 132/68   Pulse: (!) 56   Weight: 63 kg (139 lb)   Height: 5' 5" (1.651 m)     Physical Exam   Constitutional: She appears well-developed and well-nourished.   Elderly WF sitting in wheelchair, looks good today, has on makeup and her hair fixed     HENT:   Head: Normocephalic and atraumatic.   Right Ear: Tympanic membrane and ear canal normal.   Left Ear: Tympanic membrane and ear canal normal.   Mouth/Throat: Mucous membranes are normal. No posterior oropharyngeal erythema.   Neck: Neck supple. Carotid bruit is not present.   Cardiovascular: Normal rate and regular rhythm. Exam reveals no gallop and no friction rub.   No murmur heard.  Pulmonary/Chest: No respiratory distress. She has no wheezes. She has rales (few basilar CR bilat, partially cleared with cough).   Abdominal: Soft. She exhibits no distension. There is no tenderness.   Lymphadenopathy:     She has no cervical adenopathy.   Neurological: She is alert. She has normal strength.   Skin: Skin is warm and dry. No rash noted.   Psychiatric: She has a normal mood and affect.   Nursing note and vitals reviewed.        Assessment:       1. Hypertensive kidney disease with stage 3 chronic kidney disease    2. CKD (chronic kidney disease) stage 3, GFR 30-59 ml/min    3. Adhesive capsulitis of left shoulder    4. Dyslipidemia    5. OAB (overactive bladder)    6. Monoclonal paraproteinemia           Plan:       Hypertensive " kidney disease with stage 3 chronic kidney disease  -BP well controlled    CKD (chronic kidney disease) stage 3, GFR 30-59 ml/min  -stable on hospital labs    Adhesive capsulitis of left shoulder  -     diclofenac sodium (VOLTAREN) 1 % Gel; Apply 4 g topically 2 (two) times daily. To left shoulder prn  Dispense: 240 g; Refill: 5  -improved with topical diclofenac- advised will refill and request PA given pt cannot take oral NSAIDs    Dyslipidemia  -     lovastatin (MEVACOR) 20 MG tablet; Take 1 tablet (20 mg total) by mouth every evening.  Dispense: 90 tablet; Refill: 1  -sable    OAB (overactive bladder)  -     trospium (SANCTURA) 20 mg Tab tablet; Take 1 tablet (20 mg total) by mouth every evening.  Dispense: 90 tablet; Refill: 1  -stable on med    Monoclonal paraproteinemia  -stable followed by Dr. Fregoso        No follow-ups on file.        9/16/2019 Marci Eller NP

## 2019-10-08 ENCOUNTER — TELEPHONE (OUTPATIENT)
Dept: FAMILY MEDICINE | Facility: CLINIC | Age: 84
End: 2019-10-08

## 2019-10-08 NOTE — TELEPHONE ENCOUNTER
Please call pt's son and let her know I would recommend monitoring her BP and HR and make sure that's not too low causing her fatigue. Other than that hard to say exactly what her fatigue is from- no obvious concerns on labs in August. In terms of calcium he can get the chewable gummies and I think they have chewable multivitamins as well

## 2019-10-08 NOTE — TELEPHONE ENCOUNTER
"----- Message from Liza Mullins sent at 10/8/2019 10:01 AM CDT -----  Contact: Avelino patient's son  On Thursday pt slept from 8pm-5am without a bathroom break and he says that is very unusual for her.She is sleeping up to 20 hours a day and son asked her how she's feeling , she says she is felling " Puney". Also  the difficulty with swallowing pills has increased and he would like to know is there any liquid alternative to the calcium and multivitamin pills?? If so where can he get it from . Paul says to please give him a call back as soon as possible  Avelino's # [phone]  "

## 2019-12-03 ENCOUNTER — TELEPHONE (OUTPATIENT)
Dept: FAMILY MEDICINE | Facility: CLINIC | Age: 84
End: 2019-12-03

## 2019-12-03 NOTE — TELEPHONE ENCOUNTER
Let him know myrbetriq probably has less side effects however it is more expensive- tier 3 copay compared to oxybuytnin which is tier 2.

## 2019-12-03 NOTE — TELEPHONE ENCOUNTER
----- Message from Jah King sent at 12/3/2019 12:53 PM CST -----  [facility] is dropping a medication  they have the option of   1. Oxybutynin  2. Solisebcun   3.myrbetriq   Wants to know which one dr magana recommends   Pt son sylvia gordon [phone]

## 2019-12-09 ENCOUNTER — TELEPHONE (OUTPATIENT)
Dept: HEMATOLOGY/ONCOLOGY | Facility: CLINIC | Age: 84
End: 2019-12-09

## 2019-12-09 NOTE — TELEPHONE ENCOUNTER
----- Message from Onelia Salmon sent at 12/9/2019  2:01 PM CST -----  Avelino called to check with Dr Fregoso because Dr Acevedo changed the patient's Trospium to Myrbetriq and he wants to make sure it is ok for her to take this. He said the Trospium is not working well for her. Please call Avelino back at [phone].

## 2019-12-09 NOTE — TELEPHONE ENCOUNTER
----- Message from Onelia Salmon sent at 12/9/2019  2:01 PM CST -----  Avelino called to check with Dr Fregoso because Dr Acevedo changed the patient's Trospium to Myrbetriq and he wants to make sure it is ok for her to take this. He said the Trospium is not working well for her. Please call Avelino back at [phone].

## 2019-12-10 NOTE — TELEPHONE ENCOUNTER
Let them know I'll send in RX for myrbetriq 50mg daily but if we have samples please give them 4 weeks of myrbetriq 25mg- let them know it can take 4-6 weeks for myrbetriq to take full effect

## 2019-12-10 NOTE — TELEPHONE ENCOUNTER
Spoke with pt son he will come by and  the samples then get the rx. Pt wants the rx switched to a 90 day supply.

## 2019-12-10 NOTE — TELEPHONE ENCOUNTER
----- Message from Marci Cooney sent at 12/10/2019  8:32 AM CST -----  Pt wants an RX for Myrbetriq. [facility]. [facility] on [address]. Avelino @[phone]

## 2020-01-01 ENCOUNTER — TELEPHONE (OUTPATIENT)
Dept: HEMATOLOGY/ONCOLOGY | Facility: CLINIC | Age: 85
End: 2020-01-01

## 2020-01-01 ENCOUNTER — PATIENT MESSAGE (OUTPATIENT)
Dept: FAMILY MEDICINE | Facility: CLINIC | Age: 85
End: 2020-01-01

## 2020-01-01 ENCOUNTER — TELEPHONE (OUTPATIENT)
Dept: FAMILY MEDICINE | Facility: CLINIC | Age: 85
End: 2020-01-01

## 2020-01-01 ENCOUNTER — OFFICE VISIT (OUTPATIENT)
Dept: FAMILY MEDICINE | Facility: CLINIC | Age: 85
End: 2020-01-01
Payer: MEDICARE

## 2020-01-01 VITALS — HEART RATE: 55 BPM | TEMPERATURE: 98 F | SYSTOLIC BLOOD PRESSURE: 163 MMHG | DIASTOLIC BLOOD PRESSURE: 48 MMHG

## 2020-01-01 DIAGNOSIS — E78.5 DYSLIPIDEMIA: ICD-10-CM

## 2020-01-01 DIAGNOSIS — I27.20 PULMONARY HYPERTENSION: ICD-10-CM

## 2020-01-01 DIAGNOSIS — N18.30 HYPERTENSIVE KIDNEY DISEASE WITH STAGE 3 CHRONIC KIDNEY DISEASE: ICD-10-CM

## 2020-01-01 DIAGNOSIS — M75.02 ADHESIVE CAPSULITIS OF LEFT SHOULDER: ICD-10-CM

## 2020-01-01 DIAGNOSIS — I48.91 ATRIAL FIBRILLATION WITH SLOW VENTRICULAR RESPONSE: ICD-10-CM

## 2020-01-01 DIAGNOSIS — N18.30 CKD (CHRONIC KIDNEY DISEASE) STAGE 3, GFR 30-59 ML/MIN: ICD-10-CM

## 2020-01-01 DIAGNOSIS — N32.81 OAB (OVERACTIVE BLADDER): Primary | ICD-10-CM

## 2020-01-01 DIAGNOSIS — R60.0 BILATERAL LEG EDEMA: Primary | ICD-10-CM

## 2020-01-01 DIAGNOSIS — I12.9 HYPERTENSIVE KIDNEY DISEASE WITH STAGE 3 CHRONIC KIDNEY DISEASE: ICD-10-CM

## 2020-01-01 PROCEDURE — 1159F MED LIST DOCD IN RCRD: CPT | Mod: 95,,, | Performed by: NURSE PRACTITIONER

## 2020-01-01 PROCEDURE — 1159F PR MEDICATION LIST DOCUMENTED IN MEDICAL RECORD: ICD-10-PCS | Mod: 95,,, | Performed by: NURSE PRACTITIONER

## 2020-01-01 PROCEDURE — 1101F PT FALLS ASSESS-DOCD LE1/YR: CPT | Mod: 95,,, | Performed by: NURSE PRACTITIONER

## 2020-01-01 PROCEDURE — 99213 OFFICE O/P EST LOW 20 MIN: CPT | Mod: 95,,, | Performed by: NURSE PRACTITIONER

## 2020-01-01 PROCEDURE — 99213 PR OFFICE/OUTPT VISIT, EST, LEVL III, 20-29 MIN: ICD-10-PCS | Mod: 95,,, | Performed by: NURSE PRACTITIONER

## 2020-01-01 PROCEDURE — 1101F PR PT FALLS ASSESS DOC 0-1 FALLS W/OUT INJ PAST YR: ICD-10-PCS | Mod: 95,,, | Performed by: NURSE PRACTITIONER

## 2020-01-01 RX ORDER — MIRABEGRON 50 MG/1
50 TABLET, FILM COATED, EXTENDED RELEASE ORAL DAILY
Qty: 90 TABLET | Refills: 1 | Status: SHIPPED | OUTPATIENT
Start: 2020-01-01

## 2020-01-01 RX ORDER — LOVASTATIN 20 MG/1
20 TABLET ORAL NIGHTLY
Qty: 90 TABLET | Refills: 1 | Status: ON HOLD | OUTPATIENT
Start: 2020-01-01 | End: 2021-01-01 | Stop reason: HOSPADM

## 2020-01-01 RX ORDER — DICLOFENAC SODIUM 10 MG/G
4 GEL TOPICAL 4 TIMES DAILY PRN
Qty: 450 G | Refills: 5 | Status: SHIPPED | OUTPATIENT
Start: 2020-01-01

## 2020-01-01 RX ORDER — LOVASTATIN 20 MG/1
20 TABLET ORAL NIGHTLY
Qty: 90 TABLET | Refills: 1 | Status: SHIPPED | OUTPATIENT
Start: 2020-01-01 | End: 2020-01-01 | Stop reason: SDUPTHER

## 2020-01-01 RX ORDER — FUROSEMIDE 20 MG/1
20 TABLET ORAL DAILY
Qty: 3 TABLET | Refills: 0
Start: 2020-01-01 | End: 2020-01-01

## 2020-01-01 RX ORDER — FUROSEMIDE 20 MG/1
20 TABLET ORAL
Qty: 30 TABLET | Refills: 2 | Status: ON HOLD | OUTPATIENT
Start: 2020-01-01 | End: 2021-01-01 | Stop reason: HOSPADM

## 2020-01-10 LAB
ALBUMIN SERPL ELPH-MCNC: 3.8 G/DL (ref 3.8–4.8)
ALBUMIN SERPL-MCNC: 3.8 G/DL (ref 3.6–5.1)
ALBUMIN/GLOB SERPL: 1.3 (CALC) (ref 1–2.5)
ALP SERPL-CCNC: 92 U/L (ref 33–130)
ALPHA1 GLOB SERPL ELPH-MCNC: 0.3 G/DL (ref 0.2–0.3)
ALPHA2 GLOB SERPL ELPH-MCNC: 0.9 G/DL (ref 0.5–0.9)
ALT SERPL-CCNC: 13 U/L (ref 6–29)
AST SERPL-CCNC: 21 U/L (ref 10–35)
B2 MICROGLOB SERPL-MCNC: 3.66 MG/L
BASOPHILS # BLD AUTO: 77 CELLS/UL (ref 0–200)
BASOPHILS NFR BLD AUTO: 1.3 %
BETA1 GLOB SERPL ELPH-MCNC: 0.4 G/DL (ref 0.4–0.6)
BETA2 GLOB SERPL ELPH-MCNC: 0.3 G/DL (ref 0.2–0.5)
BILIRUB SERPL-MCNC: 0.6 MG/DL (ref 0.2–1.2)
BUN SERPL-MCNC: 41 MG/DL (ref 7–25)
BUN/CREAT SERPL: 34 (CALC) (ref 6–22)
CALCIUM SERPL-MCNC: 9.8 MG/DL (ref 8.6–10.4)
CHLORIDE SERPL-SCNC: 104 MMOL/L (ref 98–110)
CO2 SERPL-SCNC: 24 MMOL/L (ref 20–32)
CREAT SERPL-MCNC: 1.2 MG/DL (ref 0.6–0.88)
EOSINOPHIL # BLD AUTO: 625 CELLS/UL (ref 15–500)
EOSINOPHIL NFR BLD AUTO: 10.6 %
ERYTHROCYTE [DISTWIDTH] IN BLOOD BY AUTOMATED COUNT: 12.7 % (ref 11–15)
GAMMA GLOB SERPL ELPH-MCNC: 1.3 G/DL (ref 0.8–1.7)
GFRSERPLBLD MDRD-ARVRAT: 39 ML/MIN/1.73M2
GLOBULIN SER CALC-MCNC: 2.9 G/DL (CALC) (ref 1.9–3.7)
GLUCOSE SERPL-MCNC: 86 MG/DL (ref 65–99)
HCT VFR BLD AUTO: 39.9 % (ref 35–45)
HGB BLD-MCNC: 13.5 G/DL (ref 11.7–15.5)
IGA SERPL-MCNC: 141 MG/DL (ref 70–320)
IGG SERPL-MCNC: 1332 MG/DL (ref 600–1540)
IGM SERPL-MCNC: 72 MG/DL (ref 50–300)
KAPPA LC FREE SER-MCNC: 27.7 MG/L (ref 3.3–19.4)
KAPPA LC FREE/LAMBDA FREE SER: 1.42 {RATIO} (ref 0.26–1.65)
LAMBDA LC FREE SERPL-MCNC: 19.5 MG/L (ref 5.7–26.3)
LYMPHOCYTES # BLD AUTO: 1333 CELLS/UL (ref 850–3900)
LYMPHOCYTES NFR BLD AUTO: 22.6 %
M PROTEIN 1 SERPL ELPH-MCNC: 0.9 G/DL
MCH RBC QN AUTO: 31.5 PG (ref 27–33)
MCHC RBC AUTO-ENTMCNC: 33.8 G/DL (ref 32–36)
MCV RBC AUTO: 93.2 FL (ref 80–100)
MONOCYTES # BLD AUTO: 525 CELLS/UL (ref 200–950)
MONOCYTES NFR BLD AUTO: 8.9 %
NEUTROPHILS # BLD AUTO: 3339 CELLS/UL (ref 1500–7800)
NEUTROPHILS NFR BLD AUTO: 56.6 %
PLATELET # BLD AUTO: 196 THOUSAND/UL (ref 140–400)
PMV BLD REES-ECKER: 11.3 FL (ref 7.5–12.5)
POTASSIUM SERPL-SCNC: 4.3 MMOL/L (ref 3.5–5.3)
PROT PATTERN SERPL ELPH-IMP: ABNORMAL
PROT SERPL-MCNC: 6.7 G/DL (ref 6.1–8.1)
PROT SERPL-MCNC: 6.9 G/DL (ref 6.1–8.1)
RBC # BLD AUTO: 4.28 MILLION/UL (ref 3.8–5.1)
SODIUM SERPL-SCNC: 141 MMOL/L (ref 135–146)
WBC # BLD AUTO: 5.9 THOUSAND/UL (ref 3.8–10.8)

## 2020-01-16 ENCOUNTER — OFFICE VISIT (OUTPATIENT)
Dept: FAMILY MEDICINE | Facility: CLINIC | Age: 85
End: 2020-01-16
Payer: MEDICARE

## 2020-01-16 VITALS
SYSTOLIC BLOOD PRESSURE: 112 MMHG | HEART RATE: 56 BPM | DIASTOLIC BLOOD PRESSURE: 60 MMHG | WEIGHT: 145 LBS | BODY MASS INDEX: 24.16 KG/M2 | HEIGHT: 65 IN | OXYGEN SATURATION: 99 %

## 2020-01-16 DIAGNOSIS — N18.30 HYPERTENSIVE KIDNEY DISEASE WITH STAGE 3 CHRONIC KIDNEY DISEASE: Primary | ICD-10-CM

## 2020-01-16 DIAGNOSIS — N18.30 CKD (CHRONIC KIDNEY DISEASE) STAGE 3, GFR 30-59 ML/MIN: ICD-10-CM

## 2020-01-16 DIAGNOSIS — N25.81 SECONDARY HYPERPARATHYROIDISM OF RENAL ORIGIN: ICD-10-CM

## 2020-01-16 DIAGNOSIS — I27.20 PULMONARY HYPERTENSION: ICD-10-CM

## 2020-01-16 DIAGNOSIS — N32.81 OAB (OVERACTIVE BLADDER): ICD-10-CM

## 2020-01-16 DIAGNOSIS — E78.5 DYSLIPIDEMIA: ICD-10-CM

## 2020-01-16 DIAGNOSIS — I12.9 HYPERTENSIVE KIDNEY DISEASE WITH STAGE 3 CHRONIC KIDNEY DISEASE: Primary | ICD-10-CM

## 2020-01-16 PROCEDURE — 1101F PR PT FALLS ASSESS DOC 0-1 FALLS W/OUT INJ PAST YR: ICD-10-PCS | Mod: S$GLB,,, | Performed by: NURSE PRACTITIONER

## 2020-01-16 PROCEDURE — 99214 PR OFFICE/OUTPT VISIT, EST, LEVL IV, 30-39 MIN: ICD-10-PCS | Mod: S$GLB,,, | Performed by: NURSE PRACTITIONER

## 2020-01-16 PROCEDURE — 1159F PR MEDICATION LIST DOCUMENTED IN MEDICAL RECORD: ICD-10-PCS | Mod: S$GLB,,, | Performed by: NURSE PRACTITIONER

## 2020-01-16 PROCEDURE — 1159F MED LIST DOCD IN RCRD: CPT | Mod: S$GLB,,, | Performed by: NURSE PRACTITIONER

## 2020-01-16 PROCEDURE — 99214 OFFICE O/P EST MOD 30 MIN: CPT | Mod: S$GLB,,, | Performed by: NURSE PRACTITIONER

## 2020-01-16 PROCEDURE — 1101F PT FALLS ASSESS-DOCD LE1/YR: CPT | Mod: S$GLB,,, | Performed by: NURSE PRACTITIONER

## 2020-01-16 NOTE — PROGRESS NOTES
SUBJECTIVE:    Patient ID: Maru Mansfield is a 89 y.o. female.    Chief Complaint: Follow-up (brought bottles, will check for PNA 23// SW)    Pt here for regular f/u- here with her son who is her primary caregiver. Pt reports overall feeling okay. Walking around with rollator at home, no falls.  Son has her drinking a liter of water and a liter of protein mixture daily. Appetite is good and eating well. Has gained 6lbs since last visit.  Son reporting continues with urinary frequency- every 1-1.5 hours, no improvement with myrbetriq. Sleeps a lot during the day  Has f/u with Dr. Floyd end of January.  Had recent visit with Dr. Barron- BP was 98/60, told labs looked good      Admission on 08/20/2019, Discharged on 08/22/2019   Component Date Value Ref Range Status    Magnesium 08/20/2019 2.1  1.6 - 2.6 mg/dL Final    WBC 08/20/2019 5.47  3.90 - 12.70 K/uL Final    RBC 08/20/2019 4.46  4.00 - 5.40 M/uL Final    Hemoglobin 08/20/2019 13.7  12.0 - 16.0 g/dL Final    Hematocrit 08/20/2019 42.3  37.0 - 48.5 % Final    Mean Corpuscular Volume 08/20/2019 95  82 - 98 fL Final    Mean Corpuscular Hemoglobin 08/20/2019 30.7  27.0 - 31.0 pg Final    Mean Corpuscular Hemoglobin Conc 08/20/2019 32.4  32.0 - 36.0 g/dL Final    RDW 08/20/2019 14.2  11.5 - 14.5 % Final    Platelets 08/20/2019 185  150 - 350 K/uL Final    MPV 08/20/2019 10.9  9.2 - 12.9 fL Final    Immature Granulocytes 08/20/2019 0.2  0.0 - 0.5 % Final    Gran # (ANC) 08/20/2019 3.0  1.8 - 7.7 K/uL Final    Immature Grans (Abs) 08/20/2019 0.01  0.00 - 0.04 K/uL Final    Lymph # 08/20/2019 1.4  1.0 - 4.8 K/uL Final    Mono # 08/20/2019 0.5  0.3 - 1.0 K/uL Final    Eos # 08/20/2019 0.5  0.0 - 0.5 K/uL Final    Baso # 08/20/2019 0.05  0.00 - 0.20 K/uL Final    nRBC 08/20/2019 0  0 /100 WBC Final    Gran% 08/20/2019 55.1  38.0 - 73.0 % Final    Lymph% 08/20/2019 26.0  18.0 - 48.0 % Final    Mono% 08/20/2019 8.8  4.0 - 15.0 % Final    Eosinophil%  08/20/2019 9.0* 0.0 - 8.0 % Final    Basophil% 08/20/2019 0.9  0.0 - 1.9 % Final    Differential Method 08/20/2019 Automated   Final    Sodium 08/20/2019 138  136 - 145 mmol/L Final    Potassium 08/20/2019 3.8  3.5 - 5.1 mmol/L Final    Chloride 08/20/2019 104  95 - 110 mmol/L Final    CO2 08/20/2019 26  23 - 29 mmol/L Final    Glucose 08/20/2019 110  70 - 110 mg/dL Final    BUN, Bld 08/20/2019 42* 10 - 30 mg/dL Final    Creatinine 08/20/2019 1.2  0.5 - 1.4 mg/dL Final    Calcium 08/20/2019 9.8  8.7 - 10.5 mg/dL Final    Total Protein 08/20/2019 7.3  6.0 - 8.4 g/dL Final    Albumin 08/20/2019 3.7  3.5 - 5.2 g/dL Final    Total Bilirubin 08/20/2019 0.7  0.1 - 1.0 mg/dL Final    Alkaline Phosphatase 08/20/2019 93  55 - 135 U/L Final    AST 08/20/2019 30  10 - 40 U/L Final    ALT 08/20/2019 20  10 - 44 U/L Final    Anion Gap 08/20/2019 8  8 - 16 mmol/L Final    eGFR if  08/20/2019 45.3* >60 mL/min/1.73 m^2 Final    eGFR if non  08/20/2019 39.3* >60 mL/min/1.73 m^2 Final    Troponin I 08/20/2019 0.045* 0.020 - 0.040 ng/mL Final    Specimen UA 08/21/2019 Urine, Clean Catch   Final    Color, UA 08/21/2019 Yellow  Yellow, Straw, Jewels Final    Appearance, UA 08/21/2019 Clear  Clear Final    pH, UA 08/21/2019 6.0  5.0 - 8.0 Final    Specific Gravity, UA 08/21/2019 1.015  1.005 - 1.030 Final    Protein, UA 08/21/2019 Trace* Negative Final    Glucose, UA 08/21/2019 Negative  Negative Final    Ketones, UA 08/21/2019 Negative  Negative Final    Bilirubin (UA) 08/21/2019 Negative  Negative Final    Occult Blood UA 08/21/2019 Negative  Negative Final    Nitrite, UA 08/21/2019 Negative  Negative Final    Urobilinogen, UA 08/21/2019 Negative  Negative EU/dL Final    Leukocytes, UA 08/21/2019 Trace* Negative Final    Troponin I 08/20/2019 0.042* 0.020 - 0.040 ng/mL Final    TSH 08/20/2019 0.120* 0.340 - 5.600 uIU/mL Final    Free T4 08/20/2019 1.05  0.71 - 1.51  ng/dL Final    Troponin I 08/20/2019 0.047* 0.020 - 0.040 ng/mL Final    Sodium 08/21/2019 137  136 - 145 mmol/L Final    Potassium 08/21/2019 3.6  3.5 - 5.1 mmol/L Final    Chloride 08/21/2019 106  95 - 110 mmol/L Final    CO2 08/21/2019 25  23 - 29 mmol/L Final    Glucose 08/21/2019 124* 70 - 110 mg/dL Final    BUN, Bld 08/21/2019 38* 10 - 30 mg/dL Final    Creatinine 08/21/2019 1.0  0.5 - 1.4 mg/dL Final    Calcium 08/21/2019 9.7  8.7 - 10.5 mg/dL Final    Anion Gap 08/21/2019 6* 8 - 16 mmol/L Final    eGFR if  08/21/2019 56.5* >60 mL/min/1.73 m^2 Final    eGFR if non African American 08/21/2019 49.0* >60 mL/min/1.73 m^2 Final    WBC 08/21/2019 7.14  3.90 - 12.70 K/uL Final    RBC 08/21/2019 4.19  4.00 - 5.40 M/uL Final    Hemoglobin 08/21/2019 12.9  12.0 - 16.0 g/dL Final    Hematocrit 08/21/2019 39.0  37.0 - 48.5 % Final    Mean Corpuscular Volume 08/21/2019 93  82 - 98 fL Final    Mean Corpuscular Hemoglobin 08/21/2019 30.8  27.0 - 31.0 pg Final    Mean Corpuscular Hemoglobin Conc 08/21/2019 33.1  32.0 - 36.0 g/dL Final    RDW 08/21/2019 14.1  11.5 - 14.5 % Final    Platelets 08/21/2019 183  150 - 350 K/uL Final    MPV 08/21/2019 11.1  9.2 - 12.9 fL Final    Immature Granulocytes 08/21/2019 0.3  0.0 - 0.5 % Final    Gran # (ANC) 08/21/2019 4.8  1.8 - 7.7 K/uL Final    Immature Grans (Abs) 08/21/2019 0.02  0.00 - 0.04 K/uL Final    Lymph # 08/21/2019 1.2  1.0 - 4.8 K/uL Final    Mono # 08/21/2019 0.6  0.3 - 1.0 K/uL Final    Eos # 08/21/2019 0.5  0.0 - 0.5 K/uL Final    Baso # 08/21/2019 0.04  0.00 - 0.20 K/uL Final    nRBC 08/21/2019 0  0 /100 WBC Final    Gran% 08/21/2019 67.4  38.0 - 73.0 % Final    Lymph% 08/21/2019 16.2* 18.0 - 48.0 % Final    Mono% 08/21/2019 8.4  4.0 - 15.0 % Final    Eosinophil% 08/21/2019 7.1  0.0 - 8.0 % Final    Basophil% 08/21/2019 0.6  0.0 - 1.9 % Final    Differential Method 08/21/2019 Automated   Final    RBC, UA 08/21/2019 4   0 - 4 /hpf Final    WBC, UA 08/21/2019 7* 0 - 5 /hpf Final    Bacteria 08/21/2019 Negative  None-Occ /hpf Final    Squam Epithel, UA 08/21/2019 3  /hpf Final    Hyaline Casts, UA 08/21/2019 5* 0-1/lpf /lpf Final    Microscopic Comment 08/21/2019 SEE COMMENT   Final    Holter length hours 08/22/2019 24   Final    holter length minutes 08/22/2019 0   Final    holter length dec hours 08/22/2019 24.00   Final    Event Monitor Day 08/22/2019 0   Final   Office Visit on 07/30/2019   Component Date Value Ref Range Status    IgA 01/06/2020 141  70 - 320 mg/dL Final    IgG, Serum 01/06/2020 1,332  600 - 1,540 mg/dL Final    IgM 01/06/2020 72  50 - 300 mg/dL Final    Beta 2 Microglobulin, Serum 01/06/2020 3.66* < OR = 2.51 mg/L Final    Kappa Light Chain, Free, Serum 01/06/2020 27.7* 3.3 - 19.4 mg/L Final    Lambda Light Chain, Free, Serum 01/06/2020 19.5  5.7 - 26.3 mg/L Final    Kappa/Lambda Light Chains Free wit* 01/06/2020 1.42  0.26 - 1.65 Final    WBC 01/06/2020 5.9  3.8 - 10.8 Thousand/uL Final    RBC 01/06/2020 4.28  3.80 - 5.10 Million/uL Final    Hemoglobin 01/06/2020 13.5  11.7 - 15.5 g/dL Final    Hematocrit 01/06/2020 39.9  35.0 - 45.0 % Final    Mean Corpuscular Volume 01/06/2020 93.2  80.0 - 100.0 fL Final    Mean Corpuscular Hemoglobin 01/06/2020 31.5  27.0 - 33.0 pg Final    Mean Corpuscular Hemoglobin Conc 01/06/2020 33.8  32.0 - 36.0 g/dL Final    RDW 01/06/2020 12.7  11.0 - 15.0 % Final    Platelets 01/06/2020 196  140 - 400 Thousand/uL Final    MPV 01/06/2020 11.3  7.5 - 12.5 fL Final    Neutrophils Absolute 01/06/2020 3,339  1,500 - 7,800 cells/uL Final    Lymph # 01/06/2020 1,333  850 - 3,900 cells/uL Final    Mono # 01/06/2020 525  200 - 950 cells/uL Final    Eos # 01/06/2020 625* 15 - 500 cells/uL Final    Baso # 01/06/2020 77  0 - 200 cells/uL Final    Neutrophils Relative 01/06/2020 56.6  % Final    Lymph% 01/06/2020 22.6  % Final    Mono% 01/06/2020 8.9  % Final     Eosinophil% 01/06/2020 10.6  % Final    Basophil% 01/06/2020 1.3  % Final    Glucose 01/06/2020 86  65 - 99 mg/dL Final    BUN, Bld 01/06/2020 41* 7 - 25 mg/dL Final    Creatinine 01/06/2020 1.20* 0.60 - 0.88 mg/dL Final    eGFR if non African American 01/06/2020 39* > OR = 60 mL/min/1.73m2 Final    eGFR if African American 01/06/2020 45* > OR = 60 mL/min/1.73m2 Final    BUN/Creatinine Ratio 01/06/2020 34* 6 - 22 (calc) Final    Sodium 01/06/2020 141  135 - 146 mmol/L Final    Potassium 01/06/2020 4.3  3.5 - 5.3 mmol/L Final    Chloride 01/06/2020 104  98 - 110 mmol/L Final    CO2 01/06/2020 24  20 - 32 mmol/L Final    Calcium 01/06/2020 9.8  8.6 - 10.4 mg/dL Final    Total Protein 01/06/2020 6.7  6.1 - 8.1 g/dL Final    Albumin 01/06/2020 3.8  3.6 - 5.1 g/dL Final    Globulin, Total 01/06/2020 2.9  1.9 - 3.7 g/dL (calc) Final    Albumin/Globulin Ratio 01/06/2020 1.3  1.0 - 2.5 (calc) Final    Total Bilirubin 01/06/2020 0.6  0.2 - 1.2 mg/dL Final    Alkaline Phosphatase 01/06/2020 92  33 - 130 U/L Final    AST 01/06/2020 21  10 - 35 U/L Final    ALT 01/06/2020 13  6 - 29 U/L Final    Total Protein 01/06/2020 6.9  6.1 - 8.1 g/dL Final    Albumin 01/06/2020 3.8  3.8 - 4.8 g/dL Final    Alpha-1-Globulins 01/06/2020 0.3  0.2 - 0.3 g/dL Final    Alpha-2-Globulins 01/06/2020 0.9  0.5 - 0.9 g/dL Final    Beta Globulin 01/06/2020 0.4  0.4 - 0.6 g/dL Final    Beta-2 Microglobulin 01/06/2020 0.3  0.2 - 0.5 g/dL Final    Gamma Globulin 01/06/2020 1.3  0.8 - 1.7 g/dL Final    Abnormal Protein Band 01/06/2020 0.9* NONE DETECTED g/dL Final    Interpretation 01/06/2020    Final   Orders Only on 07/22/2019   Component Date Value Ref Range Status    Folate 07/22/2019 >24.0  ng/mL Final   Orders Only on 07/22/2019   Component Date Value Ref Range Status    WBC 07/22/2019 6.5  3.8 - 10.8 Thousand/uL Final    RBC 07/22/2019 4.40  3.80 - 5.10 Million/uL Final    Hemoglobin 07/22/2019 13.6  11.7 -  15.5 g/dL Final    Hematocrit 07/22/2019 40.6  35.0 - 45.0 % Final    Mean Corpuscular Volume 07/22/2019 92.3  80.0 - 100.0 fL Final    Mean Corpuscular Hemoglobin 07/22/2019 30.9  27.0 - 33.0 pg Final    Mean Corpuscular Hemoglobin Conc 07/22/2019 33.5  32.0 - 36.0 g/dL Final    RDW 07/22/2019 13.2  11.0 - 15.0 % Final    Platelets 07/22/2019 194  140 - 400 Thousand/uL Final    MPV 07/22/2019 11.3  7.5 - 12.5 fL Final    Neutrophils Absolute 07/22/2019 3,887  1,500 - 7,800 cells/uL Final    Lymph # 07/22/2019 1,359  850 - 3,900 cells/uL Final    Mono # 07/22/2019 618  200 - 950 cells/uL Final    Eos # 07/22/2019 566* 15 - 500 cells/uL Final    Baso # 07/22/2019 72  0 - 200 cells/uL Final    Neutrophils Relative 07/22/2019 59.8  % Final    Lymph% 07/22/2019 20.9  % Final    Mono% 07/22/2019 9.5  % Final    Eosinophil% 07/22/2019 8.7  % Final    Basophil% 07/22/2019 1.1  % Final   Telephone on 07/19/2019   Component Date Value Ref Range Status    Albumin 07/30/2019 100  % Final    Alpha-1-Globulins 07/30/2019   % Final    Alpha-2 Globulins 07/30/2019   % Final    Beta Globulins 07/30/2019   % Final    Gamma Globulin 07/30/2019   % Final    Interpretation 07/30/2019    Final    Vitamin B-12 07/22/2019 785  200 - 1,100 pg/mL Final    Creatinine, 24H Ur 07/30/2019 0.83  0.50 - 2.15 g/24 h Final    Protein/Creat Ratio 07/30/2019 102  < OR = 114 mg/g creat Final    Protein, Total, 24hr Urine 07/30/2019 84  <150 mg/24 h Final       Past Medical History:   Diagnosis Date    Anemia in chronic kidney disease(285.21) 8/10/2017    Anemia of other chronic disease 8/10/2017    Atrial fibrillation     Glaucoma     Heart failure, right-sided     Hyperlipemia     Hypertension     Monoclonal paraproteinemia 8/10/2017    Pulmonary hypertension     Secondary hyperparathyroidism      Past Surgical History:   Procedure Laterality Date    GALLBLADDER SURGERY       Family History   Problem Relation Age  of Onset    Cancer Mother     Heart attack Father     Heart disease Father     Heart disease Maternal Aunt     Cancer Maternal Aunt     Heart attack Paternal Uncle     Cancer Maternal Grandmother        Marital Status:   Alcohol History:  reports that she does not drink alcohol.  Tobacco History:  reports that she quit smoking about 50 years ago. She has never used smokeless tobacco.  Drug History:  reports that she does not use drugs.    Review of patient's allergies indicates:   Allergen Reactions    Clindamycin     Hydroco     Hydrocodone     Pcn [penicillins]        Current Outpatient Medications:     acetaminophen (TYLENOL) 500 MG tablet, Take 500 mg by mouth every evening., Disp: , Rfl:     amLODIPine (NORVASC) 5 MG tablet, Take 5 mg by mouth once daily., Disp: , Rfl:     apixaban (ELIQUIS) 2.5 mg Tab, Take 2.5 mg by mouth 2 (two) times daily., Disp: , Rfl:     aspirin (ECOTRIN) 81 MG EC tablet, Take 81 mg by mouth once daily., Disp: , Rfl:     calcitRIOL (ROCALTROL) 0.25 MCG Cap, , Disp: , Rfl:     calcium carbonate (OS-CHELITA) 600 mg calcium (1,500 mg) Tab, Take 600 mg by mouth once., Disp: , Rfl:     diclofenac sodium (VOLTAREN) 1 % Gel, Apply 4 g topically 2 (two) times daily. To left shoulder prn, Disp: 240 g, Rfl: 5    fluticasone (FLONASE) 50 mcg/actuation nasal spray, , Disp: , Rfl:     FLUZONE HIGH-DOSE 2019-20, PF, 180 mcg/0.5 mL Syrg, PHARMACIST ADMINISTERED IMMUNIZATION ADMINISTERED AT TIME OF DISPENSING, Disp: , Rfl: 0    gabapentin (NEURONTIN) 100 MG capsule, 100 mg every evening. , Disp: , Rfl:     hyaluronate sodium (HYALURONIC ACID, SODIUM, ORAL), Take 140 mg by mouth once daily., Disp: , Rfl:     ibuprofen (ADVIL,MOTRIN) 800 MG tablet, , Disp: , Rfl:     krill/om-3/dha/epa/phospho/ast (MEGARED OMEGA-3 KRILL OIL ORAL), Take 500 mg by mouth once daily., Disp: , Rfl:     latanoprost 0.005 % ophthalmic solution, Place into both eyes every evening. , Disp: , Rfl:      "loratadine (CLARITIN) 10 mg tablet, Take 10 mg by mouth once daily., Disp: , Rfl:     lovastatin (MEVACOR) 20 MG tablet, Take 1 tablet (20 mg total) by mouth every evening., Disp: 90 tablet, Rfl: 1    mirabegron (MYRBETRIQ) 50 mg Tb24, Take 1 tablet (50 mg total) by mouth once daily., Disp: 90 tablet, Rfl: 1    multivitamin capsule, Take 1 capsule by mouth once daily., Disp: , Rfl:     psyllium 0.52 gram capsule, Take 1.56 g by mouth once daily. , Disp: , Rfl:     vit A/vit C/vit E/zinc/copper (ICAPS AREDS ORAL), Take by mouth., Disp: , Rfl:     cyanocobalamin 500 MCG tablet, Take 500 mcg by mouth once daily., Disp: , Rfl:     furosemide (LASIX) 20 MG tablet, 20 mg once daily. , Disp: , Rfl:     melatonin 5 mg Tab, Take by mouth., Disp: , Rfl:     trospium (SANCTURA) 20 mg Tab tablet, Take 1 tablet (20 mg total) by mouth every evening., Disp: 90 tablet, Rfl: 1    Review of Systems   Constitutional: Positive for fatigue and unexpected weight change (weight gain 6lbs since last visit). Negative for chills and fever.   Respiratory: Positive for cough (chronic dry cough) and shortness of breath (minimal with exertion, denies orthopnea). Negative for wheezing.    Cardiovascular: Negative for chest pain, palpitations and leg swelling.   Gastrointestinal: Negative for abdominal pain, constipation and diarrhea.   Genitourinary: Positive for frequency. Negative for dysuria and hematuria.   Skin: Negative for rash.   Neurological: Negative for dizziness and headaches.   Psychiatric/Behavioral: Positive for sleep disturbance (d/t urinary frequency).          Objective:      Vitals:    01/16/20 1008   BP: 112/60   Pulse: (!) 56   SpO2: 99%   Weight: 65.8 kg (145 lb)   Height: 5' 5" (1.651 m)     Physical Exam   Constitutional: She is oriented to person, place, and time. She appears well-developed and well-nourished.   Elderly WF sitting in WC   HENT:   Head: Normocephalic and atraumatic.   Right Ear: Ear canal normal. "   Left Ear: Ear canal normal.   Mouth/Throat: Mucous membranes are normal. No posterior oropharyngeal erythema.   bilat ear canals partially obstructed with wax   Neck: Neck supple. Carotid bruit is not present.   Cardiovascular: Normal rate and regular rhythm. Exam reveals no gallop and no friction rub.   No murmur heard.  Pulmonary/Chest: Effort normal. No respiratory distress. She has no wheezes. She has rales (few coarse CR left base posteriorly, partialling cleared with cough).   Abdominal: Soft. She exhibits no distension. There is no tenderness.   Musculoskeletal: She exhibits no edema.   Lymphadenopathy:     She has no cervical adenopathy.   Neurological: She is alert and oriented to person, place, and time. She has normal strength. Gait normal.   Skin: Skin is warm and dry. No rash noted.   Psychiatric: She has a normal mood and affect.   Nursing note and vitals reviewed.        Assessment:       1. Hypertensive kidney disease with stage 3 chronic kidney disease    2. CKD (chronic kidney disease) stage 3, GFR 30-59 ml/min    3. OAB (overactive bladder)    4. Dyslipidemia    5. Secondary hyperparathyroidism of renal origin    6. Pulmonary hypertension           Plan:       Hypertensive kidney disease with stage 3 chronic kidney disease  -     TSH w/reflex to FT4; Future; Expected date: 01/16/2020  -     Comprehensive metabolic panel; Future; Expected date: 01/16/2020  -blood pressure stable    CKD (chronic kidney disease) stage 3, GFR 30-59 ml/min  -some reports labs stable on recent follow-up Dr. Barron    OAB (overactive bladder)  -discussed with patient/son treatment options for urinary frequency.  She previously was on trospium which did not help and now on Myrbetriq without much change.  Discussed oxybutynin or VESIcare however due to constipation side effects son would like to avoid these medications.  Will continue Myrbetriq for now    Dyslipidemia  -     Lipid panel; Future; Expected date:  01/16/2020  -stable, repeat labs with next bloodwork    Secondary hyperparathyroidism of renal origin  -stable, managed by Dr. Barron    Pulmonary hypertension  -stable, denies any significant shortness of breath    Follow up in about 4 months (around 5/16/2020) for HTN.        1/16/2020 Marci Eller NP

## 2020-01-19 NOTE — PROGRESS NOTES
[facility] Hematology/Oncology  PROGRESS NOTE      Subjective:       Patient ID:   NAME: Maru Mansfield : 1927     89 y.o. female    Referring Doc: Curtis  Other Physicians: Eliseo Barron George Thomas    Chief Complaint:  MGUS f/u    History of Present Illness:     Patient returns today for a regularly scheduled follow-up visit.  The patient is here with her son. She has been having some memory issues according to her son ; no CP, SOB, HA's or N/V; her appetite is up and down; She uses a walker to walk and occasionally wheelchair; she is in wheelchair today.         ROS:   GEN: normal without any fever, night sweats or weight loss; decreased appetite intermittently  HEENT: normal with no HA's, sore throat, stiff neck, changes in vision  CV: normal with no CP, SOB, PND, CREWS or orthopnea  PULM: normal with no SOB, hemoptysis, sputum or pleuritic pain; occ chronic cough   GI: normal with no abdominal pain, nausea, vomiting, constipation, diarrhea, melanotic stools, BRBPR, or hematemesis  : normal with no hematuria, dysuria  BREAST: normal with no mass, discharge, pain  SKIN: normal with no rash, erythema, bruising, or swelling    Allergies:  Review of patient's allergies indicates:  Allergies not on file    Medications:    Current Outpatient Medications:     acetaminophen (TYLENOL) 500 MG tablet, Take 500 mg by mouth every evening., Disp: , Rfl:     amLODIPine (NORVASC) 5 MG tablet, Take 5 mg by mouth once daily., Disp: , Rfl:     apixaban (ELIQUIS) 2.5 mg Tab, Take 2.5 mg by mouth 2 (two) times daily., Disp: , Rfl:     aspirin (ECOTRIN) 81 MG EC tablet, Take 81 mg by mouth once daily., Disp: , Rfl:     calcitRIOL (ROCALTROL) 0.25 MCG Cap, , Disp: , Rfl:     calcium carbonate (OS-CHELITA) 600 mg calcium (1,500 mg) Tab, Take 600 mg by mouth once., Disp: , Rfl:     cyanocobalamin 500 MCG tablet, Take 500 mcg by mouth once daily., Disp: , Rfl:     diclofenac sodium (VOLTAREN) 1 % Gel, Apply 4 g topically 2  (two) times daily. To left shoulder prn, Disp: 240 g, Rfl: 5    fluticasone (FLONASE) 50 mcg/actuation nasal spray, , Disp: , Rfl:     FLUZONE HIGH-DOSE 2019-20, PF, 180 mcg/0.5 mL Syrg, PHARMACIST ADMINISTERED IMMUNIZATION ADMINISTERED AT TIME OF DISPENSING, Disp: , Rfl: 0    furosemide (LASIX) 20 MG tablet, 20 mg once daily. , Disp: , Rfl:     gabapentin (NEURONTIN) 100 MG capsule, 100 mg every evening. , Disp: , Rfl:     hyaluronate sodium (HYALURONIC ACID, SODIUM, ORAL), Take 140 mg by mouth once daily., Disp: , Rfl:     ibuprofen (ADVIL,MOTRIN) 800 MG tablet, , Disp: , Rfl:     krill/om-3/dha/epa/phospho/ast (MEGARED OMEGA-3 KRILL OIL ORAL), Take 500 mg by mouth once daily., Disp: , Rfl:     latanoprost 0.005 % ophthalmic solution, Place into both eyes every evening. , Disp: , Rfl:     loratadine (CLARITIN) 10 mg tablet, Take 10 mg by mouth once daily., Disp: , Rfl:     lovastatin (MEVACOR) 20 MG tablet, Take 1 tablet (20 mg total) by mouth every evening., Disp: 90 tablet, Rfl: 1    melatonin 5 mg Tab, Take by mouth., Disp: , Rfl:     mirabegron (MYRBETRIQ) 50 mg Tb24, Take 1 tablet (50 mg total) by mouth once daily., Disp: 90 tablet, Rfl: 1    multivitamin capsule, Take 1 capsule by mouth once daily., Disp: , Rfl:     psyllium 0.52 gram capsule, Take 1.56 g by mouth once daily. , Disp: , Rfl:     trospium (SANCTURA) 20 mg Tab tablet, Take 1 tablet (20 mg total) by mouth every evening., Disp: 90 tablet, Rfl: 1    vit A/vit C/vit E/zinc/copper (ICAPS AREDS ORAL), Take by mouth., Disp: , Rfl:     PMHx/PSHx Updates:  See patient's last visit with me on 7/30/2019  See H&P on 3/17/2010        Pathology:  Cancer Staging  No matching staging information was found for the patient.          Objective:     Vitals:  Blood pressure 121/66, pulse 69, temperature 97.7 °F (36.5 °C), temperature source Oral, resp. rate 18, weight 65.8 kg (145 lb).    Physical Examination:   GEN: no apparent distress,  comfortable; AAOx3  HEAD: atraumatic and normocephalic  EYES: no pallor, no icterus, PERRLA  ENT: OMM, no pharyngeal erythema, external ears WNL; no nasal discharge; no thrush  NECK: no masses, thyroid normal, trachea midline, no LAD/LN's, supple  CV: RRR with no murmur; normal pulse; normal S1 and S2; no pedal edema  CHEST: Normal respiratory effort; CTAB; normal breath sounds; no wheeze or crackles, she uses O2 at night  ABDOM: nontender and nondistended; soft; normal bowel sounds; no rebound/guarding  MUSC/Skeletal: ROM normal; no crepitus;chronic arthritic changes, she is in wheelchair today  EXTREM: no clubbing, cyanosis, inflammation or swelling  SKIN: no rashes, lesions, ulcers, petechiae or subcutaneous nodules  : no bryan  NEURO: grossly intact; motor/sensory WNL; AAOx3; no tremors  PSYCH: normal mood, affect and behavior  LYMPH: normal cervical, supraclavicular, axillary and groin LN's            Labs:     1/6/2020  Lab Results   Component Value Date    WBC 5.9 01/06/2020    HGB 13.5 01/06/2020    HCT 39.9 01/06/2020    MCV 93.2 01/06/2020     01/06/2020     BMP  Lab Results   Component Value Date     01/06/2020    K 4.3 01/06/2020     01/06/2020    CO2 24 01/06/2020    BUN 41 (H) 01/06/2020    CREATININE 1.20 (H) 01/06/2020    CALCIUM 9.8 01/06/2020    ANIONGAP 6 (L) 08/21/2019    ESTGFRAFRICA 45 (L) 01/06/2020    EGFRNONAA 39 (L) 01/06/2020     Lab Results   Component Value Date    ALT 13 01/06/2020    AST 21 01/06/2020    ALKPHOS 92 01/06/2020    BILITOT 0.6 01/06/2020     Contains abnormal data Immunoglobulin free LT chains blood   Order: [id number]   Status:  Final result   Visible to patient:  No (Not Released)   Next appt:  05/12/2020 at 10:00 AM in Family Medicine (Marci Eller NP)   Dx:  Anemia, chronic disease; Monoclonal p...    Ref Range & Units 2wk ago  (1/6/20) 6mo ago  (7/8/19) 11mo ago  (2/8/19) 1yr ago  (7/31/18) 1yr ago  (1/25/18) 2yr ago  (7/31/17)   Kappa Light  Chain, Free, Serum 3.3 - 19.4 mg/L 27.7High   27.0High   33.5High   28.7High   27.3High   28.4High     Lambda Light Chain, Free, Serum 5.7 - 26.3 mg/L 19.5  18.1  24.6  20.6  19.7  19.0    Kappa/Lambda Light Chains Free with Ratio, Serum 0.26 - 1.65 1.42  1.49 CM 1.36 CM 1.39 CM 1.39 CM 1.49            Beta 2 Microglobulin, Serum < OR = 2.51 mg/L 3.66High      Immunoglobulins (IgG, IgA, IgM) Quantitative   Order: [id number]   Status:  Final result   Visible to patient:  No (Not Released) Next appt:  05/12/2020 at 10:00 AM in Family Medicine (Marci Eller NP) Dx:  Anemia, chronic disease; Monoclonal p...    Ref Range & Units 2wk ago 6mo ago   IgA 70 - 320 mg/dL 141  144 R   IgG, Serum 600 - 1,540 mg/dL 1,332  1,444    IgM 50 - 300 mg/dL 72  71              Protein Electrophoresis   Order: [id number]   Status:  Final result   Visible to patient:  No (Not Released) Next appt:  05/12/2020 at 10:00 AM in Family Medicine (Marci Eller NP)    Ref Range & Units 2wk ago  (1/6/20) 2wk ago  (1/6/20)   Albumin 3.8 - 4.8 g/dL 3.8  3.8 R   Alpha-1-Globulins 0.2 - 0.3 g/dL 0.3     Alpha-2-Globulins 0.5 - 0.9 g/dL 0.9     Beta Globulin 0.4 - 0.6 g/dL 0.4     Beta-2 Microglobulin 0.2 - 0.5 g/dL 0.3     Gamma Globulin 0.8 - 1.7 g/dL 1.3     Abnormal Protein Band NONE DETECTED g/dL 0.9High                    Radiology/Diagnostic Studies:    No results found.    I have reviewed all available lab results and radiology reports.    Assessment/Plan:   (1) 89 y.o. female with diagnosis of MGUS  - UPEP was just sent in today and pending  - protein band was at 0.9 and stable  - Ig panel was stable and WNL  - last Beta-2 microglobulin was 3.66 and better  - kappa/lambda ratio was good at 1.42    (2) Chronic anemia with multifactorial etiology including anemia of chronic disorders and anemia of chronic renal disease  - latest hgb wnl at  13.5     (3) HTN - followed by Dr Acevedo; BP still a little high    (4) CRI - followed by Dr Barron  with neph    (5) Basal cell skin ca - followed by Dr Rodrigez with derm; she completed a 3 month regimen of oral therapy per Dr Rodrigez's direction    (6) Atrial fibrillation - followed by Dr Javier with cardiology in past and now she sees Dr Michael Floyd; hospitalized in Nov 2017    (7) Noncompliance with labs studies    1. Anemia in chronic kidney disease, unspecified CKD stage     2. Anemia, chronic disease     3. Monoclonal paraproteinemia           PLAN:  1.  Repeat protein studies every 6 months  2. F/u with PCP, Neph and Derm  3. RTC in 6 months  4. Encouraged compliance  Fax note to Jose Marino, Kannan Acevedo MD, [place] and Elia    I spent over 15 mins of time with the patient. Over half of this time was spent couseling and coordinating care: reviewing materials, labs, reports and studies; making treatment and analytical decisions; ordering necessary labs, tests and studies; and discussing treatment options and setting up treatment plans.      Discussion:     I have explained all of the above in detail and the patient understands all of the current recommendation(s). I have answered all of their questions to the best of my ability and to their complete satisfaction.   The patient is to continue with the current management plan.            Electronically signed by Tomas Fregoso MD

## 2020-01-20 ENCOUNTER — OFFICE VISIT (OUTPATIENT)
Dept: HEMATOLOGY/ONCOLOGY | Facility: CLINIC | Age: 85
End: 2020-01-20
Payer: MEDICARE

## 2020-01-20 VITALS
TEMPERATURE: 98 F | WEIGHT: 145 LBS | SYSTOLIC BLOOD PRESSURE: 121 MMHG | BODY MASS INDEX: 24.13 KG/M2 | HEART RATE: 69 BPM | RESPIRATION RATE: 18 BRPM | DIASTOLIC BLOOD PRESSURE: 66 MMHG

## 2020-01-20 DIAGNOSIS — D47.2 MONOCLONAL PARAPROTEINEMIA: ICD-10-CM

## 2020-01-20 DIAGNOSIS — N18.9 ANEMIA IN CHRONIC KIDNEY DISEASE, UNSPECIFIED CKD STAGE: Primary | ICD-10-CM

## 2020-01-20 DIAGNOSIS — D63.1 ANEMIA IN CHRONIC KIDNEY DISEASE, UNSPECIFIED CKD STAGE: Primary | ICD-10-CM

## 2020-01-20 DIAGNOSIS — D63.8 ANEMIA, CHRONIC DISEASE: ICD-10-CM

## 2020-01-20 PROCEDURE — 1159F MED LIST DOCD IN RCRD: CPT | Mod: S$GLB,,, | Performed by: INTERNAL MEDICINE

## 2020-01-20 PROCEDURE — 1125F PR PAIN SEVERITY QUANTIFIED, PAIN PRESENT: ICD-10-PCS | Mod: S$GLB,,, | Performed by: INTERNAL MEDICINE

## 2020-01-20 PROCEDURE — 1159F PR MEDICATION LIST DOCUMENTED IN MEDICAL RECORD: ICD-10-PCS | Mod: S$GLB,,, | Performed by: INTERNAL MEDICINE

## 2020-01-20 PROCEDURE — 1125F AMNT PAIN NOTED PAIN PRSNT: CPT | Mod: S$GLB,,, | Performed by: INTERNAL MEDICINE

## 2020-01-20 PROCEDURE — 1101F PR PT FALLS ASSESS DOC 0-1 FALLS W/OUT INJ PAST YR: ICD-10-PCS | Mod: S$GLB,,, | Performed by: INTERNAL MEDICINE

## 2020-01-20 PROCEDURE — 1101F PT FALLS ASSESS-DOCD LE1/YR: CPT | Mod: S$GLB,,, | Performed by: INTERNAL MEDICINE

## 2020-01-20 PROCEDURE — 99213 PR OFFICE/OUTPT VISIT, EST, LEVL III, 20-29 MIN: ICD-10-PCS | Mod: S$GLB,,, | Performed by: INTERNAL MEDICINE

## 2020-01-20 PROCEDURE — 99213 OFFICE O/P EST LOW 20 MIN: CPT | Mod: S$GLB,,, | Performed by: INTERNAL MEDICINE

## 2020-02-19 RX ORDER — GABAPENTIN 100 MG/1
100 CAPSULE ORAL NIGHTLY
Qty: 90 CAPSULE | Refills: 1 | Status: SHIPPED | OUTPATIENT
Start: 2020-02-19

## 2020-02-19 NOTE — TELEPHONE ENCOUNTER
----- Message from Steffany Brewster sent at 2/19/2020 12:16 PM CST -----  Refill on Gabapentin to [facility]   cb # [phone]

## 2020-02-20 ENCOUNTER — TELEPHONE (OUTPATIENT)
Dept: FAMILY MEDICINE | Facility: CLINIC | Age: 85
End: 2020-02-20

## 2020-02-20 NOTE — TELEPHONE ENCOUNTER
----- Message from Liza Mullins sent at 2/20/2020  9:43 AM CST -----  Contact: Avelino amaro's son   Son would like to know if his mother could try a medication called Cobroxin, it's supposed to help with pain. He says that the mom is still complaining of pain. Please give son a call # [phone]

## 2020-02-20 NOTE — TELEPHONE ENCOUNTER
From what I can see cobroxin is a otc topical pain medication- should be fine to try for his mother's pain

## 2020-03-30 NOTE — TELEPHONE ENCOUNTER
----- Message from Liza Mullins sent at 3/30/2020 12:32 PM CDT -----  Contact: Avelino pt's son  Son is calling he says that he is set up and logged into his mom's patient portal and waiting for the nurse to respond back to him through message.   Avelino's # [phone]

## 2020-03-30 NOTE — PATIENT INSTRUCTIONS
Monitor ankle swelling and symptoms of shortness of breath or difficulty laying flat as an indicator as too much fluid. Continue to elevate legs and wear compression socks

## 2020-03-30 NOTE — PROGRESS NOTES
Subjective:        The chief complaint leading to consultation is: leg swelling  The patient location is:  home  Visit type: Virtual visit with synchronous audio and video    Pt seen via telemedicine visit. Spoke with pt's son Avelino who is primary caregiver- he reports pt started with worsening leg edema on 3/22- noticed right leg swelling then left leg started to swell. Has been measuring ankles and right ankle up from 10 1/8 to 11in, left ankle 10 3/4. Son reports pt mainly stays in recliner all day long, sleeps about 20 hours a day. Reports chronic cough though no recent worsening. Appetite is fair. Son reports pt hasn't been wearing compression socks as they bother her.  Home BP readings 130-160's  Reports had f/u with Dr. Barron in January- not due for labs again until July      Past Surgical History:   Procedure Laterality Date    GALLBLADDER SURGERY       Past Medical History:   Diagnosis Date    Anemia in chronic kidney disease(285.21) 8/10/2017    Anemia of other chronic disease 8/10/2017    Atrial fibrillation     Glaucoma     Heart failure, right-sided     Hyperlipemia     Hypertension     Monoclonal paraproteinemia 8/10/2017    Pulmonary hypertension     Secondary hyperparathyroidism      Family History   Problem Relation Age of Onset    Cancer Mother     Heart attack Father     Heart disease Father     Heart disease Maternal Aunt     Cancer Maternal Aunt     Heart attack Paternal Uncle     Cancer Maternal Grandmother         Social History:   Marital Status:   Alcohol History:  reports that she does not drink alcohol.  Tobacco History:  reports that she quit smoking about 50 years ago. She has never used smokeless tobacco.  Drug History:  reports that she does not use drugs.    Review of patient's allergies indicates:   Allergen Reactions    Clindamycin     Hydroco     Hydrocodone     Pcn [penicillins]        Current Outpatient Medications   Medication Sig Dispense  Refill    acetaminophen (TYLENOL) 500 MG tablet Take 500 mg by mouth every evening.      amLODIPine (NORVASC) 5 MG tablet Take 5 mg by mouth once daily.      apixaban (ELIQUIS) 2.5 mg Tab Take 2.5 mg by mouth 2 (two) times daily.      aspirin (ECOTRIN) 81 MG EC tablet Take 81 mg by mouth once daily.      calcitRIOL (ROCALTROL) 0.25 MCG Cap       calcium carbonate (OS-CHELITA) 600 mg calcium (1,500 mg) Tab Take 600 mg by mouth once.      gabapentin (NEURONTIN) 100 MG capsule Take 1 capsule (100 mg total) by mouth every evening. 90 capsule 1    hyaluronate sodium (HYALURONIC ACID, SODIUM, ORAL) Take 140 mg by mouth once daily.      krill/om-3/dha/epa/phospho/ast (MEGARED OMEGA-3 KRILL OIL ORAL) Take 500 mg by mouth once daily.      latanoprost 0.005 % ophthalmic solution Place into both eyes every evening.       loratadine (CLARITIN) 10 mg tablet Take 10 mg by mouth once daily.      lovastatin (MEVACOR) 20 MG tablet Take 1 tablet (20 mg total) by mouth every evening. 90 tablet 1    mirabegron (MYRBETRIQ) 50 mg Tb24 Take 1 tablet (50 mg total) by mouth once daily. 90 tablet 1    multivitamin capsule Take 1 capsule by mouth once daily.      vit A/vit C/vit E/zinc/copper (ICAPS AREDS ORAL) Take by mouth.      diclofenac sodium (VOLTAREN) 1 % Gel Apply 4 g topically 2 (two) times daily. To left shoulder prn 240 g 5    FLUZONE HIGH-DOSE 2019-20, PF, 180 mcg/0.5 mL Syrg PHARMACIST ADMINISTERED IMMUNIZATION ADMINISTERED AT TIME OF DISPENSING  0    furosemide (LASIX) 20 MG tablet Take 1 tablet (20 mg total) by mouth once daily. for 3 days 3 tablet 0    melatonin 5 mg Tab Take by mouth.      psyllium 0.52 gram capsule Take 1.56 g by mouth once daily.        No current facility-administered medications for this visit.        Review of Systems   Constitutional: Positive for fatigue. Negative for chills and fever.   Respiratory: Positive for cough (chronic, nonproductive). Negative for shortness of breath and  wheezing.    Cardiovascular: Positive for leg swelling. Negative for chest pain and palpitations.   Gastrointestinal: Negative for abdominal pain, constipation and diarrhea.   Genitourinary: Positive for frequency. Negative for dysuria and hematuria.   Musculoskeletal: Positive for gait problem.   Skin: Negative for rash.   Neurological: Positive for weakness. Negative for dizziness and headaches.         Objective:        Physical Exam:   Physical Exam   Constitutional: She appears well-developed and well-nourished.   Elderly WF laying in recliner   Neurological: She is alert.            Assessment:       1. Bilateral leg edema    2. Pulmonary hypertension    3. Atrial fibrillation with slow ventricular response    4. Hypertensive kidney disease with stage 3 chronic kidney disease    5. CKD (chronic kidney disease) stage 3, GFR 30-59 ml/min      Plan:   Bilateral leg edema  -     furosemide (LASIX) 20 MG tablet; Take 1 tablet (20 mg total) by mouth once daily. for 3 days  Dispense: 3 tablet; Refill: 0  -son advised to give pt furosemide daily for 3 days, stressed importance of compression socks and continue with elevation. Cautioned to call for any worsening or development of redness/warmth/drainage    Pulmonary hypertension  -pt with severe pulm HTN on echo in the past- son advised this is likely contributing to her leg edema as well as fatigue    Atrial fibrillation with slow ventricular response  -stable, f/u with Dr. Floyd    Hypertensive CKD  -BP a little on the high side though given advanced age no changes today    CKD 3  -stable on last labs. Son advised if she requires more regular use of diuretic may need labs prior to July. He reports it's too difficult to get his mother to the lab so would like to wait on labs right now d/t COVID19    Follow up for as scheduled.    Total time spent with patient: 20    Each patient to whom he or she provides medical services by telemedicine is:  (1) informed of the  relationship between the physician and patient and the respective role of any other health care provider with respect to management of the patient; and (2) notified that he or she may decline to receive medical services by telemedicine and may withdraw from such care at any time.    This note was created using [employer] voice recognition software that occasionally misinterprets phrases or words.

## 2020-04-13 NOTE — TELEPHONE ENCOUNTER
----- Message from Liza Mullins sent at 4/13/2020  2:48 PM CDT -----  Contact: Avelino pt's son  Son just received a txt from [facility], her furosemide is ready and he wasn't aware that it was being called into the pharmacy . He told them to cancel the rx at the [facility] because he has it already at home. He just wanted the nurse to be aware that it was cancelled.   Avelino's#  [phone]

## 2020-04-13 NOTE — TELEPHONE ENCOUNTER
Please call pt's son and let him know the only other option we have is to use the furosemide more often- 20mg 3 times a week. based on the picture from yesterday though it doesn't look that bad because I can still see the veins in her foot. The diuretic is unlikely to get the swelling to completely resolve, we are just trying to manage it from getting too bad or weeping fluid

## 2020-04-27 NOTE — TELEPHONE ENCOUNTER
Please call son and clarify where the periodic light yellow discharge is from, lower leg or somewhere else? Also does he want me to send in an appetite stimulant to see if that helps with improve her intake?

## 2020-06-16 NOTE — TELEPHONE ENCOUNTER
"Pt's son called in to advise that the blood work is "not going to happen".  He states she is not drinking enough fluids and he is not taking her out of the home.  "

## 2020-06-24 NOTE — TELEPHONE ENCOUNTER
----- Message from Onelia Salmon sent at 6/23/2020  2:18 PM CDT -----  Avelino the patient's son called and said the patient will not be getting her labs done due to covid and should she keep the appointment if she does not do the labs. Also he wants it to be a virtual visit because he does not want to bring her in to the office. Please call Avelino back at [phone].

## 2020-06-24 NOTE — TELEPHONE ENCOUNTER
Patient's son Avelino instructed me that he does not want to do a visit or a virtual visit because she is not doing any labs at present.  I instructed him that he can call us back when he feels that it is safe for her to come out and we will schedule her appt.

## 2020-06-24 NOTE — TELEPHONE ENCOUNTER
----- Message from Onelia Salmon sent at 6/23/2020  2:18 PM CDT -----  Avelino the patient's son called and said the patient will not be getting her labs done due to covid and should she keep the appointment if she does not do the labs. Also he wants it to be a virtual visit because he does not want to bring her in to the office. Please call Avelino back at [phone].

## 2020-09-03 NOTE — TELEPHONE ENCOUNTER
"Returned the patient's son Kyler call.  I instructed him that we need for her to come to the clinic to give us a urine specimen.  He said well "I am not Happy".  My mother is 93 years old and we have not left the house since covid.  I do not allow anybody in the house.  We cannot get a urine specimen and why do we need too.  I instructed him that we need to make sure that we are giving the right antibiotic to treat the infection.  I asked him if he has home health?  He stated no.  Susanna and I checked the chart and she has not been seen since January.  I instructed him that he needs to call her PCP to get the antibiotic.    He stated well ok and wanted to know why we just can't give the antibiotic.  I instructed him that we need to know what bacteria is in the urine so if the antibiotic don't work we will know what to treat it with.  I again told him to call her PCP.  "

## 2020-09-03 NOTE — TELEPHONE ENCOUNTER
----- Message from Shyla Yadav, Patient Care Assistant sent at 9/3/2020 10:40 AM CDT -----  Patient son (Avelino) called in stating the patient is urinating too Often and he feels she has a UTI. He can be reached at [phone]

## 2020-12-02 NOTE — TELEPHONE ENCOUNTER
----- Message from Steffany Brewster sent at 12/2/2020  9:06 AM CST -----  Pt's son calling for refill on Myrbetriq 50 mg to [facility] 90 day supply  # [phone]

## 2021-01-01 ENCOUNTER — TELEPHONE (OUTPATIENT)
Dept: FAMILY MEDICINE | Facility: CLINIC | Age: 86
End: 2021-01-01

## 2021-01-01 ENCOUNTER — OUTSIDE PLACE OF SERVICE (OUTPATIENT)
Dept: FAMILY MEDICINE | Facility: CLINIC | Age: 86
End: 2021-01-01
Payer: MEDICARE

## 2021-01-01 ENCOUNTER — PATIENT MESSAGE (OUTPATIENT)
Dept: FAMILY MEDICINE | Facility: CLINIC | Age: 86
End: 2021-01-01

## 2021-01-01 ENCOUNTER — HOSPITAL ENCOUNTER (INPATIENT)
Facility: HOSPITAL | Age: 86
LOS: 4 days | Discharge: NURSING, REHAB OR LONG-TERM CARE | DRG: 177 | End: 2021-02-10
Attending: STUDENT IN AN ORGANIZED HEALTH CARE EDUCATION/TRAINING PROGRAM | Admitting: STUDENT IN AN ORGANIZED HEALTH CARE EDUCATION/TRAINING PROGRAM
Payer: MEDICARE

## 2021-01-01 ENCOUNTER — TELEPHONE (OUTPATIENT)
Dept: MEDSURG UNIT | Facility: HOSPITAL | Age: 86
End: 2021-01-01

## 2021-01-01 ENCOUNTER — HOSPITAL ENCOUNTER (INPATIENT)
Facility: HOSPITAL | Age: 86
LOS: 2 days | DRG: 951 | End: 2021-02-23
Attending: INTERNAL MEDICINE | Admitting: STUDENT IN AN ORGANIZED HEALTH CARE EDUCATION/TRAINING PROGRAM
Payer: OTHER MISCELLANEOUS

## 2021-01-01 ENCOUNTER — PATIENT MESSAGE (OUTPATIENT)
Dept: HEMATOLOGY/ONCOLOGY | Facility: CLINIC | Age: 86
End: 2021-01-01

## 2021-01-01 ENCOUNTER — CLINICAL SUPPORT (OUTPATIENT)
Dept: CARDIOLOGY | Facility: HOSPITAL | Age: 86
DRG: 291 | End: 2021-01-01
Payer: MEDICARE

## 2021-01-01 ENCOUNTER — HOSPITAL ENCOUNTER (INPATIENT)
Facility: HOSPITAL | Age: 86
LOS: 1 days | Discharge: HOSPICE | DRG: 871 | End: 2021-02-21
Attending: EMERGENCY MEDICINE | Admitting: INTERNAL MEDICINE
Payer: MEDICARE

## 2021-01-01 ENCOUNTER — NURSE TRIAGE (OUTPATIENT)
Dept: ADMINISTRATIVE | Facility: CLINIC | Age: 86
End: 2021-01-01

## 2021-01-01 ENCOUNTER — HOSPITAL ENCOUNTER (INPATIENT)
Facility: HOSPITAL | Age: 86
LOS: 13 days | Discharge: NURSING, REHAB OR LONG-TERM CARE | DRG: 291 | End: 2021-01-28
Attending: EMERGENCY MEDICINE | Admitting: FAMILY MEDICINE
Payer: MEDICARE

## 2021-01-01 VITALS
RESPIRATION RATE: 12 BRPM | SYSTOLIC BLOOD PRESSURE: 144 MMHG | HEART RATE: 87 BPM | TEMPERATURE: 101 F | WEIGHT: 116.63 LBS | BODY MASS INDEX: 18.3 KG/M2 | HEIGHT: 67 IN | OXYGEN SATURATION: 92 % | DIASTOLIC BLOOD PRESSURE: 67 MMHG

## 2021-01-01 VITALS
BODY MASS INDEX: 24.65 KG/M2 | SYSTOLIC BLOOD PRESSURE: 153 MMHG | HEART RATE: 63 BPM | RESPIRATION RATE: 18 BRPM | DIASTOLIC BLOOD PRESSURE: 71 MMHG | OXYGEN SATURATION: 95 % | TEMPERATURE: 97 F | WEIGHT: 147.94 LBS | HEIGHT: 65 IN

## 2021-01-01 VITALS
RESPIRATION RATE: 17 BRPM | OXYGEN SATURATION: 96 % | HEART RATE: 68 BPM | TEMPERATURE: 98 F | BODY MASS INDEX: 23.81 KG/M2 | DIASTOLIC BLOOD PRESSURE: 65 MMHG | HEIGHT: 66 IN | WEIGHT: 148.13 LBS | SYSTOLIC BLOOD PRESSURE: 133 MMHG

## 2021-01-01 VITALS
TEMPERATURE: 101 F | OXYGEN SATURATION: 95 % | SYSTOLIC BLOOD PRESSURE: 144 MMHG | WEIGHT: 116.63 LBS | RESPIRATION RATE: 18 BRPM | HEIGHT: 67 IN | DIASTOLIC BLOOD PRESSURE: 67 MMHG | BODY MASS INDEX: 18.3 KG/M2 | HEART RATE: 91 BPM

## 2021-01-01 DIAGNOSIS — R07.9 CHEST PAIN: ICD-10-CM

## 2021-01-01 DIAGNOSIS — I50.33 ACUTE ON CHRONIC DIASTOLIC (CONGESTIVE) HEART FAILURE: Primary | ICD-10-CM

## 2021-01-01 DIAGNOSIS — I10 HYPERTENSION: ICD-10-CM

## 2021-01-01 DIAGNOSIS — N18.30 CKD (CHRONIC KIDNEY DISEASE) STAGE 3, GFR 30-59 ML/MIN: ICD-10-CM

## 2021-01-01 DIAGNOSIS — S81.812A LACERATION OF LEFT LOWER EXTREMITY, INITIAL ENCOUNTER: ICD-10-CM

## 2021-01-01 DIAGNOSIS — A41.9 SEPSIS, DUE TO UNSPECIFIED ORGANISM, UNSPECIFIED WHETHER ACUTE ORGAN DYSFUNCTION PRESENT: Primary | ICD-10-CM

## 2021-01-01 DIAGNOSIS — I87.8 VENOUS STASIS: ICD-10-CM

## 2021-01-01 DIAGNOSIS — U07.1 PNEUMONIA DUE TO COVID-19 VIRUS: ICD-10-CM

## 2021-01-01 DIAGNOSIS — N17.9 AKI (ACUTE KIDNEY INJURY): ICD-10-CM

## 2021-01-01 DIAGNOSIS — R53.1 WEAKNESS: ICD-10-CM

## 2021-01-01 DIAGNOSIS — B95.61 STAPHYLOCOCCUS AUREUS BACTEREMIA: ICD-10-CM

## 2021-01-01 DIAGNOSIS — E87.0 HYPERNATREMIA: ICD-10-CM

## 2021-01-01 DIAGNOSIS — R06.02 SHORTNESS OF BREATH: ICD-10-CM

## 2021-01-01 DIAGNOSIS — R78.81 STAPHYLOCOCCUS AUREUS BACTEREMIA: ICD-10-CM

## 2021-01-01 DIAGNOSIS — I50.20 SYSTOLIC CONGESTIVE HEART FAILURE, UNSPECIFIED HF CHRONICITY: ICD-10-CM

## 2021-01-01 DIAGNOSIS — I48.91 ATRIAL FIBRILLATION, UNSPECIFIED TYPE: ICD-10-CM

## 2021-01-01 DIAGNOSIS — I48.91 ATRIAL FIBRILLATION: ICD-10-CM

## 2021-01-01 DIAGNOSIS — A41.9 SEPSIS, DUE TO UNSPECIFIED ORGANISM, UNSPECIFIED WHETHER ACUTE ORGAN DYSFUNCTION PRESENT: ICD-10-CM

## 2021-01-01 DIAGNOSIS — J96.01 ACUTE HYPOXEMIC RESPIRATORY FAILURE DUE TO COVID-19: ICD-10-CM

## 2021-01-01 DIAGNOSIS — F03.90 DEMENTIA WITHOUT BEHAVIORAL DISTURBANCE, UNSPECIFIED DEMENTIA TYPE: ICD-10-CM

## 2021-01-01 DIAGNOSIS — I48.20 CHRONIC A-FIB: ICD-10-CM

## 2021-01-01 DIAGNOSIS — I63.9 STROKE: ICD-10-CM

## 2021-01-01 DIAGNOSIS — G30.9 ALZHEIMER'S DEMENTIA: ICD-10-CM

## 2021-01-01 DIAGNOSIS — R60.9 SWELLING: ICD-10-CM

## 2021-01-01 DIAGNOSIS — U07.1 COVID-19 VIRUS DETECTED: ICD-10-CM

## 2021-01-01 DIAGNOSIS — I48.20 CHRONIC ATRIAL FIBRILLATION: ICD-10-CM

## 2021-01-01 DIAGNOSIS — D63.8 ANEMIA OF CHRONIC DISEASE: ICD-10-CM

## 2021-01-01 DIAGNOSIS — U07.1 ACUTE HYPOXEMIC RESPIRATORY FAILURE DUE TO COVID-19: ICD-10-CM

## 2021-01-01 DIAGNOSIS — J12.82 PNEUMONIA DUE TO COVID-19 VIRUS: ICD-10-CM

## 2021-01-01 DIAGNOSIS — F02.80 ALZHEIMER'S DEMENTIA: ICD-10-CM

## 2021-01-01 DIAGNOSIS — R60.1 ANASARCA: ICD-10-CM

## 2021-01-01 DIAGNOSIS — F03.90 DEMENTIA: ICD-10-CM

## 2021-01-01 DIAGNOSIS — I50.9 ACUTE HEART FAILURE: ICD-10-CM

## 2021-01-01 DIAGNOSIS — Z20.822 SUSPECTED COVID-19 VIRUS INFECTION: ICD-10-CM

## 2021-01-01 LAB
ABO + RH BLD: NORMAL
ALBUMIN SERPL BCP-MCNC: 1.9 G/DL (ref 3.5–5.2)
ALBUMIN SERPL BCP-MCNC: 2.1 G/DL (ref 3.5–5.2)
ALBUMIN SERPL BCP-MCNC: 2.2 G/DL (ref 3.5–5.2)
ALBUMIN SERPL BCP-MCNC: 2.4 G/DL (ref 3.5–5.2)
ALBUMIN SERPL BCP-MCNC: 2.5 G/DL (ref 3.5–5.2)
ALBUMIN SERPL BCP-MCNC: 2.6 G/DL (ref 3.5–5.2)
ALBUMIN SERPL BCP-MCNC: 2.7 G/DL (ref 3.5–5.2)
ALBUMIN SERPL BCP-MCNC: 2.8 G/DL (ref 3.5–5.2)
ALBUMIN SERPL BCP-MCNC: 2.9 G/DL (ref 3.5–5.2)
ALLENS TEST: ABNORMAL
ALP SERPL-CCNC: 105 U/L (ref 55–135)
ALP SERPL-CCNC: 109 U/L (ref 55–135)
ALP SERPL-CCNC: 114 U/L (ref 55–135)
ALP SERPL-CCNC: 116 U/L (ref 55–135)
ALP SERPL-CCNC: 117 U/L (ref 55–135)
ALP SERPL-CCNC: 120 U/L (ref 55–135)
ALP SERPL-CCNC: 123 U/L (ref 55–135)
ALP SERPL-CCNC: 131 U/L (ref 55–135)
ALP SERPL-CCNC: 132 U/L (ref 55–135)
ALP SERPL-CCNC: 132 U/L (ref 55–135)
ALP SERPL-CCNC: 140 U/L (ref 55–135)
ALP SERPL-CCNC: 143 U/L (ref 55–135)
ALP SERPL-CCNC: 145 U/L (ref 55–135)
ALP SERPL-CCNC: 82 U/L (ref 55–135)
ALP SERPL-CCNC: 83 U/L (ref 55–135)
ALP SERPL-CCNC: 85 U/L (ref 55–135)
ALP SERPL-CCNC: 88 U/L (ref 55–135)
ALT SERPL W/O P-5'-P-CCNC: 11 U/L (ref 10–44)
ALT SERPL W/O P-5'-P-CCNC: 14 U/L (ref 10–44)
ALT SERPL W/O P-5'-P-CCNC: 15 U/L (ref 10–44)
ALT SERPL W/O P-5'-P-CCNC: 16 U/L (ref 10–44)
ALT SERPL W/O P-5'-P-CCNC: 17 U/L (ref 10–44)
ALT SERPL W/O P-5'-P-CCNC: 18 U/L (ref 10–44)
ALT SERPL W/O P-5'-P-CCNC: 18 U/L (ref 10–44)
ALT SERPL W/O P-5'-P-CCNC: 19 U/L (ref 10–44)
ALT SERPL W/O P-5'-P-CCNC: 19 U/L (ref 10–44)
ALT SERPL W/O P-5'-P-CCNC: 20 U/L (ref 10–44)
ALT SERPL W/O P-5'-P-CCNC: 21 U/L (ref 10–44)
ALT SERPL W/O P-5'-P-CCNC: 22 U/L (ref 10–44)
ALT SERPL W/O P-5'-P-CCNC: 6 U/L (ref 10–44)
ALT SERPL W/O P-5'-P-CCNC: 6 U/L (ref 10–44)
ALT SERPL W/O P-5'-P-CCNC: 9 U/L (ref 10–44)
ALT SERPL W/O P-5'-P-CCNC: <5 U/L (ref 10–44)
ALT SERPL W/O P-5'-P-CCNC: <5 U/L (ref 10–44)
ANION GAP SERPL CALC-SCNC: 10 MMOL/L (ref 8–16)
ANION GAP SERPL CALC-SCNC: 10 MMOL/L (ref 8–16)
ANION GAP SERPL CALC-SCNC: 11 MMOL/L (ref 8–16)
ANION GAP SERPL CALC-SCNC: 12 MMOL/L (ref 8–16)
ANION GAP SERPL CALC-SCNC: 13 MMOL/L (ref 8–16)
ANION GAP SERPL CALC-SCNC: 14 MMOL/L (ref 8–16)
ANION GAP SERPL CALC-SCNC: 15 MMOL/L (ref 8–16)
ANION GAP SERPL CALC-SCNC: 7 MMOL/L (ref 8–16)
AORTIC ROOT ANNULUS: 3.34 CM
AORTIC VALVE CUSP SEPERATION: 1.78 CM
APTT PPP: 37.4 SEC (ref 23.6–33.3)
AST SERPL-CCNC: 103 U/L (ref 10–40)
AST SERPL-CCNC: 27 U/L (ref 10–40)
AST SERPL-CCNC: 28 U/L (ref 10–40)
AST SERPL-CCNC: 28 U/L (ref 10–40)
AST SERPL-CCNC: 29 U/L (ref 10–40)
AST SERPL-CCNC: 29 U/L (ref 10–40)
AST SERPL-CCNC: 31 U/L (ref 10–40)
AST SERPL-CCNC: 32 U/L (ref 10–40)
AST SERPL-CCNC: 32 U/L (ref 10–40)
AST SERPL-CCNC: 33 U/L (ref 10–40)
AST SERPL-CCNC: 39 U/L (ref 10–40)
AST SERPL-CCNC: 40 U/L (ref 10–40)
AST SERPL-CCNC: 44 U/L (ref 10–40)
AST SERPL-CCNC: 45 U/L (ref 10–40)
AST SERPL-CCNC: 46 U/L (ref 10–40)
AST SERPL-CCNC: 46 U/L (ref 10–40)
AST SERPL-CCNC: 55 U/L (ref 10–40)
AST SERPL-CCNC: 79 U/L (ref 10–40)
AST SERPL-CCNC: 91 U/L (ref 10–40)
AV INDEX (PROSTH): 0.83
AV MEAN GRADIENT: 3 MMHG
AV PEAK GRADIENT: 5 MMHG
AV VALVE AREA: 2.62 CM2
AV VELOCITY RATIO: 72.45
BACTERIA #/AREA URNS HPF: ABNORMAL /HPF
BACTERIA BLD CULT: ABNORMAL
BACTERIA BLD CULT: NORMAL
BACTERIA UR CULT: ABNORMAL
BACTERIA UR CULT: NORMAL
BACTERIA UR CULT: NORMAL
BASOPHILS # BLD AUTO: 0 K/UL (ref 0–0.2)
BASOPHILS # BLD AUTO: 0.01 K/UL (ref 0–0.2)
BASOPHILS # BLD AUTO: 0.01 K/UL (ref 0–0.2)
BASOPHILS # BLD AUTO: 0.02 K/UL (ref 0–0.2)
BASOPHILS # BLD AUTO: 0.02 K/UL (ref 0–0.2)
BASOPHILS # BLD AUTO: 0.05 K/UL (ref 0–0.2)
BASOPHILS # BLD AUTO: 0.06 K/UL (ref 0–0.2)
BASOPHILS # BLD AUTO: 0.06 K/UL (ref 0–0.2)
BASOPHILS # BLD AUTO: 0.07 K/UL (ref 0–0.2)
BASOPHILS # BLD AUTO: 0.09 K/UL (ref 0–0.2)
BASOPHILS NFR BLD: 0 % (ref 0–1.9)
BASOPHILS NFR BLD: 0.1 % (ref 0–1.9)
BASOPHILS NFR BLD: 0.2 % (ref 0–1.9)
BASOPHILS NFR BLD: 0.6 % (ref 0–1.9)
BASOPHILS NFR BLD: 0.7 % (ref 0–1.9)
BASOPHILS NFR BLD: 0.7 % (ref 0–1.9)
BASOPHILS NFR BLD: 0.8 % (ref 0–1.9)
BASOPHILS NFR BLD: 0.8 % (ref 0–1.9)
BASOPHILS NFR BLD: 0.9 % (ref 0–1.9)
BILIRUB SERPL-MCNC: 0.6 MG/DL (ref 0.1–1)
BILIRUB SERPL-MCNC: 0.6 MG/DL (ref 0.1–1)
BILIRUB SERPL-MCNC: 0.7 MG/DL (ref 0.1–1)
BILIRUB SERPL-MCNC: 0.7 MG/DL (ref 0.1–1)
BILIRUB SERPL-MCNC: 0.8 MG/DL (ref 0.1–1)
BILIRUB SERPL-MCNC: 0.8 MG/DL (ref 0.1–1)
BILIRUB SERPL-MCNC: 0.9 MG/DL (ref 0.1–1)
BILIRUB SERPL-MCNC: 0.9 MG/DL (ref 0.1–1)
BILIRUB SERPL-MCNC: 1 MG/DL (ref 0.1–1)
BILIRUB SERPL-MCNC: 1.1 MG/DL (ref 0.1–1)
BILIRUB SERPL-MCNC: 1.2 MG/DL (ref 0.1–1)
BILIRUB SERPL-MCNC: 1.3 MG/DL (ref 0.1–1)
BILIRUB SERPL-MCNC: 1.4 MG/DL (ref 0.1–1)
BILIRUB UR QL STRIP: NEGATIVE
BLD GP AB SCN CELLS X3 SERPL QL: NORMAL
BNP SERPL-MCNC: 1287 PG/ML (ref 0–99)
BNP SERPL-MCNC: 416 PG/ML (ref 0–99)
BNP SERPL-MCNC: 579 PG/ML (ref 0–99)
BNP SERPL-MCNC: 598 PG/ML (ref 0–99)
BNP SERPL-MCNC: 598 PG/ML (ref 0–99)
BNP SERPL-MCNC: 611 PG/ML (ref 0–99)
BNP SERPL-MCNC: 634 PG/ML (ref 0–99)
BNP SERPL-MCNC: 673 PG/ML (ref 0–99)
BNP SERPL-MCNC: 704 PG/ML (ref 0–99)
BNP SERPL-MCNC: 715 PG/ML (ref 0–99)
BNP SERPL-MCNC: 732 PG/ML (ref 0–99)
BNP SERPL-MCNC: 804 PG/ML (ref 0–99)
BSA FOR ECHO PROCEDURE: 1.88 M2
BUN SERPL-MCNC: 40 MG/DL (ref 10–30)
BUN SERPL-MCNC: 42 MG/DL (ref 10–30)
BUN SERPL-MCNC: 45 MG/DL (ref 10–30)
BUN SERPL-MCNC: 51 MG/DL (ref 10–30)
BUN SERPL-MCNC: 53 MG/DL (ref 10–30)
BUN SERPL-MCNC: 53 MG/DL (ref 10–30)
BUN SERPL-MCNC: 58 MG/DL (ref 10–30)
BUN SERPL-MCNC: 58 MG/DL (ref 10–30)
BUN SERPL-MCNC: 59 MG/DL (ref 10–30)
BUN SERPL-MCNC: 60 MG/DL (ref 10–30)
BUN SERPL-MCNC: 63 MG/DL (ref 10–30)
BUN SERPL-MCNC: 64 MG/DL (ref 10–30)
BUN SERPL-MCNC: 65 MG/DL (ref 10–30)
BUN SERPL-MCNC: 66 MG/DL (ref 10–30)
BUN SERPL-MCNC: 66 MG/DL (ref 10–30)
BUN SERPL-MCNC: 67 MG/DL (ref 10–30)
BUN SERPL-MCNC: 70 MG/DL (ref 10–30)
BUN SERPL-MCNC: 72 MG/DL (ref 10–30)
BUN SERPL-MCNC: 72 MG/DL (ref 10–30)
BUN SERPL-MCNC: 73 MG/DL (ref 10–30)
BUN SERPL-MCNC: 78 MG/DL (ref 10–30)
CALCIUM SERPL-MCNC: 8.5 MG/DL (ref 8.7–10.5)
CALCIUM SERPL-MCNC: 8.6 MG/DL (ref 8.7–10.5)
CALCIUM SERPL-MCNC: 8.7 MG/DL (ref 8.7–10.5)
CALCIUM SERPL-MCNC: 8.7 MG/DL (ref 8.7–10.5)
CALCIUM SERPL-MCNC: 8.8 MG/DL (ref 8.7–10.5)
CALCIUM SERPL-MCNC: 8.9 MG/DL (ref 8.7–10.5)
CALCIUM SERPL-MCNC: 9 MG/DL (ref 8.7–10.5)
CALCIUM SERPL-MCNC: 9.1 MG/DL (ref 8.7–10.5)
CALCIUM SERPL-MCNC: 9.1 MG/DL (ref 8.7–10.5)
CALCIUM SERPL-MCNC: 9.2 MG/DL (ref 8.7–10.5)
CALCIUM SERPL-MCNC: 9.4 MG/DL (ref 8.7–10.5)
CALCIUM SERPL-MCNC: 9.5 MG/DL (ref 8.7–10.5)
CALCIUM SERPL-MCNC: 9.5 MG/DL (ref 8.7–10.5)
CALCIUM SERPL-MCNC: 9.6 MG/DL (ref 8.7–10.5)
CHLORIDE SERPL-SCNC: 100 MMOL/L (ref 95–110)
CHLORIDE SERPL-SCNC: 107 MMOL/L (ref 95–110)
CHLORIDE SERPL-SCNC: 108 MMOL/L (ref 95–110)
CHLORIDE SERPL-SCNC: 90 MMOL/L (ref 95–110)
CHLORIDE SERPL-SCNC: 91 MMOL/L (ref 95–110)
CHLORIDE SERPL-SCNC: 92 MMOL/L (ref 95–110)
CHLORIDE SERPL-SCNC: 93 MMOL/L (ref 95–110)
CHLORIDE SERPL-SCNC: 94 MMOL/L (ref 95–110)
CHLORIDE SERPL-SCNC: 95 MMOL/L (ref 95–110)
CHLORIDE SERPL-SCNC: 96 MMOL/L (ref 95–110)
CHLORIDE SERPL-SCNC: 97 MMOL/L (ref 95–110)
CHLORIDE SERPL-SCNC: 98 MMOL/L (ref 95–110)
CHLORIDE SERPL-SCNC: 99 MMOL/L (ref 95–110)
CHOLEST SERPL-MCNC: 95 MG/DL (ref 120–199)
CHOLEST/HDLC SERPL: 2 {RATIO} (ref 2–5)
CK SERPL-CCNC: 19 U/L (ref 20–180)
CK SERPL-CCNC: 34 U/L (ref 20–180)
CLARITY UR: ABNORMAL
CO2 SERPL-SCNC: 27 MMOL/L (ref 23–29)
CO2 SERPL-SCNC: 28 MMOL/L (ref 23–29)
CO2 SERPL-SCNC: 28 MMOL/L (ref 23–29)
CO2 SERPL-SCNC: 29 MMOL/L (ref 23–29)
CO2 SERPL-SCNC: 30 MMOL/L (ref 23–29)
CO2 SERPL-SCNC: 32 MMOL/L (ref 23–29)
CO2 SERPL-SCNC: 32 MMOL/L (ref 23–29)
CO2 SERPL-SCNC: 33 MMOL/L (ref 23–29)
CO2 SERPL-SCNC: 34 MMOL/L (ref 23–29)
CO2 SERPL-SCNC: 35 MMOL/L (ref 23–29)
CO2 SERPL-SCNC: 37 MMOL/L (ref 23–29)
CO2 SERPL-SCNC: 38 MMOL/L (ref 23–29)
CO2 SERPL-SCNC: 39 MMOL/L (ref 23–29)
CO2 SERPL-SCNC: 40 MMOL/L (ref 23–29)
COLOR UR: YELLOW
CREAT SERPL-MCNC: 1.2 MG/DL (ref 0.5–1.4)
CREAT SERPL-MCNC: 1.2 MG/DL (ref 0.5–1.4)
CREAT SERPL-MCNC: 1.3 MG/DL (ref 0.5–1.4)
CREAT SERPL-MCNC: 1.6 MG/DL (ref 0.5–1.4)
CREAT SERPL-MCNC: 1.7 MG/DL (ref 0.5–1.4)
CREAT SERPL-MCNC: 1.8 MG/DL (ref 0.5–1.4)
CREAT SERPL-MCNC: 1.9 MG/DL (ref 0.5–1.4)
CREAT SERPL-MCNC: 2 MG/DL (ref 0.5–1.4)
CREAT SERPL-MCNC: 2 MG/DL (ref 0.5–1.4)
CRP SERPL-MCNC: 156.7 MG/L (ref 0–8.2)
CRP SERPL-MCNC: 21.6 MG/L (ref 0–8.2)
CRP SERPL-MCNC: 34.5 MG/L (ref 0–8.2)
CRP SERPL-MCNC: 40.2 MG/L (ref 0–8.2)
CV ECHO LV RWT: 1.03 CM
D DIMER PPP IA.FEU-MCNC: 1.14 MG/L FEU
D DIMER PPP IA.FEU-MCNC: 1.16 MG/L FEU
DELSYS: ABNORMAL
DIFFERENTIAL METHOD: ABNORMAL
DOP CALC AO PEAK VEL: 1.1 M/S
DOP CALC AO VTI: 23.24 CM
DOP CALC LVOT AREA: 3.1 CM2
DOP CALC LVOT DIAMETER: 2 CM
DOP CALC LVOT PEAK VEL: 79.69 M/S
DOP CALC LVOT STROKE VOLUME: 60.79 CM3
DOP CALCLVOT PEAK VEL VTI: 19.36 CM
E WAVE DECELERATION TIME: 241.6 MSEC
E/A RATIO: 3.72
E/E' RATIO: 8.86 M/S
ECHO LV POSTERIOR WALL: 1.75 CM (ref 0.6–1.1)
EOSINOPHIL # BLD AUTO: 0 K/UL (ref 0–0.5)
EOSINOPHIL # BLD AUTO: 0.1 K/UL (ref 0–0.5)
EOSINOPHIL # BLD AUTO: 0.2 K/UL (ref 0–0.5)
EOSINOPHIL # BLD AUTO: 0.2 K/UL (ref 0–0.5)
EOSINOPHIL # BLD AUTO: 0.3 K/UL (ref 0–0.5)
EOSINOPHIL # BLD AUTO: 0.4 K/UL (ref 0–0.5)
EOSINOPHIL NFR BLD: 0 % (ref 0–8)
EOSINOPHIL NFR BLD: 0.1 % (ref 0–8)
EOSINOPHIL NFR BLD: 0.4 % (ref 0–8)
EOSINOPHIL NFR BLD: 1.7 % (ref 0–8)
EOSINOPHIL NFR BLD: 2.7 % (ref 0–8)
EOSINOPHIL NFR BLD: 3.2 % (ref 0–8)
EOSINOPHIL NFR BLD: 3.7 % (ref 0–8)
EOSINOPHIL NFR BLD: 3.7 % (ref 0–8)
EOSINOPHIL NFR BLD: 4 % (ref 0–8)
EOSINOPHIL NFR BLD: 4.1 % (ref 0–8)
EOSINOPHIL NFR BLD: 4.6 % (ref 0–8)
ERYTHROCYTE [DISTWIDTH] IN BLOOD BY AUTOMATED COUNT: 15.3 % (ref 11.5–14.5)
ERYTHROCYTE [DISTWIDTH] IN BLOOD BY AUTOMATED COUNT: 15.3 % (ref 11.5–14.5)
ERYTHROCYTE [DISTWIDTH] IN BLOOD BY AUTOMATED COUNT: 15.4 % (ref 11.5–14.5)
ERYTHROCYTE [DISTWIDTH] IN BLOOD BY AUTOMATED COUNT: 15.5 % (ref 11.5–14.5)
ERYTHROCYTE [DISTWIDTH] IN BLOOD BY AUTOMATED COUNT: 15.5 % (ref 11.5–14.5)
ERYTHROCYTE [DISTWIDTH] IN BLOOD BY AUTOMATED COUNT: 15.6 % (ref 11.5–14.5)
ERYTHROCYTE [DISTWIDTH] IN BLOOD BY AUTOMATED COUNT: 15.8 % (ref 11.5–14.5)
ERYTHROCYTE [DISTWIDTH] IN BLOOD BY AUTOMATED COUNT: 15.9 % (ref 11.5–14.5)
ERYTHROCYTE [DISTWIDTH] IN BLOOD BY AUTOMATED COUNT: 16 % (ref 11.5–14.5)
ERYTHROCYTE [DISTWIDTH] IN BLOOD BY AUTOMATED COUNT: 16.1 % (ref 11.5–14.5)
ERYTHROCYTE [DISTWIDTH] IN BLOOD BY AUTOMATED COUNT: 16.2 % (ref 11.5–14.5)
ERYTHROCYTE [DISTWIDTH] IN BLOOD BY AUTOMATED COUNT: 16.3 % (ref 11.5–14.5)
ERYTHROCYTE [DISTWIDTH] IN BLOOD BY AUTOMATED COUNT: 16.5 % (ref 11.5–14.5)
ERYTHROCYTE [DISTWIDTH] IN BLOOD BY AUTOMATED COUNT: 16.8 % (ref 11.5–14.5)
ERYTHROCYTE [DISTWIDTH] IN BLOOD BY AUTOMATED COUNT: 16.8 % (ref 11.5–14.5)
ERYTHROCYTE [DISTWIDTH] IN BLOOD BY AUTOMATED COUNT: 16.9 % (ref 11.5–14.5)
ERYTHROCYTE [DISTWIDTH] IN BLOOD BY AUTOMATED COUNT: 17 % (ref 11.5–14.5)
ERYTHROCYTE [DISTWIDTH] IN BLOOD BY AUTOMATED COUNT: 18.3 % (ref 11.5–14.5)
ERYTHROCYTE [DISTWIDTH] IN BLOOD BY AUTOMATED COUNT: 18.5 % (ref 11.5–14.5)
EST. GFR  (AFRICAN AMERICAN): 24.3 ML/MIN/1.73 M^2
EST. GFR  (AFRICAN AMERICAN): 24.3 ML/MIN/1.73 M^2
EST. GFR  (AFRICAN AMERICAN): 25.8 ML/MIN/1.73 M^2
EST. GFR  (AFRICAN AMERICAN): 27.6 ML/MIN/1.73 M^2
EST. GFR  (AFRICAN AMERICAN): 29.5 ML/MIN/1.73 M^2
EST. GFR  (AFRICAN AMERICAN): 31.8 ML/MIN/1.73 M^2
EST. GFR  (AFRICAN AMERICAN): 32 ML/MIN/1.73 M^2
EST. GFR  (AFRICAN AMERICAN): 41 ML/MIN/1.73 M^2
EST. GFR  (AFRICAN AMERICAN): 45 ML/MIN/1.73 M^2
EST. GFR  (AFRICAN AMERICAN): 45 ML/MIN/1.73 M^2
EST. GFR  (NON AFRICAN AMERICAN): 21.1 ML/MIN/1.73 M^2
EST. GFR  (NON AFRICAN AMERICAN): 21.1 ML/MIN/1.73 M^2
EST. GFR  (NON AFRICAN AMERICAN): 22.4 ML/MIN/1.73 M^2
EST. GFR  (NON AFRICAN AMERICAN): 23.9 ML/MIN/1.73 M^2
EST. GFR  (NON AFRICAN AMERICAN): 25.6 ML/MIN/1.73 M^2
EST. GFR  (NON AFRICAN AMERICAN): 27 ML/MIN/1.73 M^2
EST. GFR  (NON AFRICAN AMERICAN): 27 ML/MIN/1.73 M^2
EST. GFR  (NON AFRICAN AMERICAN): 27.6 ML/MIN/1.73 M^2
EST. GFR  (NON AFRICAN AMERICAN): 28 ML/MIN/1.73 M^2
EST. GFR  (NON AFRICAN AMERICAN): 35 ML/MIN/1.73 M^2
EST. GFR  (NON AFRICAN AMERICAN): 39 ML/MIN/1.73 M^2
EST. GFR  (NON AFRICAN AMERICAN): 39 ML/MIN/1.73 M^2
FERRITIN SERPL-MCNC: 126 NG/ML (ref 20–300)
FERRITIN SERPL-MCNC: 135 NG/ML (ref 20–300)
FERRITIN SERPL-MCNC: 143 NG/ML (ref 20–300)
FERRITIN SERPL-MCNC: 656 NG/ML (ref 20–300)
FLOW: 2
FRACTIONAL SHORTENING: 29 % (ref 28–44)
GLUCOSE SERPL-MCNC: 100 MG/DL (ref 70–110)
GLUCOSE SERPL-MCNC: 101 MG/DL (ref 70–110)
GLUCOSE SERPL-MCNC: 103 MG/DL (ref 70–110)
GLUCOSE SERPL-MCNC: 104 MG/DL (ref 70–110)
GLUCOSE SERPL-MCNC: 107 MG/DL (ref 70–110)
GLUCOSE SERPL-MCNC: 109 MG/DL (ref 70–110)
GLUCOSE SERPL-MCNC: 110 MG/DL (ref 70–110)
GLUCOSE SERPL-MCNC: 111 MG/DL (ref 70–110)
GLUCOSE SERPL-MCNC: 113 MG/DL (ref 70–110)
GLUCOSE SERPL-MCNC: 117 MG/DL (ref 70–110)
GLUCOSE SERPL-MCNC: 123 MG/DL (ref 70–110)
GLUCOSE SERPL-MCNC: 123 MG/DL (ref 70–110)
GLUCOSE SERPL-MCNC: 127 MG/DL (ref 70–110)
GLUCOSE SERPL-MCNC: 129 MG/DL (ref 70–110)
GLUCOSE SERPL-MCNC: 132 MG/DL (ref 70–110)
GLUCOSE SERPL-MCNC: 132 MG/DL (ref 70–110)
GLUCOSE SERPL-MCNC: 141 MG/DL (ref 70–110)
GLUCOSE SERPL-MCNC: 141 MG/DL (ref 70–110)
GLUCOSE SERPL-MCNC: 91 MG/DL (ref 70–110)
GLUCOSE SERPL-MCNC: 93 MG/DL (ref 70–110)
GLUCOSE SERPL-MCNC: 95 MG/DL (ref 70–110)
GLUCOSE SERPL-MCNC: 97 MG/DL (ref 70–110)
GLUCOSE SERPL-MCNC: 98 MG/DL (ref 70–110)
GLUCOSE SERPL-MCNC: 99 MG/DL (ref 70–110)
GLUCOSE UR QL STRIP: NEGATIVE
HCO3 UR-SCNC: 38.9 MMOL/L (ref 24–28)
HCT VFR BLD AUTO: 31.4 % (ref 37–48.5)
HCT VFR BLD AUTO: 31.9 % (ref 37–48.5)
HCT VFR BLD AUTO: 32.6 % (ref 37–48.5)
HCT VFR BLD AUTO: 32.8 % (ref 37–48.5)
HCT VFR BLD AUTO: 33.7 % (ref 37–48.5)
HCT VFR BLD AUTO: 33.8 % (ref 37–48.5)
HCT VFR BLD AUTO: 34.4 % (ref 37–48.5)
HCT VFR BLD AUTO: 34.4 % (ref 37–48.5)
HCT VFR BLD AUTO: 34.6 % (ref 37–48.5)
HCT VFR BLD AUTO: 34.6 % (ref 37–48.5)
HCT VFR BLD AUTO: 34.7 % (ref 37–48.5)
HCT VFR BLD AUTO: 34.8 % (ref 37–48.5)
HCT VFR BLD AUTO: 35 % (ref 37–48.5)
HCT VFR BLD AUTO: 35.3 % (ref 37–48.5)
HCT VFR BLD AUTO: 35.6 % (ref 37–48.5)
HCT VFR BLD AUTO: 35.6 % (ref 37–48.5)
HCT VFR BLD AUTO: 35.7 % (ref 37–48.5)
HCT VFR BLD AUTO: 36.2 % (ref 37–48.5)
HCT VFR BLD AUTO: 37.1 % (ref 37–48.5)
HCT VFR BLD AUTO: 37.2 % (ref 37–48.5)
HCT VFR BLD AUTO: 37.7 % (ref 37–48.5)
HCT VFR BLD AUTO: 39.8 % (ref 37–48.5)
HDLC SERPL-MCNC: 48 MG/DL (ref 40–75)
HDLC SERPL: 50.5 % (ref 20–50)
HGB BLD-MCNC: 10.1 G/DL (ref 12–16)
HGB BLD-MCNC: 10.1 G/DL (ref 12–16)
HGB BLD-MCNC: 10.5 G/DL (ref 12–16)
HGB BLD-MCNC: 10.5 G/DL (ref 12–16)
HGB BLD-MCNC: 10.6 G/DL (ref 12–16)
HGB BLD-MCNC: 10.7 G/DL (ref 12–16)
HGB BLD-MCNC: 10.7 G/DL (ref 12–16)
HGB BLD-MCNC: 10.8 G/DL (ref 12–16)
HGB BLD-MCNC: 10.9 G/DL (ref 12–16)
HGB BLD-MCNC: 10.9 G/DL (ref 12–16)
HGB BLD-MCNC: 11 G/DL (ref 12–16)
HGB BLD-MCNC: 11 G/DL (ref 12–16)
HGB BLD-MCNC: 11.1 G/DL (ref 12–16)
HGB BLD-MCNC: 11.4 G/DL (ref 12–16)
HGB BLD-MCNC: 11.7 G/DL (ref 12–16)
HGB BLD-MCNC: 12 G/DL (ref 12–16)
HGB BLD-MCNC: 9.6 G/DL (ref 12–16)
HGB BLD-MCNC: 9.9 G/DL (ref 12–16)
HGB UR QL STRIP: ABNORMAL
IMM GRANULOCYTES # BLD AUTO: 0.01 K/UL (ref 0–0.04)
IMM GRANULOCYTES # BLD AUTO: 0.02 K/UL (ref 0–0.04)
IMM GRANULOCYTES # BLD AUTO: 0.03 K/UL (ref 0–0.04)
IMM GRANULOCYTES # BLD AUTO: 0.05 K/UL (ref 0–0.04)
IMM GRANULOCYTES # BLD AUTO: 0.15 K/UL (ref 0–0.04)
IMM GRANULOCYTES # BLD AUTO: 0.18 K/UL (ref 0–0.04)
IMM GRANULOCYTES NFR BLD AUTO: 0.2 % (ref 0–0.5)
IMM GRANULOCYTES NFR BLD AUTO: 0.2 % (ref 0–0.5)
IMM GRANULOCYTES NFR BLD AUTO: 0.3 % (ref 0–0.5)
IMM GRANULOCYTES NFR BLD AUTO: 0.4 % (ref 0–0.5)
IMM GRANULOCYTES NFR BLD AUTO: 0.5 % (ref 0–0.5)
IMM GRANULOCYTES NFR BLD AUTO: 0.8 % (ref 0–0.5)
IMM GRANULOCYTES NFR BLD AUTO: 1 % (ref 0–0.5)
INR PPP: 1.4 (ref 0.8–1.2)
INR PPP: 1.8
INTERVENTRICULAR SEPTUM: 1.75 CM (ref 0.6–1.1)
IVRT: 70.45 MSEC
KETONES UR QL STRIP: NEGATIVE
LACTATE SERPL-SCNC: 0.9 MMOL/L (ref 0.5–1.9)
LACTATE SERPL-SCNC: 1 MMOL/L (ref 0.5–2.2)
LACTATE SERPL-SCNC: 1.6 MMOL/L (ref 0.5–2.2)
LDH SERPL L TO P-CCNC: 262 U/L (ref 110–260)
LDH SERPL L TO P-CCNC: 405 U/L (ref 110–260)
LDH SERPL L TO P-CCNC: 431 U/L (ref 110–260)
LDH SERPL L TO P-CCNC: 443 U/L (ref 110–260)
LDLC SERPL CALC-MCNC: 37.2 MG/DL (ref 63–159)
LEFT ATRIUM SIZE: 4.02 CM
LEFT INTERNAL DIMENSION IN SYSTOLE: 2.42 CM (ref 2.1–4)
LEFT VENTRICLE MASS INDEX: 130 G/M2
LEFT VENTRICULAR INTERNAL DIMENSION IN DIASTOLE: 3.4 CM (ref 3.5–6)
LEFT VENTRICULAR MASS: 241.22 G
LEUKOCYTE ESTERASE UR QL STRIP: ABNORMAL
LV LATERAL E/E' RATIO: 7.75 M/S
LV SEPTAL E/E' RATIO: 10.33 M/S
LYMPHOCYTES # BLD AUTO: 0.5 K/UL (ref 1–4.8)
LYMPHOCYTES # BLD AUTO: 0.6 K/UL (ref 1–4.8)
LYMPHOCYTES # BLD AUTO: 0.7 K/UL (ref 1–4.8)
LYMPHOCYTES # BLD AUTO: 0.7 K/UL (ref 1–4.8)
LYMPHOCYTES # BLD AUTO: 0.8 K/UL (ref 1–4.8)
LYMPHOCYTES # BLD AUTO: 0.8 K/UL (ref 1–4.8)
LYMPHOCYTES # BLD AUTO: 0.9 K/UL (ref 1–4.8)
LYMPHOCYTES # BLD AUTO: 1.1 K/UL (ref 1–4.8)
LYMPHOCYTES # BLD AUTO: 1.2 K/UL (ref 1–4.8)
LYMPHOCYTES NFR BLD: 10.1 % (ref 18–48)
LYMPHOCYTES NFR BLD: 11.1 % (ref 18–48)
LYMPHOCYTES NFR BLD: 11.3 % (ref 18–48)
LYMPHOCYTES NFR BLD: 11.4 % (ref 18–48)
LYMPHOCYTES NFR BLD: 12.5 % (ref 18–48)
LYMPHOCYTES NFR BLD: 12.5 % (ref 18–48)
LYMPHOCYTES NFR BLD: 12.8 % (ref 18–48)
LYMPHOCYTES NFR BLD: 3.5 % (ref 18–48)
LYMPHOCYTES NFR BLD: 4.1 % (ref 18–48)
LYMPHOCYTES NFR BLD: 5.4 % (ref 18–48)
LYMPHOCYTES NFR BLD: 7.4 % (ref 18–48)
LYMPHOCYTES NFR BLD: 8.2 % (ref 18–48)
LYMPHOCYTES NFR BLD: 9.6 % (ref 18–48)
MAGNESIUM SERPL-MCNC: 1.9 MG/DL (ref 1.6–2.6)
MAGNESIUM SERPL-MCNC: 2 MG/DL (ref 1.6–2.6)
MAGNESIUM SERPL-MCNC: 2 MG/DL (ref 1.6–2.6)
MAGNESIUM SERPL-MCNC: 2.1 MG/DL (ref 1.6–2.6)
MAGNESIUM SERPL-MCNC: 2.1 MG/DL (ref 1.6–2.6)
MAGNESIUM SERPL-MCNC: 2.2 MG/DL (ref 1.6–2.6)
MAGNESIUM SERPL-MCNC: 2.2 MG/DL (ref 1.6–2.6)
MAGNESIUM SERPL-MCNC: 2.3 MG/DL (ref 1.6–2.6)
MAGNESIUM SERPL-MCNC: 2.4 MG/DL (ref 1.6–2.6)
MAGNESIUM SERPL-MCNC: 2.5 MG/DL (ref 1.6–2.6)
MAGNESIUM SERPL-MCNC: 2.8 MG/DL (ref 1.6–2.6)
MAGNESIUM SERPL-MCNC: 2.9 MG/DL (ref 1.6–2.6)
MCH RBC QN AUTO: 28 PG (ref 27–31)
MCH RBC QN AUTO: 28.3 PG (ref 27–31)
MCH RBC QN AUTO: 28.3 PG (ref 27–31)
MCH RBC QN AUTO: 28.7 PG (ref 27–31)
MCH RBC QN AUTO: 28.7 PG (ref 27–31)
MCH RBC QN AUTO: 28.9 PG (ref 27–31)
MCH RBC QN AUTO: 29.1 PG (ref 27–31)
MCH RBC QN AUTO: 29.2 PG (ref 27–31)
MCH RBC QN AUTO: 29.3 PG (ref 27–31)
MCH RBC QN AUTO: 29.4 PG (ref 27–31)
MCH RBC QN AUTO: 29.4 PG (ref 27–31)
MCH RBC QN AUTO: 29.5 PG (ref 27–31)
MCH RBC QN AUTO: 29.6 PG (ref 27–31)
MCH RBC QN AUTO: 29.7 PG (ref 27–31)
MCH RBC QN AUTO: 29.7 PG (ref 27–31)
MCH RBC QN AUTO: 29.8 PG (ref 27–31)
MCH RBC QN AUTO: 29.8 PG (ref 27–31)
MCH RBC QN AUTO: 30 PG (ref 27–31)
MCHC RBC AUTO-ENTMCNC: 29.6 G/DL (ref 32–36)
MCHC RBC AUTO-ENTMCNC: 29.8 G/DL (ref 32–36)
MCHC RBC AUTO-ENTMCNC: 30.1 G/DL (ref 32–36)
MCHC RBC AUTO-ENTMCNC: 30.2 G/DL (ref 32–36)
MCHC RBC AUTO-ENTMCNC: 30.2 G/DL (ref 32–36)
MCHC RBC AUTO-ENTMCNC: 30.3 G/DL (ref 32–36)
MCHC RBC AUTO-ENTMCNC: 30.5 G/DL (ref 32–36)
MCHC RBC AUTO-ENTMCNC: 30.6 G/DL (ref 32–36)
MCHC RBC AUTO-ENTMCNC: 30.6 G/DL (ref 32–36)
MCHC RBC AUTO-ENTMCNC: 30.8 G/DL (ref 32–36)
MCHC RBC AUTO-ENTMCNC: 30.8 G/DL (ref 32–36)
MCHC RBC AUTO-ENTMCNC: 30.9 G/DL (ref 32–36)
MCHC RBC AUTO-ENTMCNC: 30.9 G/DL (ref 32–36)
MCHC RBC AUTO-ENTMCNC: 31 G/DL (ref 32–36)
MCHC RBC AUTO-ENTMCNC: 31 G/DL (ref 32–36)
MCHC RBC AUTO-ENTMCNC: 31.1 G/DL (ref 32–36)
MCHC RBC AUTO-ENTMCNC: 31.1 G/DL (ref 32–36)
MCHC RBC AUTO-ENTMCNC: 31.2 G/DL (ref 32–36)
MCHC RBC AUTO-ENTMCNC: 31.2 G/DL (ref 32–36)
MCHC RBC AUTO-ENTMCNC: 31.4 G/DL (ref 32–36)
MCHC RBC AUTO-ENTMCNC: 31.5 G/DL (ref 32–36)
MCHC RBC AUTO-ENTMCNC: 31.5 G/DL (ref 32–36)
MCV RBC AUTO: 92 FL (ref 82–98)
MCV RBC AUTO: 93 FL (ref 82–98)
MCV RBC AUTO: 94 FL (ref 82–98)
MCV RBC AUTO: 95 FL (ref 82–98)
MCV RBC AUTO: 96 FL (ref 82–98)
MICROSCOPIC COMMENT: ABNORMAL
MODE: ABNORMAL
MONOCYTES # BLD AUTO: 0.3 K/UL (ref 0.3–1)
MONOCYTES # BLD AUTO: 0.3 K/UL (ref 0.3–1)
MONOCYTES # BLD AUTO: 0.6 K/UL (ref 0.3–1)
MONOCYTES # BLD AUTO: 0.7 K/UL (ref 0.3–1)
MONOCYTES # BLD AUTO: 0.8 K/UL (ref 0.3–1)
MONOCYTES # BLD AUTO: 0.8 K/UL (ref 0.3–1)
MONOCYTES # BLD AUTO: 0.9 K/UL (ref 0.3–1)
MONOCYTES # BLD AUTO: 0.9 K/UL (ref 0.3–1)
MONOCYTES # BLD AUTO: 1 K/UL (ref 0.3–1)
MONOCYTES # BLD AUTO: 1 K/UL (ref 0.3–1)
MONOCYTES # BLD AUTO: 1.1 K/UL (ref 0.3–1)
MONOCYTES # BLD AUTO: 1.2 K/UL (ref 0.3–1)
MONOCYTES # BLD AUTO: 1.3 K/UL (ref 0.3–1)
MONOCYTES NFR BLD: 10.2 % (ref 4–15)
MONOCYTES NFR BLD: 11.3 % (ref 4–15)
MONOCYTES NFR BLD: 11.3 % (ref 4–15)
MONOCYTES NFR BLD: 12.8 % (ref 4–15)
MONOCYTES NFR BLD: 14.8 % (ref 4–15)
MONOCYTES NFR BLD: 14.9 % (ref 4–15)
MONOCYTES NFR BLD: 3.8 % (ref 4–15)
MONOCYTES NFR BLD: 4.1 % (ref 4–15)
MONOCYTES NFR BLD: 5.3 % (ref 4–15)
MONOCYTES NFR BLD: 5.9 % (ref 4–15)
MONOCYTES NFR BLD: 6 % (ref 4–15)
MONOCYTES NFR BLD: 8.7 % (ref 4–15)
MONOCYTES NFR BLD: 9.6 % (ref 4–15)
MV PEAK A VEL: 0.25 M/S
MV PEAK E VEL: 0.93 M/S
NEUTROPHILS # BLD AUTO: 15.7 K/UL (ref 1.8–7.7)
NEUTROPHILS # BLD AUTO: 16.8 K/UL (ref 1.8–7.7)
NEUTROPHILS # BLD AUTO: 4.6 K/UL (ref 1.8–7.7)
NEUTROPHILS # BLD AUTO: 4.6 K/UL (ref 1.8–7.7)
NEUTROPHILS # BLD AUTO: 5.5 K/UL (ref 1.8–7.7)
NEUTROPHILS # BLD AUTO: 5.6 K/UL (ref 1.8–7.7)
NEUTROPHILS # BLD AUTO: 6.1 K/UL (ref 1.8–7.7)
NEUTROPHILS # BLD AUTO: 6.3 K/UL (ref 1.8–7.7)
NEUTROPHILS # BLD AUTO: 6.3 K/UL (ref 1.8–7.7)
NEUTROPHILS # BLD AUTO: 6.4 K/UL (ref 1.8–7.7)
NEUTROPHILS # BLD AUTO: 7 K/UL (ref 1.8–7.7)
NEUTROPHILS # BLD AUTO: 7.2 K/UL (ref 1.8–7.7)
NEUTROPHILS # BLD AUTO: 8.4 K/UL (ref 1.8–7.7)
NEUTROPHILS NFR BLD: 68.4 % (ref 38–73)
NEUTROPHILS NFR BLD: 68.9 % (ref 38–73)
NEUTROPHILS NFR BLD: 71.1 % (ref 38–73)
NEUTROPHILS NFR BLD: 71.2 % (ref 38–73)
NEUTROPHILS NFR BLD: 72.7 % (ref 38–73)
NEUTROPHILS NFR BLD: 74.9 % (ref 38–73)
NEUTROPHILS NFR BLD: 75.4 % (ref 38–73)
NEUTROPHILS NFR BLD: 80.4 % (ref 38–73)
NEUTROPHILS NFR BLD: 82.8 % (ref 38–73)
NEUTROPHILS NFR BLD: 87.7 % (ref 38–73)
NEUTROPHILS NFR BLD: 88.1 % (ref 38–73)
NEUTROPHILS NFR BLD: 88.9 % (ref 38–73)
NEUTROPHILS NFR BLD: 90.9 % (ref 38–73)
NITRITE UR QL STRIP: POSITIVE
NONHDLC SERPL-MCNC: 47 MG/DL
NRBC BLD-RTO: 0 /100 WBC
PCO2 BLDA: 46.6 MMHG (ref 35–45)
PH SMN: 7.53 [PH] (ref 7.35–7.45)
PH UR STRIP: 6 [PH] (ref 5–8)
PHOSPHATE SERPL-MCNC: 2.7 MG/DL (ref 2.7–4.5)
PHOSPHATE SERPL-MCNC: 3 MG/DL (ref 2.7–4.5)
PHOSPHATE SERPL-MCNC: 3.2 MG/DL (ref 2.7–4.5)
PHOSPHATE SERPL-MCNC: 3.2 MG/DL (ref 2.7–4.5)
PHOSPHATE SERPL-MCNC: 3.3 MG/DL (ref 2.7–4.5)
PISA TR MAX VEL: 2.58 M/S
PLATELET # BLD AUTO: 141 K/UL (ref 150–350)
PLATELET # BLD AUTO: 156 K/UL (ref 150–350)
PLATELET # BLD AUTO: 167 K/UL (ref 150–350)
PLATELET # BLD AUTO: 177 K/UL (ref 150–350)
PLATELET # BLD AUTO: 183 K/UL (ref 150–350)
PLATELET # BLD AUTO: 191 K/UL (ref 150–350)
PLATELET # BLD AUTO: 194 K/UL (ref 150–350)
PLATELET # BLD AUTO: 198 K/UL (ref 150–350)
PLATELET # BLD AUTO: 199 K/UL (ref 150–350)
PLATELET # BLD AUTO: 204 K/UL (ref 150–350)
PLATELET # BLD AUTO: 205 K/UL (ref 150–350)
PLATELET # BLD AUTO: 208 K/UL (ref 150–350)
PLATELET # BLD AUTO: 214 K/UL (ref 150–350)
PLATELET # BLD AUTO: 216 K/UL (ref 150–350)
PLATELET # BLD AUTO: 217 K/UL (ref 150–350)
PLATELET # BLD AUTO: 229 K/UL (ref 150–350)
PLATELET # BLD AUTO: 229 K/UL (ref 150–350)
PLATELET # BLD AUTO: 230 K/UL (ref 150–350)
PLATELET # BLD AUTO: 232 K/UL (ref 150–350)
PLATELET # BLD AUTO: 233 K/UL (ref 150–350)
PLATELET # BLD AUTO: 236 K/UL (ref 150–350)
PLATELET # BLD AUTO: 249 K/UL (ref 150–350)
PMV BLD AUTO: 10.3 FL (ref 9.2–12.9)
PMV BLD AUTO: 10.4 FL (ref 9.2–12.9)
PMV BLD AUTO: 10.6 FL (ref 9.2–12.9)
PMV BLD AUTO: 10.7 FL (ref 9.2–12.9)
PMV BLD AUTO: 11 FL (ref 9.2–12.9)
PMV BLD AUTO: 11.1 FL (ref 9.2–12.9)
PMV BLD AUTO: 11.2 FL (ref 9.2–12.9)
PMV BLD AUTO: 11.3 FL (ref 9.2–12.9)
PMV BLD AUTO: 11.4 FL (ref 9.2–12.9)
PMV BLD AUTO: 11.4 FL (ref 9.2–12.9)
PMV BLD AUTO: 11.5 FL (ref 9.2–12.9)
PMV BLD AUTO: 11.7 FL (ref 9.2–12.9)
PMV BLD AUTO: 12.6 FL (ref 9.2–12.9)
PO2 BLDA: 56 MMHG (ref 80–100)
POC BE: 16 MMOL/L
POC SATURATED O2: 91 % (ref 95–100)
POC TCO2: 40 MMOL/L (ref 23–27)
POTASSIUM SERPL-SCNC: 3.1 MMOL/L (ref 3.5–5.1)
POTASSIUM SERPL-SCNC: 3.1 MMOL/L (ref 3.5–5.1)
POTASSIUM SERPL-SCNC: 3.2 MMOL/L (ref 3.5–5.1)
POTASSIUM SERPL-SCNC: 3.3 MMOL/L (ref 3.5–5.1)
POTASSIUM SERPL-SCNC: 3.4 MMOL/L (ref 3.5–5.1)
POTASSIUM SERPL-SCNC: 3.5 MMOL/L (ref 3.5–5.1)
POTASSIUM SERPL-SCNC: 3.6 MMOL/L (ref 3.5–5.1)
POTASSIUM SERPL-SCNC: 3.8 MMOL/L (ref 3.5–5.1)
POTASSIUM SERPL-SCNC: 3.9 MMOL/L (ref 3.5–5.1)
POTASSIUM SERPL-SCNC: 4 MMOL/L (ref 3.5–5.1)
POTASSIUM SERPL-SCNC: 4.1 MMOL/L (ref 3.5–5.1)
POTASSIUM SERPL-SCNC: 4.2 MMOL/L (ref 3.5–5.1)
POTASSIUM SERPL-SCNC: 4.4 MMOL/L (ref 3.5–5.1)
POTASSIUM SERPL-SCNC: 4.8 MMOL/L (ref 3.5–5.1)
PROCALCITONIN SERPL IA-MCNC: 0.07 NG/ML
PROCALCITONIN SERPL IA-MCNC: 0.11 NG/ML (ref 0–0.5)
PROT SERPL-MCNC: 6 G/DL (ref 6–8.4)
PROT SERPL-MCNC: 6.1 G/DL (ref 6–8.4)
PROT SERPL-MCNC: 6.2 G/DL (ref 6–8.4)
PROT SERPL-MCNC: 6.3 G/DL (ref 6–8.4)
PROT SERPL-MCNC: 6.4 G/DL (ref 6–8.4)
PROT SERPL-MCNC: 6.5 G/DL (ref 6–8.4)
PROT SERPL-MCNC: 6.8 G/DL (ref 6–8.4)
PROT SERPL-MCNC: 6.9 G/DL (ref 6–8.4)
PROT SERPL-MCNC: 7 G/DL (ref 6–8.4)
PROT SERPL-MCNC: 7.1 G/DL (ref 6–8.4)
PROT SERPL-MCNC: 7.1 G/DL (ref 6–8.4)
PROT UR QL STRIP: ABNORMAL
PROTHROMBIN TIME: 14.6 SEC (ref 9–12.5)
PROTHROMBIN TIME: 20.2 SEC (ref 10.6–14.8)
PV PEAK VELOCITY: 103.89 CM/S
RA PRESSURE: 8 MMHG
RBC # BLD AUTO: 3.42 M/UL (ref 4–5.4)
RBC # BLD AUTO: 3.43 M/UL (ref 4–5.4)
RBC # BLD AUTO: 3.43 M/UL (ref 4–5.4)
RBC # BLD AUTO: 3.44 M/UL (ref 4–5.4)
RBC # BLD AUTO: 3.52 M/UL (ref 4–5.4)
RBC # BLD AUTO: 3.56 M/UL (ref 4–5.4)
RBC # BLD AUTO: 3.6 M/UL (ref 4–5.4)
RBC # BLD AUTO: 3.63 M/UL (ref 4–5.4)
RBC # BLD AUTO: 3.65 M/UL (ref 4–5.4)
RBC # BLD AUTO: 3.69 M/UL (ref 4–5.4)
RBC # BLD AUTO: 3.69 M/UL (ref 4–5.4)
RBC # BLD AUTO: 3.7 M/UL (ref 4–5.4)
RBC # BLD AUTO: 3.7 M/UL (ref 4–5.4)
RBC # BLD AUTO: 3.74 M/UL (ref 4–5.4)
RBC # BLD AUTO: 3.75 M/UL (ref 4–5.4)
RBC # BLD AUTO: 3.8 M/UL (ref 4–5.4)
RBC # BLD AUTO: 3.89 M/UL (ref 4–5.4)
RBC # BLD AUTO: 3.92 M/UL (ref 4–5.4)
RBC # BLD AUTO: 3.98 M/UL (ref 4–5.4)
RBC # BLD AUTO: 4.18 M/UL (ref 4–5.4)
RBC #/AREA URNS HPF: 4 /HPF (ref 0–4)
RIGHT VENTRICULAR END-DIASTOLIC DIMENSION: 346 CM
SAMPLE: ABNORMAL
SARS-COV-2 RDRP RESP QL NAA+PROBE: NEGATIVE
SARS-COV-2 RDRP RESP QL NAA+PROBE: NEGATIVE
SARS-COV-2 RDRP RESP QL NAA+PROBE: POSITIVE
SITE: ABNORMAL
SODIUM SERPL-SCNC: 137 MMOL/L (ref 136–145)
SODIUM SERPL-SCNC: 137 MMOL/L (ref 136–145)
SODIUM SERPL-SCNC: 138 MMOL/L (ref 136–145)
SODIUM SERPL-SCNC: 139 MMOL/L (ref 136–145)
SODIUM SERPL-SCNC: 139 MMOL/L (ref 136–145)
SODIUM SERPL-SCNC: 140 MMOL/L (ref 136–145)
SODIUM SERPL-SCNC: 141 MMOL/L (ref 136–145)
SODIUM SERPL-SCNC: 142 MMOL/L (ref 136–145)
SODIUM SERPL-SCNC: 143 MMOL/L (ref 136–145)
SODIUM SERPL-SCNC: 144 MMOL/L (ref 136–145)
SODIUM SERPL-SCNC: 153 MMOL/L (ref 136–145)
SODIUM SERPL-SCNC: 154 MMOL/L (ref 136–145)
SP GR UR STRIP: 1.02 (ref 1–1.03)
SQUAMOUS #/AREA URNS HPF: 3 /HPF
TDI LATERAL: 0.12 M/S
TDI SEPTAL: 0.09 M/S
TDI: 0.11 M/S
TR MAX PG: 27 MMHG
TRIGL SERPL-MCNC: 49 MG/DL (ref 30–150)
TROPONIN I SERPL DL<=0.01 NG/ML-MCNC: 0.2 NG/ML
TROPONIN I SERPL DL<=0.01 NG/ML-MCNC: 0.21 NG/ML
TROPONIN I SERPL DL<=0.01 NG/ML-MCNC: 0.22 NG/ML
TROPONIN I SERPL DL<=0.01 NG/ML-MCNC: 0.29 NG/ML (ref 0–0.03)
TROPONIN I SERPL DL<=0.01 NG/ML-MCNC: 0.29 NG/ML (ref 0–0.03)
TROPONIN I SERPL DL<=0.01 NG/ML-MCNC: 1.29 NG/ML (ref 0–0.03)
TSH SERPL DL<=0.005 MIU/L-ACNC: 0.96 UIU/ML (ref 0.34–5.6)
TV REST PULMONARY ARTERY PRESSURE: 35 MMHG
URN SPEC COLLECT METH UR: ABNORMAL
UROBILINOGEN UR STRIP-ACNC: NEGATIVE EU/DL
VANCOMYCIN SERPL-MCNC: 14.2 UG/ML
VANCOMYCIN SERPL-MCNC: 20 UG/ML
VANCOMYCIN SERPL-MCNC: 24.9 UG/ML
WBC # BLD AUTO: 10 K/UL (ref 3.9–12.7)
WBC # BLD AUTO: 17.69 K/UL (ref 3.9–12.7)
WBC # BLD AUTO: 18.49 K/UL (ref 3.9–12.7)
WBC # BLD AUTO: 5.49 K/UL (ref 3.9–12.7)
WBC # BLD AUTO: 5.95 K/UL (ref 3.9–12.7)
WBC # BLD AUTO: 6.12 K/UL (ref 3.9–12.7)
WBC # BLD AUTO: 6.33 K/UL (ref 3.9–12.7)
WBC # BLD AUTO: 6.47 K/UL (ref 3.9–12.7)
WBC # BLD AUTO: 6.82 K/UL (ref 3.9–12.7)
WBC # BLD AUTO: 6.99 K/UL (ref 3.9–12.7)
WBC # BLD AUTO: 7.16 K/UL (ref 3.9–12.7)
WBC # BLD AUTO: 7.72 K/UL (ref 3.9–12.7)
WBC # BLD AUTO: 7.79 K/UL (ref 3.9–12.7)
WBC # BLD AUTO: 7.94 K/UL (ref 3.9–12.7)
WBC # BLD AUTO: 8.05 K/UL (ref 3.9–12.7)
WBC # BLD AUTO: 8.37 K/UL (ref 3.9–12.7)
WBC # BLD AUTO: 8.52 K/UL (ref 3.9–12.7)
WBC # BLD AUTO: 8.97 K/UL (ref 3.9–12.7)
WBC # BLD AUTO: 8.97 K/UL (ref 3.9–12.7)
WBC # BLD AUTO: 9.59 K/UL (ref 3.9–12.7)
WBC # BLD AUTO: 9.63 K/UL (ref 3.9–12.7)
WBC # BLD AUTO: 9.87 K/UL (ref 3.9–12.7)
WBC #/AREA URNS HPF: >100 /HPF (ref 0–5)

## 2021-01-01 PROCEDURE — 25000003 PHARM REV CODE 250: Performed by: EMERGENCY MEDICINE

## 2021-01-01 PROCEDURE — 97535 SELF CARE MNGMENT TRAINING: CPT

## 2021-01-01 PROCEDURE — 27000221 HC OXYGEN, UP TO 24 HOURS

## 2021-01-01 PROCEDURE — 25000003 PHARM REV CODE 250: Performed by: STUDENT IN AN ORGANIZED HEALTH CARE EDUCATION/TRAINING PROGRAM

## 2021-01-01 PROCEDURE — 97530 THERAPEUTIC ACTIVITIES: CPT

## 2021-01-01 PROCEDURE — U0002 COVID-19 LAB TEST NON-CDC: HCPCS

## 2021-01-01 PROCEDURE — 25000003 PHARM REV CODE 250: Performed by: INTERNAL MEDICINE

## 2021-01-01 PROCEDURE — 84484 ASSAY OF TROPONIN QUANT: CPT

## 2021-01-01 PROCEDURE — 87186 SC STD MICRODIL/AGAR DIL: CPT

## 2021-01-01 PROCEDURE — 1111F DSCHRG MED/CURRENT MED MERGE: CPT | Mod: S$GLB,,, | Performed by: FAMILY MEDICINE

## 2021-01-01 PROCEDURE — 80053 COMPREHEN METABOLIC PANEL: CPT

## 2021-01-01 PROCEDURE — 97162 PT EVAL MOD COMPLEX 30 MIN: CPT

## 2021-01-01 PROCEDURE — 63600175 PHARM REV CODE 636 W HCPCS: Performed by: NURSE PRACTITIONER

## 2021-01-01 PROCEDURE — 20600001 HC STEP DOWN PRIVATE ROOM

## 2021-01-01 PROCEDURE — 82550 ASSAY OF CK (CPK): CPT

## 2021-01-01 PROCEDURE — 86580 TB INTRADERMAL TEST: CPT | Performed by: HOSPITALIST

## 2021-01-01 PROCEDURE — 97535 SELF CARE MNGMENT TRAINING: CPT | Mod: CO

## 2021-01-01 PROCEDURE — 99223 1ST HOSP IP/OBS HIGH 75: CPT | Mod: ,,, | Performed by: INTERNAL MEDICINE

## 2021-01-01 PROCEDURE — 83615 LACTATE (LD) (LDH) ENZYME: CPT

## 2021-01-01 PROCEDURE — 83735 ASSAY OF MAGNESIUM: CPT

## 2021-01-01 PROCEDURE — 36415 COLL VENOUS BLD VENIPUNCTURE: CPT

## 2021-01-01 PROCEDURE — 83880 ASSAY OF NATRIURETIC PEPTIDE: CPT

## 2021-01-01 PROCEDURE — 93010 EKG 12-LEAD: ICD-10-PCS | Mod: ,,, | Performed by: INTERNAL MEDICINE

## 2021-01-01 PROCEDURE — 36569 INSJ PICC 5 YR+ W/O IMAGING: CPT

## 2021-01-01 PROCEDURE — 99231 PR SUBSEQUENT HOSPITAL CARE,LEVL I: ICD-10-PCS | Mod: ,,, | Performed by: INTERNAL MEDICINE

## 2021-01-01 PROCEDURE — 84484 ASSAY OF TROPONIN QUANT: CPT | Mod: 91

## 2021-01-01 PROCEDURE — 85025 COMPLETE CBC W/AUTO DIFF WBC: CPT

## 2021-01-01 PROCEDURE — 99306 PR NURSING FACILITY CARE, INIT, HIGH SEVERITY: ICD-10-PCS | Mod: S$GLB,,, | Performed by: FAMILY MEDICINE

## 2021-01-01 PROCEDURE — 25000003 PHARM REV CODE 250: Performed by: NURSE PRACTITIONER

## 2021-01-01 PROCEDURE — 30200315 PPD INTRADERMAL TEST REV CODE 302: Performed by: HOSPITALIST

## 2021-01-01 PROCEDURE — 63600175 PHARM REV CODE 636 W HCPCS: Performed by: INTERNAL MEDICINE

## 2021-01-01 PROCEDURE — 97803 MED NUTRITION INDIV SUBSEQ: CPT

## 2021-01-01 PROCEDURE — 21400001 HC TELEMETRY ROOM

## 2021-01-01 PROCEDURE — 85027 COMPLETE CBC AUTOMATED: CPT

## 2021-01-01 PROCEDURE — 94761 N-INVAS EAR/PLS OXIMETRY MLT: CPT

## 2021-01-01 PROCEDURE — 86900 BLOOD TYPING SEROLOGIC ABO: CPT

## 2021-01-01 PROCEDURE — 99285 EMERGENCY DEPT VISIT HI MDM: CPT | Mod: 25

## 2021-01-01 PROCEDURE — 11000001 HC ACUTE MED/SURG PRIVATE ROOM

## 2021-01-01 PROCEDURE — 87040 BLOOD CULTURE FOR BACTERIA: CPT | Mod: 59

## 2021-01-01 PROCEDURE — 82803 BLOOD GASES ANY COMBINATION: CPT

## 2021-01-01 PROCEDURE — 82728 ASSAY OF FERRITIN: CPT

## 2021-01-01 PROCEDURE — 99900035 HC TECH TIME PER 15 MIN (STAT)

## 2021-01-01 PROCEDURE — 36600 WITHDRAWAL OF ARTERIAL BLOOD: CPT

## 2021-01-01 PROCEDURE — P9612 CATHETERIZE FOR URINE SPEC: HCPCS

## 2021-01-01 PROCEDURE — 80202 ASSAY OF VANCOMYCIN: CPT

## 2021-01-01 PROCEDURE — 87077 CULTURE AEROBIC IDENTIFY: CPT

## 2021-01-01 PROCEDURE — 87088 URINE BACTERIA CULTURE: CPT

## 2021-01-01 PROCEDURE — 86140 C-REACTIVE PROTEIN: CPT

## 2021-01-01 PROCEDURE — 99231 SBSQ HOSP IP/OBS SF/LOW 25: CPT | Mod: ,,, | Performed by: INTERNAL MEDICINE

## 2021-01-01 PROCEDURE — G0378 HOSPITAL OBSERVATION PER HR: HCPCS

## 2021-01-01 PROCEDURE — 84100 ASSAY OF PHOSPHORUS: CPT

## 2021-01-01 PROCEDURE — 25000003 PHARM REV CODE 250: Performed by: SPECIALIST

## 2021-01-01 PROCEDURE — 99232 SBSQ HOSP IP/OBS MODERATE 35: CPT | Mod: ,,, | Performed by: INTERNAL MEDICINE

## 2021-01-01 PROCEDURE — 12002 RPR S/N/AX/GEN/TRNK2.6-7.5CM: CPT

## 2021-01-01 PROCEDURE — 93010 ELECTROCARDIOGRAM REPORT: CPT | Mod: ,,, | Performed by: SPECIALIST

## 2021-01-01 PROCEDURE — 63600175 PHARM REV CODE 636 W HCPCS: Performed by: STUDENT IN AN ORGANIZED HEALTH CARE EDUCATION/TRAINING PROGRAM

## 2021-01-01 PROCEDURE — 93010 EKG 12-LEAD: ICD-10-PCS | Mod: ,,, | Performed by: SPECIALIST

## 2021-01-01 PROCEDURE — 25000003 PHARM REV CODE 250: Performed by: HOSPITALIST

## 2021-01-01 PROCEDURE — 99223 PR INITIAL HOSPITAL CARE,LEVL III: ICD-10-PCS | Mod: ,,, | Performed by: INTERNAL MEDICINE

## 2021-01-01 PROCEDURE — 25000242 PHARM REV CODE 250 ALT 637 W/ HCPCS: Performed by: STUDENT IN AN ORGANIZED HEALTH CARE EDUCATION/TRAINING PROGRAM

## 2021-01-01 PROCEDURE — 87040 BLOOD CULTURE FOR BACTERIA: CPT

## 2021-01-01 PROCEDURE — 97166 OT EVAL MOD COMPLEX 45 MIN: CPT

## 2021-01-01 PROCEDURE — 80048 BASIC METABOLIC PNL TOTAL CA: CPT

## 2021-01-01 PROCEDURE — 81000 URINALYSIS NONAUTO W/SCOPE: CPT

## 2021-01-01 PROCEDURE — 85610 PROTHROMBIN TIME: CPT

## 2021-01-01 PROCEDURE — C1751 CATH, INF, PER/CENT/MIDLINE: HCPCS

## 2021-01-01 PROCEDURE — 12000002 HC ACUTE/MED SURGE SEMI-PRIVATE ROOM

## 2021-01-01 PROCEDURE — 99306 1ST NF CARE HIGH MDM 50: CPT | Mod: S$GLB,,, | Performed by: FAMILY MEDICINE

## 2021-01-01 PROCEDURE — 84132 ASSAY OF SERUM POTASSIUM: CPT

## 2021-01-01 PROCEDURE — P9047 ALBUMIN (HUMAN), 25%, 50ML: HCPCS | Mod: JG | Performed by: SPECIALIST

## 2021-01-01 PROCEDURE — 85379 FIBRIN DEGRADATION QUANT: CPT

## 2021-01-01 PROCEDURE — 94618 PULMONARY STRESS TESTING: CPT

## 2021-01-01 PROCEDURE — 83605 ASSAY OF LACTIC ACID: CPT

## 2021-01-01 PROCEDURE — 97530 THERAPEUTIC ACTIVITIES: CPT | Mod: CQ

## 2021-01-01 PROCEDURE — 99309 PR NURSING FAC CARE, SUBSEQ, SIGNIF COMPLIC: ICD-10-PCS | Mod: S$GLB,,, | Performed by: FAMILY MEDICINE

## 2021-01-01 PROCEDURE — 84443 ASSAY THYROID STIM HORMONE: CPT

## 2021-01-01 PROCEDURE — 99309 SBSQ NF CARE MODERATE MDM 30: CPT | Mod: S$GLB,,, | Performed by: FAMILY MEDICINE

## 2021-01-01 PROCEDURE — 97110 THERAPEUTIC EXERCISES: CPT

## 2021-01-01 PROCEDURE — 93005 ELECTROCARDIOGRAM TRACING: CPT

## 2021-01-01 PROCEDURE — 99232 PR SUBSEQUENT HOSPITAL CARE,LEVL II: ICD-10-PCS | Mod: ,,, | Performed by: INTERNAL MEDICINE

## 2021-01-01 PROCEDURE — 84145 PROCALCITONIN (PCT): CPT

## 2021-01-01 PROCEDURE — 80061 LIPID PANEL: CPT

## 2021-01-01 PROCEDURE — 1111F PR DISCHARGE MEDS RECONCILED W/ CURRENT OUTPATIENT MED LIST: ICD-10-PCS | Mod: S$GLB,,, | Performed by: FAMILY MEDICINE

## 2021-01-01 PROCEDURE — 76937 US GUIDE VASCULAR ACCESS: CPT

## 2021-01-01 PROCEDURE — 97161 PT EVAL LOW COMPLEX 20 MIN: CPT

## 2021-01-01 PROCEDURE — 87086 URINE CULTURE/COLONY COUNT: CPT

## 2021-01-01 PROCEDURE — 93010 ELECTROCARDIOGRAM REPORT: CPT | Mod: ,,, | Performed by: INTERNAL MEDICINE

## 2021-01-01 PROCEDURE — 97165 OT EVAL LOW COMPLEX 30 MIN: CPT

## 2021-01-01 PROCEDURE — 80053 COMPREHEN METABOLIC PANEL: CPT | Mod: 91

## 2021-01-01 PROCEDURE — 63600175 PHARM REV CODE 636 W HCPCS: Mod: JG | Performed by: SPECIALIST

## 2021-01-01 PROCEDURE — 94760 N-INVAS EAR/PLS OXIMETRY 1: CPT

## 2021-01-01 PROCEDURE — 85730 THROMBOPLASTIN TIME PARTIAL: CPT

## 2021-01-01 PROCEDURE — 87147 CULTURE TYPE IMMUNOLOGIC: CPT | Mod: 59

## 2021-01-01 PROCEDURE — 97802 MEDICAL NUTRITION INDIV IN: CPT

## 2021-01-01 PROCEDURE — 82962 GLUCOSE BLOOD TEST: CPT

## 2021-01-01 PROCEDURE — 93005 ELECTROCARDIOGRAM TRACING: CPT | Performed by: SPECIALIST

## 2021-01-01 PROCEDURE — 93306 TTE W/DOPPLER COMPLETE: CPT

## 2021-01-01 PROCEDURE — 36410 VNPNXR 3YR/> PHY/QHP DX/THER: CPT

## 2021-01-01 PROCEDURE — 99900031 HC PATIENT EDUCATION (STAT)

## 2021-01-01 PROCEDURE — 96365 THER/PROPH/DIAG IV INF INIT: CPT

## 2021-01-01 PROCEDURE — 83735 ASSAY OF MAGNESIUM: CPT | Mod: 91

## 2021-01-01 RX ORDER — FUROSEMIDE 10 MG/ML
40 INJECTION INTRAMUSCULAR; INTRAVENOUS ONCE
Status: COMPLETED | OUTPATIENT
Start: 2021-01-01 | End: 2021-01-01

## 2021-01-01 RX ORDER — CEPHALEXIN 250 MG/1
250 CAPSULE ORAL EVERY 8 HOURS
Status: DISCONTINUED | OUTPATIENT
Start: 2021-01-01 | End: 2021-01-01

## 2021-01-01 RX ORDER — DIPHENHYDRAMINE HYDROCHLORIDE 50 MG/ML
25 INJECTION INTRAMUSCULAR; INTRAVENOUS EVERY 6 HOURS PRN
Status: DISCONTINUED | OUTPATIENT
Start: 2021-01-01 | End: 2021-01-01 | Stop reason: HOSPADM

## 2021-01-01 RX ORDER — FUROSEMIDE 10 MG/ML
40 INJECTION INTRAMUSCULAR; INTRAVENOUS
Status: DISCONTINUED | OUTPATIENT
Start: 2021-01-01 | End: 2021-01-01

## 2021-01-01 RX ORDER — LATANOPROST 50 UG/ML
1 SOLUTION/ DROPS OPHTHALMIC NIGHTLY
Status: DISCONTINUED | OUTPATIENT
Start: 2021-01-01 | End: 2021-01-01 | Stop reason: HOSPADM

## 2021-01-01 RX ORDER — ACETAMINOPHEN 325 MG/1
650 TABLET ORAL EVERY 6 HOURS PRN
Status: DISCONTINUED | OUTPATIENT
Start: 2021-01-01 | End: 2021-01-01

## 2021-01-01 RX ORDER — ATORVASTATIN CALCIUM 20 MG/1
20 TABLET, FILM COATED ORAL NIGHTLY
Qty: 30 TABLET | Refills: 0 | Status: SHIPPED | OUTPATIENT
Start: 2021-01-01 | End: 2021-02-27

## 2021-01-01 RX ORDER — FUROSEMIDE 10 MG/ML
40 INJECTION INTRAMUSCULAR; INTRAVENOUS EVERY 12 HOURS
Status: DISCONTINUED | OUTPATIENT
Start: 2021-01-01 | End: 2021-01-01

## 2021-01-01 RX ORDER — LEVOFLOXACIN 5 MG/ML
500 INJECTION, SOLUTION INTRAVENOUS ONCE
Status: COMPLETED | OUTPATIENT
Start: 2021-01-01 | End: 2021-01-01

## 2021-01-01 RX ORDER — ONDANSETRON 2 MG/ML
4 INJECTION INTRAMUSCULAR; INTRAVENOUS EVERY 6 HOURS PRN
Status: DISCONTINUED | OUTPATIENT
Start: 2021-01-01 | End: 2021-01-01 | Stop reason: HOSPADM

## 2021-01-01 RX ORDER — ENOXAPARIN SODIUM 100 MG/ML
30 INJECTION SUBCUTANEOUS
Status: DISCONTINUED | OUTPATIENT
Start: 2021-01-01 | End: 2021-01-01

## 2021-01-01 RX ORDER — FUROSEMIDE 40 MG/1
40 TABLET ORAL 2 TIMES DAILY
Status: DISCONTINUED | OUTPATIENT
Start: 2021-01-01 | End: 2021-01-01

## 2021-01-01 RX ORDER — MAGNESIUM SULFATE 1 G/100ML
1 INJECTION INTRAVENOUS
Status: DISCONTINUED | OUTPATIENT
Start: 2021-01-01 | End: 2021-01-01 | Stop reason: HOSPADM

## 2021-01-01 RX ORDER — ZINC GLUCONATE 50 MG
100 TABLET ORAL DAILY
COMMUNITY

## 2021-01-01 RX ORDER — MORPHINE SULFATE 2 MG/ML
2 INJECTION, SOLUTION INTRAMUSCULAR; INTRAVENOUS
Status: CANCELLED | OUTPATIENT
Start: 2021-01-01

## 2021-01-01 RX ORDER — BENZONATATE 100 MG/1
100 CAPSULE ORAL 3 TIMES DAILY PRN
Status: DISCONTINUED | OUTPATIENT
Start: 2021-01-01 | End: 2021-01-01 | Stop reason: HOSPADM

## 2021-01-01 RX ORDER — ALBUMIN HUMAN 250 G/1000ML
25 SOLUTION INTRAVENOUS ONCE
Status: COMPLETED | OUTPATIENT
Start: 2021-01-01 | End: 2021-01-01

## 2021-01-01 RX ORDER — POTASSIUM CHLORIDE 7.45 MG/ML
40 INJECTION INTRAVENOUS
Status: DISCONTINUED | OUTPATIENT
Start: 2021-01-01 | End: 2021-01-01 | Stop reason: HOSPADM

## 2021-01-01 RX ORDER — MUPIROCIN 20 MG/G
OINTMENT TOPICAL 2 TIMES DAILY
Status: DISCONTINUED | OUTPATIENT
Start: 2021-01-01 | End: 2021-01-01 | Stop reason: HOSPADM

## 2021-01-01 RX ORDER — SODIUM CHLORIDE 9 MG/ML
INJECTION, SOLUTION INTRAVENOUS CONTINUOUS
Status: CANCELLED | OUTPATIENT
Start: 2021-01-01

## 2021-01-01 RX ORDER — OXYBUTYNIN CHLORIDE 5 MG/1
5 TABLET, EXTENDED RELEASE ORAL DAILY
Status: DISCONTINUED | OUTPATIENT
Start: 2021-01-01 | End: 2021-01-01 | Stop reason: HOSPADM

## 2021-01-01 RX ORDER — CALCIUM CHLORIDE IN 0.9 % NACL 1 G/100 ML
1 INTRAVENOUS SOLUTION, PIGGYBACK (ML) INTRAVENOUS
Status: DISCONTINUED | OUTPATIENT
Start: 2021-01-01 | End: 2021-01-01 | Stop reason: HOSPADM

## 2021-01-01 RX ORDER — LANOLIN ALCOHOL/MO/W.PET/CERES
800 CREAM (GRAM) TOPICAL
Status: DISCONTINUED | OUTPATIENT
Start: 2021-01-01 | End: 2021-01-01 | Stop reason: HOSPADM

## 2021-01-01 RX ORDER — FUROSEMIDE 40 MG/1
40 TABLET ORAL
Status: DISCONTINUED | OUTPATIENT
Start: 2021-01-01 | End: 2021-01-01 | Stop reason: HOSPADM

## 2021-01-01 RX ORDER — SODIUM CHLORIDE 9 MG/ML
INJECTION, SOLUTION INTRAVENOUS
Status: DISCONTINUED | OUTPATIENT
Start: 2021-01-01 | End: 2021-01-01

## 2021-01-01 RX ORDER — CEFAZOLIN SODIUM 1 G/50ML
1 SOLUTION INTRAVENOUS
Status: DISCONTINUED | OUTPATIENT
Start: 2021-01-01 | End: 2021-01-01 | Stop reason: HOSPADM

## 2021-01-01 RX ORDER — LEVOFLOXACIN 5 MG/ML
250 INJECTION, SOLUTION INTRAVENOUS
Status: DISCONTINUED | OUTPATIENT
Start: 2021-01-01 | End: 2021-01-01

## 2021-01-01 RX ORDER — ATORVASTATIN CALCIUM 20 MG/1
20 TABLET, FILM COATED ORAL NIGHTLY
Status: DISCONTINUED | OUTPATIENT
Start: 2021-01-01 | End: 2021-01-01 | Stop reason: HOSPADM

## 2021-01-01 RX ORDER — AMOXICILLIN 250 MG
1 CAPSULE ORAL 2 TIMES DAILY
Status: DISCONTINUED | OUTPATIENT
Start: 2021-01-01 | End: 2021-01-01

## 2021-01-01 RX ORDER — ACETAMINOPHEN 500 MG
5000 TABLET ORAL DAILY
COMMUNITY

## 2021-01-01 RX ORDER — AMLODIPINE BESYLATE 5 MG/1
5 TABLET ORAL DAILY
Status: DISCONTINUED | OUTPATIENT
Start: 2021-01-01 | End: 2021-01-01 | Stop reason: HOSPADM

## 2021-01-01 RX ORDER — MAGNESIUM SULFATE HEPTAHYDRATE 40 MG/ML
2 INJECTION, SOLUTION INTRAVENOUS
Status: DISCONTINUED | OUTPATIENT
Start: 2021-01-01 | End: 2021-01-01 | Stop reason: HOSPADM

## 2021-01-01 RX ORDER — TALC
9 POWDER (GRAM) TOPICAL NIGHTLY PRN
Status: DISCONTINUED | OUTPATIENT
Start: 2021-01-01 | End: 2021-01-01 | Stop reason: HOSPADM

## 2021-01-01 RX ORDER — GABAPENTIN 100 MG/1
100 CAPSULE ORAL NIGHTLY
Status: DISCONTINUED | OUTPATIENT
Start: 2021-01-01 | End: 2021-01-01

## 2021-01-01 RX ORDER — MAGNESIUM SULFATE HEPTAHYDRATE 40 MG/ML
4 INJECTION, SOLUTION INTRAVENOUS
Status: DISCONTINUED | OUTPATIENT
Start: 2021-01-01 | End: 2021-01-01 | Stop reason: HOSPADM

## 2021-01-01 RX ORDER — ONDANSETRON 2 MG/ML
4 INJECTION INTRAMUSCULAR; INTRAVENOUS EVERY 6 HOURS PRN
Status: CANCELLED | OUTPATIENT
Start: 2021-01-01

## 2021-01-01 RX ORDER — ACETAMINOPHEN 650 MG/1
650 SUPPOSITORY RECTAL EVERY 4 HOURS PRN
Status: CANCELLED | OUTPATIENT
Start: 2021-01-01

## 2021-01-01 RX ORDER — ASPIRIN 81 MG/1
81 TABLET ORAL DAILY
Status: DISCONTINUED | OUTPATIENT
Start: 2021-01-01 | End: 2021-01-01 | Stop reason: HOSPADM

## 2021-01-01 RX ORDER — POLYETHYLENE GLYCOL 3350 17 G/17G
17 POWDER, FOR SOLUTION ORAL 2 TIMES DAILY PRN
Status: DISCONTINUED | OUTPATIENT
Start: 2021-01-01 | End: 2021-01-01 | Stop reason: HOSPADM

## 2021-01-01 RX ORDER — FUROSEMIDE 40 MG/1
40 TABLET ORAL
Qty: 60 TABLET | Refills: 0 | Status: ON HOLD | OUTPATIENT
Start: 2021-01-01 | End: 2021-01-01 | Stop reason: HOSPADM

## 2021-01-01 RX ORDER — VITAMIN B COMPLEX
1 CAPSULE ORAL DAILY
COMMUNITY

## 2021-01-01 RX ORDER — SODIUM CHLORIDE 0.9 % (FLUSH) 0.9 %
10 SYRINGE (ML) INJECTION
Status: DISCONTINUED | OUTPATIENT
Start: 2021-01-01 | End: 2021-01-01 | Stop reason: HOSPADM

## 2021-01-01 RX ORDER — FUROSEMIDE 10 MG/ML
80 INJECTION INTRAMUSCULAR; INTRAVENOUS EVERY 12 HOURS
Status: DISCONTINUED | OUTPATIENT
Start: 2021-01-01 | End: 2021-01-01

## 2021-01-01 RX ORDER — ACETAMINOPHEN 650 MG/1
650 SUPPOSITORY RECTAL EVERY 4 HOURS PRN
Status: DISCONTINUED | OUTPATIENT
Start: 2021-01-01 | End: 2021-01-01 | Stop reason: HOSPADM

## 2021-01-01 RX ORDER — ONDANSETRON 2 MG/ML
4 INJECTION INTRAMUSCULAR; INTRAVENOUS EVERY 8 HOURS PRN
Status: DISCONTINUED | OUTPATIENT
Start: 2021-01-01 | End: 2021-01-01 | Stop reason: HOSPADM

## 2021-01-01 RX ORDER — DEXTROSE MONOHYDRATE 50 MG/ML
INJECTION, SOLUTION INTRAVENOUS CONTINUOUS
Status: DISCONTINUED | OUTPATIENT
Start: 2021-01-01 | End: 2021-01-01

## 2021-01-01 RX ORDER — LORAZEPAM 2 MG/ML
1 INJECTION INTRAMUSCULAR
Status: CANCELLED | OUTPATIENT
Start: 2021-01-01

## 2021-01-01 RX ORDER — FUROSEMIDE 10 MG/ML
20 INJECTION INTRAMUSCULAR; INTRAVENOUS ONCE
Status: COMPLETED | OUTPATIENT
Start: 2021-01-01 | End: 2021-01-01

## 2021-01-01 RX ORDER — CEFAZOLIN SODIUM 1 G/50ML
2 SOLUTION INTRAVENOUS EVERY 12 HOURS
Qty: 1400 ML | Refills: 0
Start: 2021-01-01 | End: 2021-01-01

## 2021-01-01 RX ORDER — DEXAMETHASONE 6 MG/1
6 TABLET ORAL DAILY
Qty: 5 TABLET | Refills: 0 | Status: SHIPPED | OUTPATIENT
Start: 2021-01-01 | End: 2021-01-01

## 2021-01-01 RX ORDER — HALOPERIDOL 5 MG/ML
2.5 INJECTION INTRAMUSCULAR ONCE
Status: COMPLETED | OUTPATIENT
Start: 2021-01-01 | End: 2021-01-01

## 2021-01-01 RX ORDER — POTASSIUM CHLORIDE 20 MEQ/1
20 TABLET, EXTENDED RELEASE ORAL
Status: DISCONTINUED | OUTPATIENT
Start: 2021-01-01 | End: 2021-01-01 | Stop reason: HOSPADM

## 2021-01-01 RX ORDER — CIPROFLOXACIN 2 MG/ML
400 INJECTION, SOLUTION INTRAVENOUS
Status: DISCONTINUED | OUTPATIENT
Start: 2021-01-01 | End: 2021-01-01 | Stop reason: HOSPADM

## 2021-01-01 RX ORDER — FAMOTIDINE 20 MG/1
20 TABLET, FILM COATED ORAL DAILY
Status: DISCONTINUED | OUTPATIENT
Start: 2021-01-01 | End: 2021-01-01 | Stop reason: HOSPADM

## 2021-01-01 RX ORDER — IBUPROFEN 100 MG/5ML
1000 SUSPENSION, ORAL (FINAL DOSE FORM) ORAL 3 TIMES DAILY
COMMUNITY

## 2021-01-01 RX ORDER — MAGNESIUM SULFATE HEPTAHYDRATE 40 MG/ML
2 INJECTION, SOLUTION INTRAVENOUS ONCE
Status: COMPLETED | OUTPATIENT
Start: 2021-01-01 | End: 2021-01-01

## 2021-01-01 RX ORDER — METOLAZONE 5 MG/1
5 TABLET ORAL ONCE
Status: COMPLETED | OUTPATIENT
Start: 2021-01-01 | End: 2021-01-01

## 2021-01-01 RX ORDER — POTASSIUM CHLORIDE 7.45 MG/ML
10 INJECTION INTRAVENOUS ONCE
Status: COMPLETED | OUTPATIENT
Start: 2021-01-01 | End: 2021-01-01

## 2021-01-01 RX ORDER — LIDOCAINE HYDROCHLORIDE AND EPINEPHRINE 10; 10 MG/ML; UG/ML
10 INJECTION, SOLUTION INFILTRATION; PERINEURAL ONCE
Status: COMPLETED | OUTPATIENT
Start: 2021-01-01 | End: 2021-01-01

## 2021-01-01 RX ORDER — LORAZEPAM 2 MG/ML
1 INJECTION INTRAMUSCULAR
Status: DISCONTINUED | OUTPATIENT
Start: 2021-01-01 | End: 2021-01-01 | Stop reason: HOSPADM

## 2021-01-01 RX ORDER — TALC
6 POWDER (GRAM) TOPICAL NIGHTLY PRN
Status: DISCONTINUED | OUTPATIENT
Start: 2021-01-01 | End: 2021-01-01 | Stop reason: HOSPADM

## 2021-01-01 RX ORDER — GABAPENTIN 100 MG/1
100 CAPSULE ORAL NIGHTLY
Status: DISCONTINUED | OUTPATIENT
Start: 2021-01-01 | End: 2021-01-01 | Stop reason: HOSPADM

## 2021-01-01 RX ORDER — CIPROFLOXACIN 2 MG/ML
400 INJECTION, SOLUTION INTRAVENOUS
Status: COMPLETED | OUTPATIENT
Start: 2021-01-01 | End: 2021-01-01

## 2021-01-01 RX ORDER — FUROSEMIDE 10 MG/ML
80 INJECTION INTRAMUSCULAR; INTRAVENOUS
Status: DISCONTINUED | OUTPATIENT
Start: 2021-01-01 | End: 2021-01-01

## 2021-01-01 RX ORDER — ALBUMIN HUMAN 250 G/1000ML
SOLUTION INTRAVENOUS
Status: DISPENSED
Start: 2021-01-01 | End: 2021-01-01

## 2021-01-01 RX ORDER — FUROSEMIDE 40 MG/1
40 TABLET ORAL DAILY
Status: DISCONTINUED | OUTPATIENT
Start: 2021-01-01 | End: 2021-01-01 | Stop reason: HOSPADM

## 2021-01-01 RX ORDER — VANCOMYCIN HCL IN 5 % DEXTROSE 1G/250ML
15 PLASTIC BAG, INJECTION (ML) INTRAVENOUS
Status: DISCONTINUED | OUTPATIENT
Start: 2021-01-01 | End: 2021-01-01

## 2021-01-01 RX ORDER — MUPIROCIN 20 MG/G
OINTMENT TOPICAL DAILY
Status: DISCONTINUED | OUTPATIENT
Start: 2021-01-01 | End: 2021-01-01 | Stop reason: HOSPADM

## 2021-01-01 RX ORDER — ACETAMINOPHEN 10 MG/ML
1000 INJECTION, SOLUTION INTRAVENOUS ONCE
Status: COMPLETED | OUTPATIENT
Start: 2021-01-01 | End: 2021-01-01

## 2021-01-01 RX ORDER — MORPHINE SULFATE 2 MG/ML
2 INJECTION, SOLUTION INTRAMUSCULAR; INTRAVENOUS EVERY 30 MIN PRN
Status: DISCONTINUED | OUTPATIENT
Start: 2021-01-01 | End: 2021-01-01 | Stop reason: HOSPADM

## 2021-01-01 RX ORDER — POTASSIUM CHLORIDE 20 MEQ/1
40 TABLET, EXTENDED RELEASE ORAL
Status: DISCONTINUED | OUTPATIENT
Start: 2021-01-01 | End: 2021-01-01 | Stop reason: HOSPADM

## 2021-01-01 RX ORDER — ACETAMINOPHEN 325 MG/1
650 TABLET ORAL EVERY 4 HOURS PRN
Status: DISCONTINUED | OUTPATIENT
Start: 2021-01-01 | End: 2021-01-01

## 2021-01-01 RX ORDER — ACETAMINOPHEN 325 MG/1
650 TABLET ORAL EVERY 4 HOURS PRN
Status: DISCONTINUED | OUTPATIENT
Start: 2021-01-01 | End: 2021-01-01 | Stop reason: HOSPADM

## 2021-01-01 RX ORDER — ASPIRIN 325 MG
325 TABLET ORAL
Status: COMPLETED | OUTPATIENT
Start: 2021-01-01 | End: 2021-01-01

## 2021-01-01 RX ORDER — FUROSEMIDE 10 MG/ML
60 INJECTION INTRAMUSCULAR; INTRAVENOUS EVERY 12 HOURS
Status: DISCONTINUED | OUTPATIENT
Start: 2021-01-01 | End: 2021-01-01

## 2021-01-01 RX ORDER — IBUPROFEN 200 MG
16 TABLET ORAL
Status: DISCONTINUED | OUTPATIENT
Start: 2021-01-01 | End: 2021-01-01 | Stop reason: HOSPADM

## 2021-01-01 RX ORDER — IBUPROFEN 200 MG
24 TABLET ORAL
Status: DISCONTINUED | OUTPATIENT
Start: 2021-01-01 | End: 2021-01-01 | Stop reason: HOSPADM

## 2021-01-01 RX ORDER — FUROSEMIDE 10 MG/ML
20 INJECTION INTRAMUSCULAR; INTRAVENOUS ONCE
Status: DISCONTINUED | OUTPATIENT
Start: 2021-01-01 | End: 2021-01-01

## 2021-01-01 RX ORDER — ASPIRIN 81 MG/1
81 TABLET ORAL DAILY
Status: DISCONTINUED | OUTPATIENT
Start: 2021-01-01 | End: 2021-01-01

## 2021-01-01 RX ORDER — FUROSEMIDE 10 MG/ML
60 INJECTION INTRAMUSCULAR; INTRAVENOUS
Status: DISCONTINUED | OUTPATIENT
Start: 2021-01-01 | End: 2021-01-01

## 2021-01-01 RX ORDER — POTASSIUM CHLORIDE 7.45 MG/ML
20 INJECTION INTRAVENOUS
Status: DISCONTINUED | OUTPATIENT
Start: 2021-01-01 | End: 2021-01-01 | Stop reason: HOSPADM

## 2021-01-01 RX ORDER — FUROSEMIDE 40 MG/1
40 TABLET ORAL DAILY
Qty: 30 TABLET | Refills: 11
Start: 2021-01-01 | End: 2022-02-11

## 2021-01-01 RX ORDER — GLUCAGON 1 MG
1 KIT INJECTION
Status: DISCONTINUED | OUTPATIENT
Start: 2021-01-01 | End: 2021-01-01 | Stop reason: HOSPADM

## 2021-01-01 RX ORDER — HALOPERIDOL 5 MG/ML
2.5 INJECTION INTRAMUSCULAR EVERY 6 HOURS PRN
Status: DISCONTINUED | OUTPATIENT
Start: 2021-01-01 | End: 2021-01-01

## 2021-01-01 RX ORDER — POTASSIUM CHLORIDE 20 MEQ/1
40 TABLET, EXTENDED RELEASE ORAL ONCE
Status: COMPLETED | OUTPATIENT
Start: 2021-01-01 | End: 2021-01-01

## 2021-01-01 RX ORDER — MORPHINE SULFATE 2 MG/ML
2 INJECTION, SOLUTION INTRAMUSCULAR; INTRAVENOUS
Status: DISCONTINUED | OUTPATIENT
Start: 2021-01-01 | End: 2021-01-01 | Stop reason: HOSPADM

## 2021-01-01 RX ADMIN — FUROSEMIDE 40 MG: 40 TABLET ORAL at 05:01

## 2021-01-01 RX ADMIN — THERA TABS 1 TABLET: TAB at 09:01

## 2021-01-01 RX ADMIN — AMLODIPINE BESYLATE 5 MG: 5 TABLET ORAL at 08:01

## 2021-01-01 RX ADMIN — SENNOSIDES AND DOCUSATE SODIUM 1 TABLET: 8.6; 5 TABLET ORAL at 09:01

## 2021-01-01 RX ADMIN — FUROSEMIDE 80 MG: 10 INJECTION, SOLUTION INTRAMUSCULAR; INTRAVENOUS at 10:01

## 2021-01-01 RX ADMIN — SENNOSIDES AND DOCUSATE SODIUM 1 TABLET: 8.6; 5 TABLET ORAL at 10:01

## 2021-01-01 RX ADMIN — ACETAMINOPHEN 650 MG: 325 TABLET, FILM COATED ORAL at 09:01

## 2021-01-01 RX ADMIN — CEFAZOLIN SODIUM 1 G: 1 SOLUTION INTRAVENOUS at 09:01

## 2021-01-01 RX ADMIN — POTASSIUM CHLORIDE 10 MEQ: 7.46 INJECTION, SOLUTION INTRAVENOUS at 06:02

## 2021-01-01 RX ADMIN — SENNOSIDES AND DOCUSATE SODIUM 1 TABLET: 8.6; 5 TABLET ORAL at 11:01

## 2021-01-01 RX ADMIN — FUROSEMIDE 60 MG: 10 INJECTION, SOLUTION INTRAMUSCULAR; INTRAVENOUS at 09:02

## 2021-01-01 RX ADMIN — REMDESIVIR 200 MG: 100 INJECTION, POWDER, LYOPHILIZED, FOR SOLUTION INTRAVENOUS at 08:02

## 2021-01-01 RX ADMIN — GABAPENTIN 100 MG: 100 CAPSULE ORAL at 09:02

## 2021-01-01 RX ADMIN — SENNOSIDES AND DOCUSATE SODIUM 1 TABLET: 8.6; 5 TABLET ORAL at 08:01

## 2021-01-01 RX ADMIN — CEFAZOLIN SODIUM 1 G: 1 SOLUTION INTRAVENOUS at 10:01

## 2021-01-01 RX ADMIN — ASPIRIN 81 MG: 81 TABLET, COATED ORAL at 08:02

## 2021-01-01 RX ADMIN — ENOXAPARIN SODIUM 30 MG: 30 INJECTION SUBCUTANEOUS at 10:01

## 2021-01-01 RX ADMIN — LATANOPROST 1 DROP: 50 SOLUTION OPHTHALMIC at 09:01

## 2021-01-01 RX ADMIN — CEFAZOLIN SODIUM 1 G: 1 SOLUTION INTRAVENOUS at 02:01

## 2021-01-01 RX ADMIN — ATORVASTATIN CALCIUM 20 MG: 20 TABLET, FILM COATED ORAL at 09:01

## 2021-01-01 RX ADMIN — SODIUM CHLORIDE 150 ML/HR: 0.9 INJECTION, SOLUTION INTRAVENOUS at 07:01

## 2021-01-01 RX ADMIN — ATORVASTATIN CALCIUM 20 MG: 20 TABLET, FILM COATED ORAL at 08:01

## 2021-01-01 RX ADMIN — POTASSIUM CHLORIDE 20 MEQ: 7.46 INJECTION, SOLUTION INTRAVENOUS at 05:01

## 2021-01-01 RX ADMIN — FAMOTIDINE 20 MG: 20 TABLET ORAL at 09:01

## 2021-01-01 RX ADMIN — FUROSEMIDE 40 MG: 10 INJECTION, SOLUTION INTRAMUSCULAR; INTRAVENOUS at 11:01

## 2021-01-01 RX ADMIN — VANCOMYCIN HYDROCHLORIDE 1250 MG: 1.25 INJECTION, POWDER, LYOPHILIZED, FOR SOLUTION INTRAVENOUS at 02:01

## 2021-01-01 RX ADMIN — POTASSIUM CHLORIDE 20 MEQ: 20 TABLET, EXTENDED RELEASE ORAL at 02:01

## 2021-01-01 RX ADMIN — FUROSEMIDE 40 MG: 40 TABLET ORAL at 08:02

## 2021-01-01 RX ADMIN — THERA TABS 1 TABLET: TAB at 08:01

## 2021-01-01 RX ADMIN — FAMOTIDINE 20 MG: 20 TABLET ORAL at 08:01

## 2021-01-01 RX ADMIN — CEFAZOLIN SODIUM 1 G: 1 SOLUTION INTRAVENOUS at 01:01

## 2021-01-01 RX ADMIN — POTASSIUM CHLORIDE 40 MEQ: 20 TABLET, EXTENDED RELEASE ORAL at 09:01

## 2021-01-01 RX ADMIN — GABAPENTIN 100 MG: 100 CAPSULE ORAL at 11:01

## 2021-01-01 RX ADMIN — CEFAZOLIN SODIUM 1 G: 1 SOLUTION INTRAVENOUS at 05:01

## 2021-01-01 RX ADMIN — TUBERCULIN PURIFIED PROTEIN DERIVATIVE 5 UNITS: 5 INJECTION, SOLUTION INTRADERMAL at 04:01

## 2021-01-01 RX ADMIN — LATANOPROST 1 DROP: 50 SOLUTION OPHTHALMIC at 09:02

## 2021-01-01 RX ADMIN — APIXABAN 2.5 MG: 2.5 TABLET, FILM COATED ORAL at 11:01

## 2021-01-01 RX ADMIN — APIXABAN 2.5 MG: 2.5 TABLET, FILM COATED ORAL at 09:02

## 2021-01-01 RX ADMIN — POTASSIUM BICARBONATE 50 MEQ: 977.5 TABLET, EFFERVESCENT ORAL at 05:01

## 2021-01-01 RX ADMIN — LATANOPROST 1 DROP: 50 SOLUTION OPHTHALMIC at 08:01

## 2021-01-01 RX ADMIN — POTASSIUM CHLORIDE 40 MEQ: 20 TABLET, EXTENDED RELEASE ORAL at 06:01

## 2021-01-01 RX ADMIN — APIXABAN 2.5 MG: 2.5 TABLET, FILM COATED ORAL at 08:01

## 2021-01-01 RX ADMIN — DEXAMETHASONE 6 MG: 2 TABLET ORAL at 06:02

## 2021-01-01 RX ADMIN — ATORVASTATIN CALCIUM 20 MG: 20 TABLET, FILM COATED ORAL at 10:01

## 2021-01-01 RX ADMIN — ASPIRIN 81 MG: 81 TABLET, COATED ORAL at 10:02

## 2021-01-01 RX ADMIN — APIXABAN 2.5 MG: 2.5 TABLET, FILM COATED ORAL at 08:02

## 2021-01-01 RX ADMIN — AMLODIPINE BESYLATE 5 MG: 5 TABLET ORAL at 09:01

## 2021-01-01 RX ADMIN — FUROSEMIDE 20 MG: 10 INJECTION, SOLUTION INTRAMUSCULAR; INTRAVENOUS at 11:02

## 2021-01-01 RX ADMIN — ACETAMINOPHEN 650 MG: 650 SUPPOSITORY RECTAL at 08:02

## 2021-01-01 RX ADMIN — FUROSEMIDE 10 MG/HR: 10 INJECTION, SOLUTION INTRAVENOUS at 09:01

## 2021-01-01 RX ADMIN — OXYBUTYNIN CHLORIDE 5 MG: 5 TABLET, EXTENDED RELEASE ORAL at 08:02

## 2021-01-01 RX ADMIN — MUPIROCIN: 20 OINTMENT TOPICAL at 08:02

## 2021-01-01 RX ADMIN — GABAPENTIN 100 MG: 100 CAPSULE ORAL at 09:01

## 2021-01-01 RX ADMIN — THERA TABS 1 TABLET: TAB at 10:02

## 2021-01-01 RX ADMIN — ASPIRIN 325 MG ORAL TABLET 325 MG: 325 PILL ORAL at 06:01

## 2021-01-01 RX ADMIN — ASPIRIN 81 MG: 81 TABLET, DELAYED RELEASE ORAL at 09:01

## 2021-01-01 RX ADMIN — POTASSIUM BICARBONATE 50 MEQ: 977.5 TABLET, EFFERVESCENT ORAL at 09:01

## 2021-01-01 RX ADMIN — CIPROFLOXACIN 400 MG: 2 INJECTION, SOLUTION INTRAVENOUS at 08:02

## 2021-01-01 RX ADMIN — CEFAZOLIN SODIUM 1 G: 1 SOLUTION INTRAVENOUS at 12:01

## 2021-01-01 RX ADMIN — AMLODIPINE BESYLATE 5 MG: 5 TABLET ORAL at 08:02

## 2021-01-01 RX ADMIN — POTASSIUM CHLORIDE 40 MEQ: 20 TABLET, EXTENDED RELEASE ORAL at 05:01

## 2021-01-01 RX ADMIN — LIDOCAINE HYDROCHLORIDE,EPINEPHRINE BITARTRATE 10 ML: 10; .01 INJECTION, SOLUTION INFILTRATION; PERINEURAL at 03:02

## 2021-01-01 RX ADMIN — CEFAZOLIN SODIUM 1 G: 1 SOLUTION INTRAVENOUS at 08:01

## 2021-01-01 RX ADMIN — MUPIROCIN: 20 OINTMENT TOPICAL at 09:02

## 2021-01-01 RX ADMIN — DEXAMETHASONE 6 MG: 2 TABLET ORAL at 08:02

## 2021-01-01 RX ADMIN — FUROSEMIDE 40 MG: 10 INJECTION, SOLUTION INTRAMUSCULAR; INTRAVENOUS at 09:01

## 2021-01-01 RX ADMIN — VANCOMYCIN HYDROCHLORIDE 1250 MG: 1.25 INJECTION, POWDER, LYOPHILIZED, FOR SOLUTION INTRAVENOUS at 11:01

## 2021-01-01 RX ADMIN — ATORVASTATIN CALCIUM 20 MG: 20 TABLET, FILM COATED ORAL at 09:02

## 2021-01-01 RX ADMIN — ALBUMIN (HUMAN) 25 G: 12.5 SOLUTION INTRAVENOUS at 08:01

## 2021-01-01 RX ADMIN — FUROSEMIDE 80 MG: 10 INJECTION, SOLUTION INTRAMUSCULAR; INTRAVENOUS at 09:02

## 2021-01-01 RX ADMIN — FUROSEMIDE 60 MG: 20 INJECTION, SOLUTION INTRAMUSCULAR; INTRAVENOUS at 11:01

## 2021-01-01 RX ADMIN — APIXABAN 2.5 MG: 2.5 TABLET, FILM COATED ORAL at 10:01

## 2021-01-01 RX ADMIN — FUROSEMIDE 40 MG: 40 TABLET ORAL at 06:01

## 2021-01-01 RX ADMIN — REMDESIVIR 100 MG: 100 INJECTION, POWDER, LYOPHILIZED, FOR SOLUTION INTRAVENOUS at 05:02

## 2021-01-01 RX ADMIN — POTASSIUM BICARBONATE 50 MEQ: 977.5 TABLET, EFFERVESCENT ORAL at 08:01

## 2021-01-01 RX ADMIN — POTASSIUM BICARBONATE 20 MEQ: 391 TABLET, EFFERVESCENT ORAL at 05:01

## 2021-01-01 RX ADMIN — FUROSEMIDE 60 MG: 20 INJECTION, SOLUTION INTRAMUSCULAR; INTRAVENOUS at 10:01

## 2021-01-01 RX ADMIN — POTASSIUM BICARBONATE 50 MEQ: 977.5 TABLET, EFFERVESCENT ORAL at 06:01

## 2021-01-01 RX ADMIN — APIXABAN 2.5 MG: 2.5 TABLET, FILM COATED ORAL at 10:02

## 2021-01-01 RX ADMIN — POTASSIUM BICARBONATE 20 MEQ: 391 TABLET, EFFERVESCENT ORAL at 07:01

## 2021-01-01 RX ADMIN — FUROSEMIDE 40 MG: 10 INJECTION, SOLUTION INTRAMUSCULAR; INTRAVENOUS at 09:02

## 2021-01-01 RX ADMIN — ACETAMINOPHEN 650 MG: 650 SUPPOSITORY RECTAL at 01:02

## 2021-01-01 RX ADMIN — FUROSEMIDE 80 MG: 10 INJECTION, SOLUTION INTRAMUSCULAR; INTRAVENOUS at 10:02

## 2021-01-01 RX ADMIN — FUROSEMIDE 40 MG: 40 TABLET ORAL at 03:01

## 2021-01-01 RX ADMIN — THERA TABS 1 TABLET: TAB at 09:02

## 2021-01-01 RX ADMIN — MELATONIN 6 MG: at 09:01

## 2021-01-01 RX ADMIN — OXYBUTYNIN CHLORIDE 5 MG: 5 TABLET, EXTENDED RELEASE ORAL at 09:02

## 2021-01-01 RX ADMIN — APIXABAN 2.5 MG: 2.5 TABLET, FILM COATED ORAL at 09:01

## 2021-01-01 RX ADMIN — LATANOPROST 1 DROP: 50 SOLUTION OPHTHALMIC at 10:01

## 2021-01-01 RX ADMIN — HALOPERIDOL LACTATE 2.5 MG: 5 INJECTION, SOLUTION INTRAMUSCULAR at 09:02

## 2021-01-01 RX ADMIN — THERA TABS 1 TABLET: TAB at 08:02

## 2021-01-01 RX ADMIN — Medication: at 09:02

## 2021-01-01 RX ADMIN — ACETAMINOPHEN 650 MG: 325 TABLET, FILM COATED ORAL at 03:01

## 2021-01-01 RX ADMIN — ACETAMINOPHEN 650 MG: 325 TABLET, FILM COATED ORAL at 11:01

## 2021-01-01 RX ADMIN — CEPHALEXIN 250 MG: 250 CAPSULE ORAL at 09:01

## 2021-01-01 RX ADMIN — FUROSEMIDE 20 MG: 10 INJECTION, SOLUTION INTRAMUSCULAR; INTRAVENOUS at 06:02

## 2021-01-01 RX ADMIN — FUROSEMIDE 10 MG/HR: 10 INJECTION, SOLUTION INTRAVENOUS at 03:01

## 2021-01-01 RX ADMIN — FUROSEMIDE 10 MG/HR: 10 INJECTION, SOLUTION INTRAVENOUS at 08:01

## 2021-01-01 RX ADMIN — DEXTROSE: 5 SOLUTION INTRAVENOUS at 02:02

## 2021-01-01 RX ADMIN — FUROSEMIDE 10 MG/HR: 10 INJECTION, SOLUTION INTRAVENOUS at 11:01

## 2021-01-01 RX ADMIN — ACETAMINOPHEN 650 MG: 325 TABLET, FILM COATED ORAL at 02:01

## 2021-01-01 RX ADMIN — FUROSEMIDE 40 MG: 10 INJECTION, SOLUTION INTRAVENOUS at 06:01

## 2021-01-01 RX ADMIN — DEXAMETHASONE 6 MG: 2 TABLET ORAL at 09:02

## 2021-01-01 RX ADMIN — CIPROFLOXACIN 400 MG: 2 INJECTION, SOLUTION INTRAVENOUS at 09:02

## 2021-01-01 RX ADMIN — ATORVASTATIN CALCIUM 20 MG: 20 TABLET, FILM COATED ORAL at 11:01

## 2021-01-01 RX ADMIN — ACETAMINOPHEN 650 MG: 325 SUPPOSITORY RECTAL at 07:02

## 2021-01-01 RX ADMIN — AMLODIPINE BESYLATE 5 MG: 5 TABLET ORAL at 09:02

## 2021-01-01 RX ADMIN — MORPHINE SULFATE 2 MG/HR: 10 INJECTION, SOLUTION INTRAMUSCULAR; INTRAVENOUS at 12:02

## 2021-01-01 RX ADMIN — BACITRACIN ZINC NEOMYCIN SULFATE POLYMYXIN B SULFATE: 400; 3.5; 5 OINTMENT TOPICAL at 03:02

## 2021-01-01 RX ADMIN — REMDESIVIR 100 MG: 100 INJECTION, POWDER, LYOPHILIZED, FOR SOLUTION INTRAVENOUS at 02:02

## 2021-01-01 RX ADMIN — FUROSEMIDE 60 MG: 20 INJECTION, SOLUTION INTRAMUSCULAR; INTRAVENOUS at 12:01

## 2021-01-01 RX ADMIN — VANCOMYCIN HYDROCHLORIDE 1500 MG: 1.5 INJECTION, POWDER, LYOPHILIZED, FOR SOLUTION INTRAVENOUS at 03:01

## 2021-01-01 RX ADMIN — FUROSEMIDE 10 MG/HR: 10 INJECTION, SOLUTION INTRAVENOUS at 02:01

## 2021-01-01 RX ADMIN — POTASSIUM BICARBONATE 20 MEQ: 391 TABLET, EFFERVESCENT ORAL at 03:01

## 2021-01-01 RX ADMIN — POLYETHYLENE GLYCOL 3350 17 G: 17 POWDER, FOR SOLUTION ORAL at 09:01

## 2021-01-01 RX ADMIN — FUROSEMIDE 10 MG/HR: 10 INJECTION, SOLUTION INTRAVENOUS at 12:01

## 2021-01-01 RX ADMIN — FUROSEMIDE 40 MG: 10 INJECTION, SOLUTION INTRAMUSCULAR; INTRAVENOUS at 10:02

## 2021-01-01 RX ADMIN — MAGNESIUM SULFATE IN WATER 2 G: 40 INJECTION, SOLUTION INTRAVENOUS at 10:02

## 2021-01-01 RX ADMIN — POTASSIUM CHLORIDE 20 MEQ: 20 TABLET, EXTENDED RELEASE ORAL at 07:01

## 2021-01-01 RX ADMIN — FUROSEMIDE 40 MG: 40 TABLET ORAL at 07:01

## 2021-01-01 RX ADMIN — GABAPENTIN 100 MG: 100 CAPSULE ORAL at 08:01

## 2021-01-01 RX ADMIN — ENOXAPARIN SODIUM 30 MG: 30 INJECTION SUBCUTANEOUS at 08:01

## 2021-01-01 RX ADMIN — GABAPENTIN 100 MG: 100 CAPSULE ORAL at 08:02

## 2021-01-01 RX ADMIN — LORAZEPAM 1 MG: 2 INJECTION INTRAMUSCULAR; INTRAVENOUS at 07:02

## 2021-01-01 RX ADMIN — REMDESIVIR 100 MG: 100 INJECTION, POWDER, LYOPHILIZED, FOR SOLUTION INTRAVENOUS at 04:02

## 2021-01-01 RX ADMIN — FUROSEMIDE 80 MG: 10 INJECTION, SOLUTION INTRAMUSCULAR; INTRAVENOUS at 08:01

## 2021-01-01 RX ADMIN — METOLAZONE 5 MG: 5 TABLET ORAL at 09:02

## 2021-01-01 RX ADMIN — FUROSEMIDE 10 MG/HR: 10 INJECTION, SOLUTION INTRAVENOUS at 01:01

## 2021-01-01 RX ADMIN — ATORVASTATIN CALCIUM 20 MG: 20 TABLET, FILM COATED ORAL at 08:02

## 2021-01-01 RX ADMIN — OXYBUTYNIN CHLORIDE 5 MG: 5 TABLET, EXTENDED RELEASE ORAL at 10:02

## 2021-01-01 RX ADMIN — POTASSIUM CHLORIDE 40 MEQ: 7.46 INJECTION, SOLUTION INTRAVENOUS at 06:01

## 2021-01-01 RX ADMIN — MELATONIN 6 MG: at 11:01

## 2021-01-01 RX ADMIN — MUPIROCIN: 20 OINTMENT TOPICAL at 10:02

## 2021-01-01 RX ADMIN — AMLODIPINE BESYLATE 5 MG: 5 TABLET ORAL at 10:02

## 2021-01-01 RX ADMIN — CEPHALEXIN 250 MG: 250 CAPSULE ORAL at 08:01

## 2021-01-01 RX ADMIN — LEVOFLOXACIN 500 MG: 5 INJECTION, SOLUTION INTRAVENOUS at 12:01

## 2021-01-01 RX ADMIN — ACETAMINOPHEN 1000 MG: 10 INJECTION, SOLUTION INTRAVENOUS at 05:02

## 2021-01-01 RX ADMIN — POTASSIUM BICARBONATE 35 MEQ: 391 TABLET, EFFERVESCENT ORAL at 08:02

## 2021-01-01 RX ADMIN — ENOXAPARIN SODIUM 30 MG: 30 INJECTION SUBCUTANEOUS at 09:01

## 2021-01-01 RX ADMIN — POTASSIUM CHLORIDE 40 MEQ: 1500 TABLET, EXTENDED RELEASE ORAL at 06:02

## 2021-01-01 RX ADMIN — CEFAZOLIN SODIUM 1 G: 1 SOLUTION INTRAVENOUS at 06:01

## 2021-01-01 RX ADMIN — DEXAMETHASONE 6 MG: 2 TABLET ORAL at 10:02

## 2021-01-01 RX ADMIN — ASPIRIN 81 MG: 81 TABLET, COATED ORAL at 09:02

## 2021-01-13 PROBLEM — N18.30 ACUTE RENAL FAILURE SUPERIMPOSED ON STAGE 3 CHRONIC KIDNEY DISEASE: Status: ACTIVE | Noted: 2021-01-01

## 2021-01-13 PROBLEM — I87.8 VENOUS STASIS: Status: ACTIVE | Noted: 2021-01-01

## 2021-01-13 PROBLEM — R53.81 DEBILITY: Status: ACTIVE | Noted: 2021-01-01

## 2021-01-13 PROBLEM — J90 PLEURAL EFFUSION: Status: ACTIVE | Noted: 2021-01-01

## 2021-01-13 PROBLEM — I50.33 ACUTE ON CHRONIC DIASTOLIC (CONGESTIVE) HEART FAILURE: Status: ACTIVE | Noted: 2021-01-01

## 2021-01-13 PROBLEM — N17.9 ACUTE RENAL FAILURE SUPERIMPOSED ON STAGE 3 CHRONIC KIDNEY DISEASE: Status: ACTIVE | Noted: 2021-01-01

## 2021-01-14 PROBLEM — T14.8XXA HEMATOMA: Status: ACTIVE | Noted: 2021-01-01

## 2021-01-15 PROBLEM — I50.9 ACUTE HEART FAILURE: Status: ACTIVE | Noted: 2021-01-01

## 2021-01-18 PROBLEM — R50.9 FEVER: Status: ACTIVE | Noted: 2021-01-01

## 2021-01-19 PROBLEM — R78.81 BACTEREMIA: Status: ACTIVE | Noted: 2021-01-01

## 2021-01-20 PROBLEM — R53.1 WEAKNESS: Status: ACTIVE | Noted: 2021-01-01

## 2021-01-26 PROBLEM — N17.9 ACUTE RENAL FAILURE SUPERIMPOSED ON STAGE 3 CHRONIC KIDNEY DISEASE: Status: RESOLVED | Noted: 2021-01-01 | Resolved: 2021-01-01

## 2021-01-26 PROBLEM — N18.30 ACUTE RENAL FAILURE SUPERIMPOSED ON STAGE 3 CHRONIC KIDNEY DISEASE: Status: RESOLVED | Noted: 2021-01-01 | Resolved: 2021-01-01

## 2021-01-26 PROBLEM — R79.89 TROPONIN LEVEL ELEVATED: Status: RESOLVED | Noted: 2019-08-20 | Resolved: 2021-01-01

## 2021-01-26 PROBLEM — R78.81 BACTEREMIA: Status: RESOLVED | Noted: 2021-01-01 | Resolved: 2021-01-01

## 2021-01-28 PROBLEM — Z66 DNR (DO NOT RESUSCITATE): Status: ACTIVE | Noted: 2021-01-01

## 2021-02-06 PROBLEM — I21.4 NSTEMI (NON-ST ELEVATED MYOCARDIAL INFARCTION): Status: ACTIVE | Noted: 2021-01-01

## 2021-02-06 PROBLEM — U07.1 ACUTE HYPOXEMIC RESPIRATORY FAILURE DUE TO COVID-19: Status: ACTIVE | Noted: 2021-01-01

## 2021-02-06 PROBLEM — J96.01 ACUTE HYPOXEMIC RESPIRATORY FAILURE DUE TO COVID-19: Status: ACTIVE | Noted: 2021-01-01

## 2021-02-10 PROBLEM — I50.33 ACUTE ON CHRONIC DIASTOLIC (CONGESTIVE) HEART FAILURE: Status: RESOLVED | Noted: 2021-01-01 | Resolved: 2021-01-01

## 2021-02-20 PROBLEM — A41.9 SEPSIS: Status: ACTIVE | Noted: 2021-01-01

## 2021-02-21 PROBLEM — Z51.5 COMFORT MEASURES ONLY STATUS: Status: ACTIVE | Noted: 2021-01-01

## 2021-02-21 PROBLEM — E87.0 ACUTE HYPERNATREMIA: Status: ACTIVE | Noted: 2021-01-01

## 2021-02-21 PROBLEM — N30.00 ACUTE CYSTITIS WITHOUT HEMATURIA: Status: ACTIVE | Noted: 2021-01-01

## 2021-02-21 PROBLEM — G93.41 ENCEPHALOPATHY, METABOLIC: Status: ACTIVE | Noted: 2021-01-01

## 2021-02-24 ENCOUNTER — TELEPHONE (OUTPATIENT)
Dept: MEDSURG UNIT | Facility: HOSPITAL | Age: 86
End: 2021-02-24

## 2021-02-26 LAB
BACTERIA BLD CULT: NORMAL
BACTERIA BLD CULT: NORMAL

## 2021-05-13 NOTE — HOSPITAL COURSE
She was admitted for observation, bradycardia was noted but she was not symptomatic.  A 24 hr holter monitor was arranged, and she will follow up with Dr. Floyd.    no